# Patient Record
Sex: MALE | Race: WHITE | NOT HISPANIC OR LATINO | Employment: FULL TIME | URBAN - METROPOLITAN AREA
[De-identification: names, ages, dates, MRNs, and addresses within clinical notes are randomized per-mention and may not be internally consistent; named-entity substitution may affect disease eponyms.]

---

## 2021-05-11 ENCOUNTER — NON-PROVIDER VISIT (OUTPATIENT)
Dept: WOUND CARE | Facility: MEDICAL CENTER | Age: 56
End: 2021-05-11
Attending: STUDENT IN AN ORGANIZED HEALTH CARE EDUCATION/TRAINING PROGRAM
Payer: COMMERCIAL

## 2021-05-11 DIAGNOSIS — S31.109D OPEN WOUND OF ABDOMEN, SUBSEQUENT ENCOUNTER: ICD-10-CM

## 2021-05-11 PROCEDURE — 99211 OFF/OP EST MAY X REQ PHY/QHP: CPT

## 2021-05-11 PROCEDURE — 97602 WOUND(S) CARE NON-SELECTIVE: CPT

## 2021-05-11 RX ORDER — NAPROXEN SODIUM 220 MG
440 TABLET ORAL EVERY MORNING
Status: ON HOLD | COMMUNITY
End: 2021-06-30

## 2021-05-11 RX ORDER — BENZONATATE 100 MG/1
CAPSULE ORAL
COMMUNITY
Start: 2021-03-23 | End: 2021-06-27

## 2021-05-11 RX ORDER — DOCUSATE SODIUM 100 MG/1
100 CAPSULE, LIQUID FILLED ORAL DAILY
COMMUNITY
Start: 2021-04-21

## 2021-05-11 RX ORDER — MULTIVITAMIN WITH FOLIC ACID 400 MCG
1 TABLET ORAL
COMMUNITY
End: 2021-06-27

## 2021-05-11 RX ORDER — LISINOPRIL AND HYDROCHLOROTHIAZIDE 20; 12.5 MG/1; MG/1
1 TABLET ORAL EVERY MORNING
COMMUNITY
Start: 2021-04-21

## 2021-05-11 RX ORDER — COLCHICINE 0.6 MG/1
TABLET ORAL
COMMUNITY
Start: 2021-05-04 | End: 2021-06-27

## 2021-05-11 RX ORDER — PHENTERMINE HYDROCHLORIDE 37.5 MG/1
37.5 CAPSULE ORAL
COMMUNITY
End: 2021-06-27

## 2021-05-11 RX ORDER — PHENTERMINE HYDROCHLORIDE 37.5 MG/1
TABLET ORAL
COMMUNITY
Start: 2021-02-11 | End: 2021-06-27

## 2021-05-11 NOTE — PATIENT INSTRUCTIONS
-Keep your wound dressing clean, dry, and intact.    -Change your dressing if it becomes soiled, soaked, or falls off.    -Should you experience any significant changes in your wound(s), such as infection (redness, swelling, localized heat, increased pain, fever > 101 F, chills) or have any questions regarding your home care instructions, please contact the wound center at [phone]. If after hours, contact your primary care physician or go to the hospital emergency room.

## 2021-05-11 NOTE — CERTIFICATION
Non Provider Encounter- Full Thickness wound    HISTORY OF PRESENT ILLNESS  Wound History:    START OF CARE IN CLINIC: 05/11/2021    REFERRING PROVIDER: Claudia MARTINEZ   WOUND- Full Thickness Wound   LOCATION: Midline abdomen   HISTORY: Patient is a 55 year old male with primary history of HTN, obesity, and an abdominal wound that has been present for approximately a year. He has a very large ventral hernia that he states has been repaired five times, but has not resolved. Referring physician is also referring him to a surgeon for consult of the hernia again as he states it has gotten larger recently. He wears two abdominal binders daily and has been changing his dressing daily also. He has been caring for the wound himself due to no insurance. He just recently obtained insurance and is now ready to seek assistance with healing this wound.    **He has had reactions to the adhesive dressings in the past - pulling off his skin and causing rashes - we are trying the silicone adhesive foam today. If he tolerates well, please order them from [facility]. Patient agreeable.      Pertinent Labs and Diagnostics:    Labs:     A1c:   Lab Results   Component Value Date/Time    HBA1C 5.8 05/29/2012 12:15 AM     IMAGING: None in EPIC    VASCULAR STUDIES: None in [facility]    LAST  WOUND CULTURE:  DATE : None in [facility]           FALL RISK ASSESSMENT: Patient is a low fall risk.    65 years or older     Fall within the last 2 years   Uses ambulatory devices  Loss of protective sensation in feet   Use of prostethic/orthotic    Presence of lower extremity/foot/toe amputation   Taking medication that increases risk (per facility policy)    Patient allergies and medications reviewed via Epic.     Wound Assessment:  Wound 05/11/21 Abdomen Midline (Active)   Wound Image   05/11/21 0730   Site Assessment Red;Pink;Fragile 05/11/21 0730   Periwound Assessment Other (Comment) 05/11/21 0730   Margins Attached edges 05/11/21 0730   Drainage Amount  Moderate 05/11/21 0730   Drainage Description Serosanguineous 05/11/21 0730   Treatments Cleansed;Site care 05/11/21 0730   Wound Cleansing Puracyn [place] 05/11/21 0730   Periwound Protectant Skin Protectant Wipes to Periwound;Barrier Paste 05/11/21 0730   Dressing Cleansing/Solutions Not Applicable 05/11/21 0730   Dressing Options Hydrofiber Silver;Silicone Adhesive Foam 05/11/21 0730   Dressing Changed New 05/11/21 0730   Dressing Status Clean;Dry;Intact 05/11/21 0730   Dressing Change/Treatment Frequency Every 72 hrs, and As Needed 05/11/21 0730   Non-staged Wound Description Full thickness 05/11/21 0730   Wound Length (cm) 3.8 cm 05/11/21 0730   Wound Width (cm) 5.8 cm 05/11/21 0730   Wound Depth (cm) 0.1 cm 05/11/21 0730   Wound Surface Area (cm^2) 22.04 cm^2 05/11/21 0730   Wound Volume (cm^3) 2.2 cm^3 05/11/21 0730   Post-Procedure Length (cm) 3.8 cm 05/11/21 0730   Post-Procedure Width (cm) 5.8 cm 05/11/21 0730   Post-Procedure Depth (cm) 0.1 cm 05/11/21 0730   Post-Procedure Surface Area (cm^2) 22.04 cm^2 05/11/21 0730   Post-Procedure Volume (cm^3) 2.2 cm^3 05/11/21 0730   Tunneling (cm) 0 cm 05/11/21 0730   Undermining (cm) 0 cm 05/11/21 0730   Wound Odor None 05/11/21 0730   Pulses N/A 05/11/21 0730   Exposed Structures None 05/11/21 0730     Procedures:    -Non selective blunt debridement with puracyn spray and gauze to remove non viable tissue from wound bed. Patient tolerated well.   -Refer to flowsheet for wound care details.     Post-debridement Photo      PATIENT EDUCATION  -Advised to go to ER for any increased redness, swelling, drainage or odor, or if patient develops fever, chills, nausea or vomiting.  -Importance of adequate nutrition for wound healing  -Increase protein intake (unless contraindicated by renal status)

## 2021-05-18 ENCOUNTER — OFFICE VISIT (OUTPATIENT)
Dept: WOUND CARE | Facility: MEDICAL CENTER | Age: 56
End: 2021-05-18
Attending: STUDENT IN AN ORGANIZED HEALTH CARE EDUCATION/TRAINING PROGRAM
Payer: COMMERCIAL

## 2021-05-18 ENCOUNTER — HOSPITAL ENCOUNTER (OUTPATIENT)
Facility: MEDICAL CENTER | Age: 56
End: 2021-05-18
Attending: NURSE PRACTITIONER
Payer: COMMERCIAL

## 2021-05-18 VITALS
RESPIRATION RATE: 18 BRPM | TEMPERATURE: 97.1 F | HEART RATE: 79 BPM | OXYGEN SATURATION: 97 % | SYSTOLIC BLOOD PRESSURE: 151 MMHG | DIASTOLIC BLOOD PRESSURE: 91 MMHG

## 2021-05-18 DIAGNOSIS — T14.8XXA WOUND INFECTION: ICD-10-CM

## 2021-05-18 DIAGNOSIS — Z87.19 HISTORY OF HERNIA REPAIR: ICD-10-CM

## 2021-05-18 DIAGNOSIS — S31.109D OPEN WOUND OF ABDOMEN, SUBSEQUENT ENCOUNTER: Primary | ICD-10-CM

## 2021-05-18 DIAGNOSIS — L08.9 WOUND INFECTION: ICD-10-CM

## 2021-05-18 DIAGNOSIS — E66.01 CLASS 2 SEVERE OBESITY DUE TO EXCESS CALORIES WITH SERIOUS COMORBIDITY IN ADULT, UNSPECIFIED BMI (HCC): ICD-10-CM

## 2021-05-18 DIAGNOSIS — Z98.890 HISTORY OF HERNIA REPAIR: ICD-10-CM

## 2021-05-18 LAB
AMBIGUOUS DTTM AMBI4: NORMAL
GRAM STN SPEC: NORMAL
SIGNIFICANT IND 70042: NORMAL
SIGNIFICANT IND 70042: NORMAL
SITE SITE: NORMAL
SITE SITE: NORMAL
SOURCE SOURCE: NORMAL
SOURCE SOURCE: NORMAL

## 2021-05-18 PROCEDURE — 11042 DBRDMT SUBQ TIS 1ST 20SQCM/<: CPT

## 2021-05-18 PROCEDURE — 11042 DBRDMT SUBQ TIS 1ST 20SQCM/<: CPT | Performed by: NURSE PRACTITIONER

## 2021-05-18 PROCEDURE — 99213 OFFICE O/P EST LOW 20 MIN: CPT | Mod: 25 | Performed by: NURSE PRACTITIONER

## 2021-05-18 PROCEDURE — 11045 DBRDMT SUBQ TISS EACH ADDL: CPT | Performed by: NURSE PRACTITIONER

## 2021-05-18 PROCEDURE — 87186 SC STD MICRODIL/AGAR DIL: CPT

## 2021-05-18 PROCEDURE — 11045 DBRDMT SUBQ TISS EACH ADDL: CPT

## 2021-05-18 PROCEDURE — 99213 OFFICE O/P EST LOW 20 MIN: CPT

## 2021-05-18 PROCEDURE — 87205 SMEAR GRAM STAIN: CPT

## 2021-05-18 PROCEDURE — 87070 CULTURE OTHR SPECIMN AEROBIC: CPT

## 2021-05-18 RX ORDER — OMEPRAZOLE 20 MG/1
20 CAPSULE, DELAYED RELEASE ORAL DAILY
Status: ON HOLD | COMMUNITY
Start: 2021-05-12 | End: 2022-01-18

## 2021-05-18 ASSESSMENT — PAIN SCALES - GENERAL: PAINLEVEL: 3=SLIGHT PAIN

## 2021-05-18 ASSESSMENT — ENCOUNTER SYMPTOMS
SHORTNESS OF BREATH: 0
VOMITING: 0
WEIGHT LOSS: 1
CHILLS: 0
DIZZINESS: 0
DIARRHEA: 0
BACK PAIN: 0
PALPITATIONS: 0
COUGH: 0
FEVER: 0
CONSTIPATION: 0
NAUSEA: 0
NERVOUS/ANXIOUS: 1
DEPRESSION: 0

## 2021-05-18 NOTE — PROGRESS NOTES
Provider Encounter- Full Thickness wound    HISTORY OF PRESENT ILLNESS  Wound History:    START OF CARE IN CLINIC: 5/18/2021    REFERRING PROVIDER: Claudia Sepulveda P.A.-C.     WOUND- Full Thickness Wound   LOCATION: Mid abdomen   HISTORY: Patient with history of multiple hernia repairs with postoperative complications.  Most recent surgery was around 2015 or 2016, after which he healed.  Unfortunately, he developed a small wound over the surgical site in the spring of 2020, which has progressively enlarged.  He was referred to another surgeon, and was told another surgery was not an option. He was caring for the wound himself due to lack of insurance. He is applying antibiotic ointment and nonstick pads. He also uses 2 abdominal binders. Once he obtained insurance coverage again, he requested assistance with this wound. He was referred to [facility] by his primary care provider.    Of note-He has had reactions to the adhesive dressings in the past - pulling off his skin and causing rashes.    Pertinent Medical History: Obesity, multiple hernia repairs, polycythemia    TOBACCO USE: He has never smoked or use smokeless tobacco    Patient's problem list, allergies, and current medications reviewed and updated in Epic    Interval History:  5/18/2021 Initial clinic visit with HEBER Merrill, FNPIERCE-BC, HANNAN, CFCECILIO. Billy states he is feeling well overall, denies fevers, chills, nausea, vomiting, cough or shortness of breath. He expresses some concern about not doing dressing changes 1-2 times per day as he was previously. Advance wound products and appropriate use as discussed, as well as the rationale to less frequent dressing changes. He informed me that his wounds seem to be doing better when he was on antibiotics for something else. Culture collected in clinic today.      REVIEW OF SYSTEMS:   Review of Systems   Constitutional: Positive for weight loss. Negative for chills and fever.        More than 30 pound weight loss  over the past 6 months, intentional   Respiratory: Negative for cough and shortness of breath.    Cardiovascular: Negative for chest pain, palpitations and leg swelling.   Gastrointestinal: Negative for constipation, diarrhea, nausea and vomiting.   Genitourinary: Negative for dysuria.   Musculoskeletal: Negative for back pain and joint pain.   Skin: Negative for rash.   Neurological: Negative for dizziness.   Psychiatric/Behavioral: Negative for depression. The patient is nervous/anxious.         States he is a bit anxious about his wound and recurrent hernias       PHYSICAL EXAMINATION:   /91   Pulse 79   Temp 36.2 °C (97.1 °F)   Resp 18   SpO2 97%     Physical Exam  Constitutional:       Appearance: Normal appearance.   HENT:      Head: Normocephalic.      Comments: Hard of hearing  Eyes:      Pupils: Pupils are equal, round, and reactive to light.   Cardiovascular:      Rate and Rhythm: Normal rate.   Pulmonary:      Effort: Pulmonary effort is normal.   Abdominal:      Palpations: Abdomen is soft.      Tenderness: There is no guarding or rebound.      Comments: Prominent ventral hernia   Musculoskeletal:         General: Normal range of motion.   Skin:     General: Skin is warm.      Comments: Full-thickness wound to lower abdomen, over ventral hernia   Neurological:      Mental Status: He is alert and oriented to person, place, and time.   Psychiatric:         Mood and Affect: Mood normal.         Behavior: Behavior normal.         Thought Content: Thought content normal.         Judgment: Judgment normal.         WOUND ASSESSMENT      Wound 05/11/21 Abdomen Midline (Active)   Wound Image    05/18/21 0815   Site Assessment Red;Pink;Fragile 05/18/21 0815   Periwound Assessment Other (Comment) 05/18/21 0815   Margins Attached edges 05/18/21 0815   Drainage Amount Large 05/18/21 0815   Drainage Description Serosanguineous 05/18/21 0815   Treatments Cleansed;Site care;Provider debridement;Topical  Lidocaine 05/18/21 0815   Wound Cleansing Normal Saline Irrigation 05/18/21 0815   Periwound Protectant Skin Protectant Wipes to Periwound;Barrier Paste 05/18/21 0815   Dressing Cleansing/Solutions Not Applicable 05/18/21 0815   Dressing Options Hydrofiber Silver;Hydrofiber;Silicone Adhesive Foam 05/18/21 0815   Dressing Changed Changed 05/18/21 0815   Dressing Status Clean;Dry;Intact 05/18/21 0815   Dressing Change/Treatment Frequency Every 72 hrs, and As Needed 05/18/21 0815   Non-staged Wound Description Full thickness 05/18/21 0815   Wound Length (cm) 3.8 cm 05/18/21 0815   Wound Width (cm) 5.7 cm 05/18/21 0815   Wound Depth (cm) 0.1 cm 05/18/21 0815   Wound Surface Area (cm^2) 21.66 cm^2 05/18/21 0815   Wound Volume (cm^3) 2.17 cm^3 05/18/21 0815   Post-Procedure Length (cm) 3.9 cm 05/18/21 0815   Post-Procedure Width (cm) 5.8 cm 05/18/21 0815   Post-Procedure Depth (cm) 0.1 cm 05/18/21 0815   Post-Procedure Surface Area (cm^2) 22.62 cm^2 05/18/21 0815   Post-Procedure Volume (cm^3) 2.26 cm^3 05/18/21 0815   Wound Healing % 1 05/18/21 0815   Tunneling (cm) 0 cm 05/18/21 0815   Undermining (cm) 0 cm 05/18/21 0815   Wound Odor None 05/18/21 0815   Pulses N/A 05/11/21 0730   Exposed Structures None 05/18/21 0815        PROCEDURE:   -2% viscous lidocaine applied topically to wound bed for approximately 5 minutes prior to debridement  -Curette used to debride wound bed.  Excisional debridement was performed to remove devitalized tissue until healthy, bleeding tissue was visualized.   Entire surface of wound, 22.6 cm2 debrided.  Tissue debrided into the subcutaneous layer.    -Bleeding controlled with manual pressure.    -Wound cleansed with normal saline  -Culture collected by RN  -Wound care completed by wound RN, refer to flowsheet  -Patient tolerated the procedure well, without c/o pain or discomfort.       Pertinent Labs and Diagnostics:    Labs:     A1c:   Lab Results   Component Value Date/Time    HBA1C 5.8  05/29/2012 12:15 AM          IMAGING: None found in epic    VASCULAR STUDIES: N/A    LAST  WOUND CULTURE:  DATE : 5/18/2021-culture collected in clinic      ASSESSMENT AND PLAN:   1. Open wound of abdomen, subsequent encounter  Comments: Spontaneous opening of wound over hernia, started as small wound which progressively enlarged.    5/18/2021: Initial provider visit.  Large wound which appears to be a shallow full-thickness wound.  Complicated by location over hernia.  Patient wears 2 abdominal binders, I question whether or not this wound is caused by rubbing from the binder.  -Excisional debridement of wound in clinic today, medically necessary to promote wound healing.  -Patient to return to clinic weekly for assessment and debridement  -Patient to change dressing 1-2 times per week in between clinic visits  -Mepilex foam dressing used to protect wound from binders  -If wound does not progress, consider asking patient to stop wearing binders, and/or to consider alternative garment    Wound care: Silver Hydrofiber to manage exudate and bioburden, sacral Mepilex cover dressing    2. Wound infection    5/18/2021: Patient states that his wound appeared to get better when he was on antibiotics for unrelated reason.  -Culture collected in clinic today.  Follow results    3. History of hernia repair  Comments: Per patient, 5 surgical hernia repairs, all of which have failed.  He is apparently not currently a candidate for another surgery.    4. Class 2 severe obesity due to excess calories with serious comorbidity in adult, unspecified BMI (HCC)    5/18/2021: Patient states he has lost over 30 pounds in the last 6 months, intentional.  He plans to continue his efforts to lose more weight.              PATIENT EDUCATION  - Importance of adequate nutrition for wound healing  -Advised to go to ER for any increased redness, swelling, drainage, or odor, or if patient develops fever, chills, nausea or vomiting.     20 min  spent face to face with patient, >50% of time spent counseling, coordinating care, reviewing records, discussing POC, educating patient regarding wound healing and progression.  This time was spent in excess to procedure time.       Please note that this note may have been created using voice recognition software. I have worked with technical experts from [facility] to optimize the interface.  I have made every reasonable attempt to correct obvious errors, but there may be errors of grammar and possibly content that I did not discover before finalizing the note.    N

## 2021-05-18 NOTE — PATIENT INSTRUCTIONS
-Keep your wound dressing clean, dry, and intact.    -Change your dressing if it becomes soiled, soaked, or falls off.    -Should you experience any significant changes in your wound(s), such as infection (redness, swelling, localized heat, increased pain, fever > 101 F, chills) or have any questions regarding your home care instructions, please contact the wound center at [phone]. If after hours, contact your primary care physician or go to the hospital emergency room.

## 2021-05-20 LAB
BACTERIA WND AEROBE CULT: ABNORMAL
BACTERIA WND AEROBE CULT: ABNORMAL
GRAM STN SPEC: ABNORMAL
SIGNIFICANT IND 70042: ABNORMAL
SITE SITE: ABNORMAL
SOURCE SOURCE: ABNORMAL

## 2021-05-26 ENCOUNTER — OFFICE VISIT (OUTPATIENT)
Dept: WOUND CARE | Facility: MEDICAL CENTER | Age: 56
End: 2021-05-26
Attending: STUDENT IN AN ORGANIZED HEALTH CARE EDUCATION/TRAINING PROGRAM
Payer: COMMERCIAL

## 2021-05-26 VITALS
RESPIRATION RATE: 18 BRPM | DIASTOLIC BLOOD PRESSURE: 96 MMHG | TEMPERATURE: 98 F | OXYGEN SATURATION: 96 % | HEART RATE: 79 BPM | SYSTOLIC BLOOD PRESSURE: 143 MMHG

## 2021-05-26 DIAGNOSIS — Z98.890 HISTORY OF HERNIA REPAIR: ICD-10-CM

## 2021-05-26 DIAGNOSIS — T14.8XXA WOUND INFECTION: ICD-10-CM

## 2021-05-26 DIAGNOSIS — L08.9 WOUND INFECTION: ICD-10-CM

## 2021-05-26 DIAGNOSIS — S31.109D OPEN WOUND OF ABDOMEN, SUBSEQUENT ENCOUNTER: Primary | ICD-10-CM

## 2021-05-26 DIAGNOSIS — Z87.19 HISTORY OF HERNIA REPAIR: ICD-10-CM

## 2021-05-26 PROCEDURE — 99213 OFFICE O/P EST LOW 20 MIN: CPT

## 2021-05-26 PROCEDURE — 17250 CHEM CAUT OF GRANLTJ TISSUE: CPT

## 2021-05-26 PROCEDURE — 17250 CHEM CAUT OF GRANLTJ TISSUE: CPT | Performed by: NURSE PRACTITIONER

## 2021-05-26 PROCEDURE — 99213 OFFICE O/P EST LOW 20 MIN: CPT | Performed by: NURSE PRACTITIONER

## 2021-05-26 ASSESSMENT — ENCOUNTER SYMPTOMS
CHILLS: 0
WEIGHT LOSS: 1
PALPITATIONS: 0
BACK PAIN: 0
COUGH: 0
SHORTNESS OF BREATH: 0
DIARRHEA: 0
FEVER: 0
NERVOUS/ANXIOUS: 1
NAUSEA: 0
CONSTIPATION: 0
DEPRESSION: 0
VOMITING: 0
DIZZINESS: 0

## 2021-05-26 ASSESSMENT — PAIN SCALES - GENERAL: PAINLEVEL: NO PAIN

## 2021-05-26 NOTE — PROGRESS NOTES
Wound care dressing supplies ordered through [employer] 05/26/2021.  Order for scanned into patient chart via [employer].

## 2021-05-26 NOTE — PATIENT INSTRUCTIONS
-Keep dressings clean and dry. Change dressings if they become over saturated, soiled or fall off.     -Should you experience any significant changes in your wound(s), such as signs of infection (redness, swelling, localized heat, increased pain, fever > 101 F, chills) or have any questions regarding your home care instructions, please contact the wound center at [phone]. If after hours, contact your primary care physician or go to the hospital emergency room.

## 2021-05-26 NOTE — PROGRESS NOTES
Provider Encounter- Full Thickness wound    HISTORY OF PRESENT ILLNESS  Wound History:    START OF CARE IN CLINIC: 5/18/2021    REFERRING PROVIDER: Claudia Sepulveda P.A.-C.     WOUND- Full Thickness Wound   LOCATION: Mid abdomen   HISTORY: Patient with history of multiple hernia repairs with postoperative complications.  Most recent surgery was around 2015 or 2016, after which he healed.  Unfortunately, he developed a small wound over the surgical site in the spring of 2020, which has progressively enlarged.  He was referred to another surgeon, and was told another surgery was not an option. He was caring for the wound himself due to lack of insurance. He is applying antibiotic ointment and nonstick pads. He also uses 2 abdominal binders. Once he obtained insurance coverage again, he requested assistance with this wound. He was referred to [facility] by his primary care provider.    Of note-He has had reactions to the adhesive dressings in the past - pulling off his skin and causing rashes.    Pertinent Medical History: Obesity, multiple hernia repairs, polycythemia    TOBACCO USE: He has never smoked or use smokeless tobacco    Patient's problem list, allergies, and current medications reviewed and updated in Epic    Interval History:  5/18/2021 Initial clinic visit with HEBER Merrill, FNP-BC, CWRONYN, CFCN. Billy states he is feeling well overall, denies fevers, chills, nausea, vomiting, cough or shortness of breath. He expresses some concern about not doing dressing changes 1-2 times per day as he was previously. Advance wound products and appropriate use as discussed, as well as the rationale to less frequent dressing changes. He informed me that his wounds seem to be doing better when he was on antibiotics for something else. Culture collected in clinic today.    5/26/2021: Clinic visit with HEBER Montes. Patient states that they are feeling well today.  Patient denies fever, chills, nausea, vomiting,  lightheadedness, dizziness, shortness of breath and chest pain.  Silver nitrate used to the superior aspect of the wound to take down hypergranulation tissue.  Dressing changed to Hydrofera Blue classic to reduce hyper granulation and to treat right staff.  Mupirocin ointment placed to periwound area to treat slight folliculitis.        REVIEW OF SYSTEMS:   Review of Systems   Constitutional: Positive for weight loss. Negative for chills and fever.        More than 30 pound weight loss over the past 6 months, intentional   Respiratory: Negative for cough and shortness of breath.    Cardiovascular: Negative for chest pain, palpitations and leg swelling.   Gastrointestinal: Negative for constipation, diarrhea, nausea and vomiting.   Genitourinary: Negative for dysuria.   Musculoskeletal: Negative for back pain and joint pain.   Skin: Negative for rash.   Neurological: Negative for dizziness.   Psychiatric/Behavioral: Negative for depression. The patient is nervous/anxious.         States he is a bit anxious about his wound and recurrent hernias       PHYSICAL EXAMINATION:   /96   Pulse 79   Temp 36.7 °C (98 °F)   Resp 18   SpO2 96%     Physical Exam  Constitutional:       Appearance: Normal appearance.   HENT:      Head: Normocephalic.      Comments: Hard of hearing  Eyes:      Pupils: Pupils are equal, round, and reactive to light.   Cardiovascular:      Rate and Rhythm: Normal rate.   Pulmonary:      Effort: Pulmonary effort is normal.   Abdominal:      Palpations: Abdomen is soft.      Tenderness: There is no guarding or rebound.      Comments: Prominent ventral hernia   Musculoskeletal:         General: Normal range of motion.   Skin:     General: Skin is warm.      Comments: Full-thickness wound to lower abdomen, over ventral hernia   Neurological:      Mental Status: He is alert and oriented to person, place, and time.   Psychiatric:         Mood and Affect: Mood normal.         Behavior: Behavior  normal.         Thought Content: Thought content normal.         Judgment: Judgment normal.         WOUND ASSESSMENT           Wound 05/11/21 Abdomen Midline (Active)   Wound Image   05/26/21 0934   Site Assessment Pink;Red;Fragile 05/26/21 0934   Periwound Assessment Fragile 05/26/21 0934   Margins Attached edges 05/26/21 0934   Drainage Amount Large 05/26/21 0934   Drainage Description Serosanguineous 05/26/21 0934   Treatments Cleansed;Silver nitrate 05/26/21 0934   Wound Cleansing Puracyn [place] 05/26/21 0934   Periwound Protectant Skin Protectant Wipes to Periwound;Barrier Paste 05/26/21 0934   Dressing Cleansing/Solutions Normal Saline 05/26/21 0934   Dressing Options Hydrofera Blue Classic;Silicone Adhesive Foam 05/26/21 0934   Dressing Changed Changed 05/18/21 0815   Dressing Status Clean;Dry;Intact 05/18/21 0815   Dressing Change/Treatment Frequency Every 72 hrs, and As Needed 05/18/21 0815   Non-staged Wound Description Full thickness 05/26/21 0934   Wound Length (cm) 4.1 cm 05/26/21 0934   Wound Width (cm) 5.5 cm 05/26/21 0934   Wound Depth (cm) 0.1 cm 05/26/21 0934   Wound Surface Area (cm^2) 22.55 cm^2 05/26/21 0934   Wound Volume (cm^3) 2.26 cm^3 05/26/21 0934   Post-Procedure Length (cm) 3.9 cm 05/18/21 0815   Post-Procedure Width (cm) 5.8 cm 05/18/21 0815   Post-Procedure Depth (cm) 0.1 cm 05/18/21 0815   Post-Procedure Surface Area (cm^2) 22.62 cm^2 05/18/21 0815   Post-Procedure Volume (cm^3) 2.26 cm^3 05/18/21 0815   Wound Healing % -3 05/26/21 0934   Tunneling (cm) 0 cm 05/26/21 0934   Undermining (cm) 0 cm 05/26/21 0934   Wound Odor None 05/26/21 0934   Pulses N/A 05/11/21 0730   Exposed Structures None 05/26/21 0934             PROCEDURE:   Patient assessed in clinic today.  Patient's hypergranulation tissue taken down with silver nitrate to approximately three fourths of the wound bed.  We will apply Hydrofera Blue classic to help decrease hyper granulation tissue.  Furthermore, Hydrofera Blue  has 2 antimicrobial properties to help reduce bacterial load patient has light staph from previous culture.  No signs or symptoms of cellulitis or systemic infection.  Topical treatment at this time should be sufficient.      Pertinent Labs and Diagnostics:    Labs:     A1c:   Lab Results   Component Value Date/Time    HBA1C 5.8 05/29/2012 12:15 AM          IMAGING: None found in epic    VASCULAR STUDIES: N/A    LAST  WOUND CULTURE:  DATE : 5/18/2021-culture collected in clinic      ASSESSMENT AND PLAN:   1. Open wound of abdomen, subsequent encounter  Comments: Spontaneous opening of wound over hernia, started as small wound which progressively enlarged.    5/26/2021: Large wound which appears to be a shallow full-thickness wound.  Complicated by location over hernia.  Patient wears 2 abdominal binders.  More than likely the binders are contributing to the initial wound.  -Patient's wound treated with silver nitrate to reduce hyper granulation tissue  -Patient to return to clinic weekly for assessment and debridement  -Patient to change dressing 1-2 times per week in between clinic visits  -Mepilex foam dressing used to protect wound from binders  -If wound does not progress, consider asking patient to stop wearing binders, and/or to consider alternative garment    Wound care: Zinc barrier paste to periwound area Hydrofera Blue classic, silicone adhesive foam for securement    2. Wound infection    5/26/2021: Patient states that his wound appeared to get better when he was on antibiotics for unrelated reason.  -Culture results return positive for light staph treated with Hydrofera Blue topically, no signs of cellulitis or additional signs warranting systemic treatment.    3. History of hernia repair  Comments: Per patient, 5 surgical hernia repairs, all of which have failed.  He is apparently not currently a candidate for another surgery.    4. Class 2 severe obesity due to excess calories with serious comorbidity  in adult, unspecified BMI (HCC)    5/26/2021: Patient states he has lost over 30 pounds in the last 6 months, intentional.  He plans to continue his efforts to lose more weight.    PATIENT EDUCATION  - Importance of adequate nutrition for wound healing  -Advised to go to ER for any increased redness, swelling, drainage, or odor, or if patient develops fever, chills, nausea or vomiting.       > 20  min spent face to face with patient, >50% of time spent counseling, coordinating care, reviewing records, discussing POC, educating patient.  Time spent in excess of procedural time        Please note that this note may have been created using voice recognition software. I have worked with technical experts from [employer] to optimize the interface.  I have made every reasonable attempt to correct obvious errors, but there may be errors of grammar and possibly content that I did not discover before finalizing the note.    N

## 2021-05-27 ENCOUNTER — TELEPHONE (OUTPATIENT)
Dept: WOUND CARE | Facility: MEDICAL CENTER | Age: 56
End: 2021-05-27

## 2021-06-01 ENCOUNTER — OFFICE VISIT (OUTPATIENT)
Dept: WOUND CARE | Facility: MEDICAL CENTER | Age: 56
End: 2021-06-01
Attending: STUDENT IN AN ORGANIZED HEALTH CARE EDUCATION/TRAINING PROGRAM
Payer: COMMERCIAL

## 2021-06-01 VITALS
TEMPERATURE: 96.9 F | HEART RATE: 75 BPM | OXYGEN SATURATION: 98 % | DIASTOLIC BLOOD PRESSURE: 89 MMHG | RESPIRATION RATE: 20 BRPM | SYSTOLIC BLOOD PRESSURE: 143 MMHG

## 2021-06-01 DIAGNOSIS — Z87.19 HISTORY OF HERNIA REPAIR: ICD-10-CM

## 2021-06-01 DIAGNOSIS — T14.8XXA WOUND INFECTION: ICD-10-CM

## 2021-06-01 DIAGNOSIS — L08.9 WOUND INFECTION: ICD-10-CM

## 2021-06-01 DIAGNOSIS — S31.109D OPEN WOUND OF ABDOMEN, SUBSEQUENT ENCOUNTER: ICD-10-CM

## 2021-06-01 DIAGNOSIS — E66.01 CLASS 2 SEVERE OBESITY DUE TO EXCESS CALORIES WITH SERIOUS COMORBIDITY IN ADULT, UNSPECIFIED BMI (HCC): ICD-10-CM

## 2021-06-01 DIAGNOSIS — Z98.890 HISTORY OF HERNIA REPAIR: ICD-10-CM

## 2021-06-01 PROCEDURE — 11042 DBRDMT SUBQ TIS 1ST 20SQCM/<: CPT

## 2021-06-01 PROCEDURE — 11042 DBRDMT SUBQ TIS 1ST 20SQCM/<: CPT | Performed by: NURSE PRACTITIONER

## 2021-06-01 ASSESSMENT — ENCOUNTER SYMPTOMS
CONSTIPATION: 0
DIARRHEA: 0
WEIGHT LOSS: 1
NAUSEA: 0
CHILLS: 0
SHORTNESS OF BREATH: 0
PALPITATIONS: 0
DIZZINESS: 0
DEPRESSION: 0
NERVOUS/ANXIOUS: 1
COUGH: 0
VOMITING: 0
FEVER: 0
BACK PAIN: 0

## 2021-06-01 ASSESSMENT — PAIN SCALES - GENERAL: PAINLEVEL: 3=SLIGHT PAIN

## 2021-06-01 NOTE — PROGRESS NOTES
Provider Encounter- Full Thickness wound    HISTORY OF PRESENT ILLNESS  Wound History:    START OF CARE IN CLINIC: 5/18/2021    REFERRING PROVIDER: Claudia Sepulveda P.A.-C.     WOUND- Full Thickness Wound   LOCATION: Mid abdomen   HISTORY: Patient with history of multiple hernia repairs with postoperative complications.  Most recent surgery was around 2015 or 2016, after which he healed.  Unfortunately, he developed a small wound over the surgical site in the spring of 2020, which has progressively enlarged.  He was referred to another surgeon, and was told another surgery was not an option. He was caring for the wound himself due to lack of insurance. He is applying antibiotic ointment and nonstick pads. He also uses 2 abdominal binders. Once he obtained insurance coverage again, he requested assistance with this wound. He was referred to [facility] by his primary care provider.    Of note-He has had reactions to the adhesive dressings in the past - pulling off his skin and causing rashes.    Pertinent Medical History: Obesity, multiple hernia repairs, polycythemia    TOBACCO USE: He has never smoked or use smokeless tobacco    Patient's problem list, allergies, and current medications reviewed and updated in Epic    Interval History:  5/18/2021 Initial clinic visit with HEBER Merrill, FNP-BC, CWRONYN, CFCN. Billy states he is feeling well overall, denies fevers, chills, nausea, vomiting, cough or shortness of breath. He expresses some concern about not doing dressing changes 1-2 times per day as he was previously. Advance wound products and appropriate use as discussed, as well as the rationale to less frequent dressing changes. He informed me that his wounds seem to be doing better when he was on antibiotics for something else. Culture collected in clinic today.    5/26/2021: Clinic visit with HEBER Montes. Patient states that they are feeling well today.  Patient denies fever, chills, nausea, vomiting,  lightheadedness, dizziness, shortness of breath and chest pain.  Silver nitrate used to the superior aspect of the wound to take down hypergranulation tissue.  Dressing changed to Hydrofera Blue classic to reduce hyper granulation and to treat right staff.  Mupirocin ointment placed to periwound area to treat slight folliculitis.      6/1/2021 : Clinic visit with Mary Alcazar, APRN, FNP-BC, CWOCN, CFCN.  Billy states he is feeling well.  He is satisfied with progression of his wound.  We discussed alternative hernia belt options, I referred him to [employer] website.      REVIEW OF SYSTEMS:   Review of Systems   Constitutional: Positive for weight loss. Negative for chills and fever.        More than 30 pound weight loss over the past 6 months, intentional   Respiratory: Negative for cough and shortness of breath.    Cardiovascular: Negative for chest pain, palpitations and leg swelling.   Gastrointestinal: Negative for constipation, diarrhea, nausea and vomiting.   Genitourinary: Negative for dysuria.   Musculoskeletal: Negative for back pain and joint pain.   Skin: Negative for rash.   Neurological: Negative for dizziness.   Psychiatric/Behavioral: Negative for depression. The patient is nervous/anxious.         States he is a bit anxious about his wound and recurrent hernias       PHYSICAL EXAMINATION:   /89   Pulse 75   Temp 36.1 °C (96.9 °F)   Resp 20   SpO2 98%     Physical Exam  Constitutional:       Appearance: Normal appearance.   HENT:      Head: Normocephalic.      Comments: Hard of hearing  Eyes:      Pupils: Pupils are equal, round, and reactive to light.   Cardiovascular:      Rate and Rhythm: Normal rate.   Pulmonary:      Effort: Pulmonary effort is normal.   Abdominal:      Palpations: Abdomen is soft.      Tenderness: There is no guarding or rebound.      Comments: Prominent ventral hernia   Musculoskeletal:         General: Normal range of motion.   Skin:     General: Skin is warm.       Comments: Full-thickness wound to lower abdomen, over ventral hernia   Neurological:      Mental Status: He is alert and oriented to person, place, and time.   Psychiatric:         Mood and Affect: Mood normal.         Behavior: Behavior normal.         Thought Content: Thought content normal.         Judgment: Judgment normal.         WOUND ASSESSMENT    Wound 05/11/21 Abdomen Midline (Active)   Wound Image    06/01/21 0800   Site Assessment Pink;Red;Fragile 06/01/21 0800   Periwound Assessment Fragile 06/01/21 0800   Margins Attached edges 06/01/21 0800   Drainage Amount Moderate 06/01/21 0800   Drainage Description Serosanguineous 06/01/21 0800   Treatments Cleansed;Topical Lidocaine;Provider debridement;Site care 06/01/21 0800   Wound Cleansing Normal Saline Irrigation 06/01/21 0800   Periwound Protectant Skin Protectant Wipes to Periwound;Barrier Paste 06/01/21 0800   Dressing Cleansing/Solutions Normal Saline 06/01/21 0800   Dressing Options Hydrofera Blue Classic;Silicone Adhesive Foam 06/01/21 0800   Dressing Changed Changed 06/01/21 0800   Dressing Status Clean;Dry;Intact 05/18/21 0815   Dressing Change/Treatment Frequency Every 72 hrs, and As Needed 06/01/21 0800   Non-staged Wound Description Full thickness 06/01/21 0800   Wound Length (cm) 3.3 cm 06/01/21 0800   Wound Width (cm) 4.5 cm 06/01/21 0800   Wound Depth (cm) 0.1 cm 06/01/21 0800   Wound Surface Area (cm^2) 14.85 cm^2 06/01/21 0800   Wound Volume (cm^3) 1.48 cm^3 06/01/21 0800   Post-Procedure Length (cm) 3.4 cm 06/01/21 0800   Post-Procedure Width (cm) 4.6 cm 06/01/21 0800   Post-Procedure Depth (cm) 0.1 cm 06/01/21 0800   Post-Procedure Surface Area (cm^2) 15.64 cm^2 06/01/21 0800   Post-Procedure Volume (cm^3) 1.56 cm^3 06/01/21 0800   Wound Healing % 33 06/01/21 0800   Tunneling (cm) 0 cm 06/01/21 0800   Undermining (cm) 0 cm 06/01/21 0800   Wound Odor None 06/01/21 0800   Pulses N/A 05/11/21 0730   Exposed Structures None 06/01/21 0800      PROCEDURE:   -2% viscous lidocaine applied topically to wound bed for approximately 5 minutes prior to debridement  -Curette used to debride wound bed.  Excisional debridement was performed to remove devitalized tissue until healthy, bleeding tissue was visualized.   Entire surface of wound, 15.64 cm2 debrided.  Tissue debrided into the subcutaneous layer.    -Bleeding controlled with manual pressure.    -Wound care completed by wound RN, refer to flowsheet  -Patient tolerated the procedure well, without c/o pain or discomfort.     Pertinent Labs and Diagnostics:    Labs:     A1c:   Lab Results   Component Value Date/Time    HBA1C 5.8 05/29/2012 12:15 AM          IMAGING: None found in epic    VASCULAR STUDIES: N/A    LAST  WOUND CULTURE:  DATE : 5/18/2021-culture collected in clinic      ASSESSMENT AND PLAN:   1. Open wound of abdomen, subsequent encounter  Comments: Spontaneous opening of wound over hernia, started as small wound which progressively enlarged.    6/1/2021: Wound area has decreased in size assessment.  Patient referred to site for alternative hernia belt options.  -Patient's wound treated with silver nitrate to reduce hyper granulation tissue  -Patient to return to clinic weekly for assessment and debridement  -Patient to change dressing 1-2 times per week in between clinic visits  -Mepilex foam dressing used to protect wound from binders  -If wound does not progress, consider asking patient to stop wearing binders, and/or to consider alternative garment    Wound care: Zinc barrier paste to periwound area Hydrofera Blue classic, silicone adhesive foam for securement    2. Wound infection    6/1/2021: No signs or symptoms of infection today  -H clinic visit    3. History of hernia repair  Comments: Per patient, 5 surgical hernia repairs, all of which have failed.  He is apparently not currently a candidate for another surgery.    4. Class 2 severe obesity due to excess calories with serious  comorbidity in adult, unspecified BMI (HCC)    6/1/2021: Patient states he has lost over 30 pounds in the last 6 months, intentional.  He plans to continue his efforts to lose more weight.    PATIENT EDUCATION  - Importance of adequate nutrition for wound healing  -Advised to go to ER for any increased redness, swelling, drainage, or odor, or if patient develops fever, chills, nausea or vomiting.             Please note that this note may have been created using voice recognition software. I have worked with technical experts from [employer] to optimize the interface.  I have made every reasonable attempt to correct obvious errors, but there may be errors of grammar and possibly content that I did not discover before finalizing the note.    N

## 2021-06-01 NOTE — PATIENT INSTRUCTIONS
-Keep your wound dressing clean, dry, and intact.    -Change your dressing if it becomes soiled, soaked, or falls off.    -Should you experience any significant changes in your wound(s), such as infection (redness, swelling, localized heat, increased pain, fever > 101 F, chills) or have any questions regarding your home care instructions, please contact the wound center at [phone]. If after hours, contact your primary care physician or go to the hospital emergency room.

## 2021-06-08 ENCOUNTER — NON-PROVIDER VISIT (OUTPATIENT)
Dept: WOUND CARE | Facility: MEDICAL CENTER | Age: 56
End: 2021-06-08
Attending: STUDENT IN AN ORGANIZED HEALTH CARE EDUCATION/TRAINING PROGRAM
Payer: COMMERCIAL

## 2021-06-08 PROCEDURE — 97602 WOUND(S) CARE NON-SELECTIVE: CPT

## 2021-06-08 NOTE — PATIENT INSTRUCTIONS
-Keep your wound dressing clean, dry, and intact.    -Change your dressing if it becomes soiled, soaked, or falls off.    -Should you experience any significant changes in your wound(s), such as infection (redness, swelling, localized heat, increased pain, fever > 101 F, chills) or have any questions regarding your home care instructions, please contact the wound center at [phone]. If after hours, contact your primary care physician or go to the hospital emergency room.

## 2021-06-08 NOTE — PROCEDURES
Non-selective debridement with gauze and saline to debride wound bed and periwound of non-viable tissue. Patient tolerated the procedure well.

## 2021-06-15 ENCOUNTER — OFFICE VISIT (OUTPATIENT)
Dept: WOUND CARE | Facility: MEDICAL CENTER | Age: 56
End: 2021-06-15
Attending: STUDENT IN AN ORGANIZED HEALTH CARE EDUCATION/TRAINING PROGRAM
Payer: COMMERCIAL

## 2021-06-15 VITALS
HEART RATE: 69 BPM | DIASTOLIC BLOOD PRESSURE: 103 MMHG | RESPIRATION RATE: 12 BRPM | TEMPERATURE: 97 F | OXYGEN SATURATION: 97 % | SYSTOLIC BLOOD PRESSURE: 144 MMHG

## 2021-06-15 DIAGNOSIS — T14.8XXA WOUND INFECTION: ICD-10-CM

## 2021-06-15 DIAGNOSIS — Z98.890 HISTORY OF HERNIA REPAIR: ICD-10-CM

## 2021-06-15 DIAGNOSIS — L08.9 WOUND INFECTION: ICD-10-CM

## 2021-06-15 DIAGNOSIS — Z87.19 HISTORY OF HERNIA REPAIR: ICD-10-CM

## 2021-06-15 DIAGNOSIS — S31.109D OPEN WOUND OF ABDOMEN, SUBSEQUENT ENCOUNTER: Primary | ICD-10-CM

## 2021-06-15 DIAGNOSIS — E66.01 CLASS 2 SEVERE OBESITY DUE TO EXCESS CALORIES WITH SERIOUS COMORBIDITY IN ADULT, UNSPECIFIED BMI (HCC): ICD-10-CM

## 2021-06-15 PROCEDURE — 11042 DBRDMT SUBQ TIS 1ST 20SQCM/<: CPT | Performed by: NURSE PRACTITIONER

## 2021-06-15 PROCEDURE — 11042 DBRDMT SUBQ TIS 1ST 20SQCM/<: CPT

## 2021-06-15 ASSESSMENT — ENCOUNTER SYMPTOMS
VOMITING: 0
CONSTIPATION: 0
BACK PAIN: 0
DIZZINESS: 0
CHILLS: 0
DIARRHEA: 0
COUGH: 0
PALPITATIONS: 0
FEVER: 0
NAUSEA: 0
DEPRESSION: 0
WEIGHT LOSS: 1
SHORTNESS OF BREATH: 0

## 2021-06-15 ASSESSMENT — PAIN SCALES - GENERAL: PAINLEVEL: NO PAIN

## 2021-06-15 NOTE — PROGRESS NOTES
Provider Encounter- Full Thickness wound    HISTORY OF PRESENT ILLNESS  Wound History:    START OF CARE IN CLINIC: 5/18/2021    REFERRING PROVIDER: Claudia Sepulveda P.A.-C.     WOUND- Full Thickness Wound   LOCATION: Mid abdomen   HISTORY: Patient with history of multiple hernia repairs with postoperative complications.  Most recent surgery was around 2015 or 2016, after which he healed.  Unfortunately, he developed a small wound over the surgical site in the spring of 2020, which has progressively enlarged.  He was referred to another surgeon, and was told another surgery was not an option. He was caring for the wound himself due to lack of insurance. He is applying antibiotic ointment and nonstick pads. He also uses 2 abdominal binders. Once he obtained insurance coverage again, he requested assistance with this wound. He was referred to [facility] by his primary care provider.    Of note-He has had reactions to the adhesive dressings in the past - pulling off his skin and causing rashes.    Pertinent Medical History: Obesity, multiple hernia repairs, polycythemia    TOBACCO USE: He has never smoked or use smokeless tobacco    Patient's problem list, allergies, and current medications reviewed and updated in Epic    Interval History:  5/18/2021 Initial clinic visit with HEBER Merrill, FNP-BC, CWRONYN, CFCN. Billy states he is feeling well overall, denies fevers, chills, nausea, vomiting, cough or shortness of breath. He expresses some concern about not doing dressing changes 1-2 times per day as he was previously. Advance wound products and appropriate use as discussed, as well as the rationale to less frequent dressing changes. He informed me that his wounds seem to be doing better when he was on antibiotics for something else. Culture collected in clinic today.    5/26/2021: Clinic visit with HEBER Montes. Patient states that they are feeling well today.  Patient denies fever, chills, nausea, vomiting,  lightheadedness, dizziness, shortness of breath and chest pain.  Silver nitrate used to the superior aspect of the wound to take down hypergranulation tissue.  Dressing changed to Hydrofera Blue classic to reduce hyper granulation and to treat right staff.  Mupirocin ointment placed to periwound area to treat slight folliculitis.      6/1/2021 : Clinic visit with HEBER Merrill, FNP-BC, CWOCN, CFCN.  Billy states he is feeling well.  He is satisfied with progression of his wound.  We discussed alternative hernia belt options, I referred him to [employer] website.    6/15/2021: Clinic visit with HEBER Montes. Patient states that they are feeling well today.  Patient denies fever, chills, nausea, vomiting, lightheadedness, dizziness, shortness of breath and chest pain. Epibole edges to the inferior aspect of the wound taken down in clinic today.  Surface of the wound debrided to stimulate granulation tissue formation continue progression the wound bed.    REVIEW OF SYSTEMS:   Review of Systems   Constitutional: Positive for weight loss. Negative for chills and fever.        More than 30 pound weight loss over the past 6 months, intentional   Respiratory: Negative for cough and shortness of breath.    Cardiovascular: Negative for chest pain, palpitations and leg swelling.   Gastrointestinal: Negative for constipation, diarrhea, nausea and vomiting.   Genitourinary: Negative for dysuria.   Musculoskeletal: Negative for back pain and joint pain.   Skin: Negative for rash.   Neurological: Negative for dizziness.   Psychiatric/Behavioral: Negative for depression.       PHYSICAL EXAMINATION:   /103 (BP Location: Right arm, Patient Position: Supine, BP Cuff Size: Adult)   Pulse 69   Temp 36.1 °C (97 °F) (Temporal)   Resp 12   SpO2 97%     Physical Exam  Constitutional:       Appearance: Normal appearance.   HENT:      Head: Normocephalic.      Comments: Hard of hearing  Eyes:      Pupils: Pupils are  equal, round, and reactive to light.   Cardiovascular:      Rate and Rhythm: Normal rate.   Pulmonary:      Effort: Pulmonary effort is normal.   Abdominal:      Palpations: Abdomen is soft.      Tenderness: There is no guarding or rebound.      Comments: Prominent ventral hernia   Musculoskeletal:         General: Normal range of motion.   Skin:     General: Skin is warm.      Comments: Full-thickness wound to lower abdomen, over ventral hernia   Neurological:      Mental Status: He is alert and oriented to person, place, and time.   Psychiatric:         Mood and Affect: Mood normal.         Behavior: Behavior normal.         Thought Content: Thought content normal.         Judgment: Judgment normal.         WOUND ASSESSMENT         Wound 05/11/21 Abdomen Midline (Active)   Wound Image   06/15/21 0835   Site Assessment Red;Fragile;Early/partial granulation 06/15/21 0817   Periwound Assessment Scar tissue 06/15/21 0817   Margins Attached edges;Epibole (rolled edges);Defined edges 06/15/21 0817   Drainage Amount Small 06/15/21 0817   Drainage Description Serosanguineous 06/15/21 0817   Treatments Cleansed;Topical Lidocaine;Provider debridement 06/15/21 0817   Wound Cleansing [facility] [place] [facility] 06/15/21 0817   Periwound Protectant Skin Protectant Wipes to Periwound;Barrier Paste 06/15/21 0817   Dressing Cleansing/Solutions Normal Saline 06/15/21 0817   Dressing Options Hydrofera Blue Classic;Silicone Adhesive Foam 06/15/21 0817   Dressing Changed Changed 06/15/21 0817   Dressing Status Clean;Dry;Intact 05/18/21 0815   Dressing Change/Treatment Frequency Every 72 hrs, and As Needed 06/15/21 0817   Non-staged Wound Description Full thickness 06/15/21 0817   Wound Length (cm) 2.8 cm 06/15/21 0817   Wound Width (cm) 3.4 cm 06/15/21 0817   Wound Depth (cm) 0.1 cm 06/15/21 0817   Wound Surface Area (cm^2) 9.52 cm^2 06/15/21 0817   Wound Volume (cm^3) 0.95 cm^3 06/15/21 0817   Post-Procedure Length (cm) 2.8 cm 06/15/21 0817    Post-Procedure Width (cm) 3.2 cm 06/15/21 0817   Post-Procedure Depth (cm) 0.1 cm 06/15/21 0817   Post-Procedure Surface Area (cm^2) 8.96 cm^2 06/15/21 0817   Post-Procedure Volume (cm^3) 0.9 cm^3 06/15/21 0817   Wound Healing % 57 06/15/21 0817   Wound Bed Granulation (%) 100 % 06/15/21 0817   Tunneling (cm) 0 cm 06/15/21 0817   Undermining (cm) 0 cm 06/15/21 0817   Wound Odor None 06/15/21 0817   Pulses N/A 05/11/21 0730   Exposed Structures None 06/15/21 0817            PROCEDURE:   -2% viscous lidocaine applied topically to wound bed for approximately 5 minutes prior to debridement  -Curette used to debride wound bed.  Excisional debridement was performed to remove devitalized tissue until healthy, bleeding tissue was visualized.   Entire surface of wound, 8.96 cm2 debrided.  Tissue debrided into the subcutaneous layer.   -Bleeding controlled with manual pressure.    -Wound care completed by wound RN, refer to flowsheet  -Patient tolerated the procedure well, without c/o pain or discomfort.     Pertinent Labs and Diagnostics:    Labs:     A1c:   Lab Results   Component Value Date/Time    HBA1C 5.8 05/29/2012 12:15 AM          IMAGING: None found in epic    VASCULAR STUDIES: N/A    LAST  WOUND CULTURE:  DATE : 5/18/2021-culture collected in clinic      ASSESSMENT AND PLAN:   1. Open wound of abdomen, subsequent encounter  Comments: Spontaneous opening of wound over hernia, started as small wound which progressively enlarged.    6/15/2021: Wound area has decreased in size assessment. Epibole edges taken down in clinic.  Patient referred to site for alternative hernia belt options.  -Inferior rolled as taken down with curette in clinic today.  -Patient to return to clinic weekly for assessment and debridement  -Patient to change dressing 1-2 times per week in between clinic visits  -Mepilex foam dressing used to protect wound from binders  -If wound does not progress, consider asking patient to stop wearing  binders, and/or to consider alternative garment    Wound care: Hydrofera Blue classic, silicone adhesive foam    2. Wound infection    6/15/2021: No signs or symptoms of infection today  -Continue to monitor for signs symptoms of infection at each additional clinic visit    3. History of hernia repair  Comments: Per patient, 5 surgical hernia repairs, all of which have failed.  He is apparently not currently a candidate for another surgery.    4. Class 2 severe obesity due to excess calories with serious comorbidity in adult, unspecified BMI (HCC)    6/15/2021: Patient states he has lost over 30 pounds in the last 6 months, intentional.  He plans to continue his efforts to lose more weight.    PATIENT EDUCATION  - Importance of adequate nutrition for wound healing  -Advised to go to ER for any increased redness, swelling, drainage, or odor, or if patient develops fever, chills, nausea or vomiting.             Please note that this note may have been created using voice recognition software. I have worked with technical experts from [employer] to optimize the interface.  I have made every reasonable attempt to correct obvious errors, but there may be errors of grammar and possibly content that I did not discover before finalizing the note.    N

## 2021-06-15 NOTE — PATIENT INSTRUCTIONS
Should you experience any significant changes in your wound(s) such as infection (redness, swelling, localized heat, increased pain, fever >101 F, chills) or have any questions regarding your home care instructions, please contact the wound center [phone]. If after hours, contact your primary care physician or go the hospital emergency room.  Change dressing if over saturated, soiled or its falling off or every 72 hours as demonstrated during your appointment.

## 2021-06-22 ENCOUNTER — OFFICE VISIT (OUTPATIENT)
Dept: WOUND CARE | Facility: MEDICAL CENTER | Age: 56
End: 2021-06-22
Attending: STUDENT IN AN ORGANIZED HEALTH CARE EDUCATION/TRAINING PROGRAM
Payer: COMMERCIAL

## 2021-06-22 VITALS
SYSTOLIC BLOOD PRESSURE: 148 MMHG | DIASTOLIC BLOOD PRESSURE: 96 MMHG | HEART RATE: 96 BPM | RESPIRATION RATE: 18 BRPM | OXYGEN SATURATION: 97 % | TEMPERATURE: 97.6 F

## 2021-06-22 DIAGNOSIS — L08.9 WOUND INFECTION: ICD-10-CM

## 2021-06-22 DIAGNOSIS — T14.8XXA WOUND INFECTION: ICD-10-CM

## 2021-06-22 DIAGNOSIS — Z98.890 HISTORY OF HERNIA REPAIR: ICD-10-CM

## 2021-06-22 DIAGNOSIS — Z87.19 HISTORY OF HERNIA REPAIR: ICD-10-CM

## 2021-06-22 DIAGNOSIS — E66.01 CLASS 2 SEVERE OBESITY DUE TO EXCESS CALORIES WITH SERIOUS COMORBIDITY IN ADULT, UNSPECIFIED BMI (HCC): ICD-10-CM

## 2021-06-22 DIAGNOSIS — S31.109D OPEN WOUND OF ABDOMEN, SUBSEQUENT ENCOUNTER: ICD-10-CM

## 2021-06-22 PROCEDURE — 11042 DBRDMT SUBQ TIS 1ST 20SQCM/<: CPT | Performed by: NURSE PRACTITIONER

## 2021-06-22 PROCEDURE — 99213 OFFICE O/P EST LOW 20 MIN: CPT

## 2021-06-22 PROCEDURE — 11042 DBRDMT SUBQ TIS 1ST 20SQCM/<: CPT

## 2021-06-22 PROCEDURE — 99213 OFFICE O/P EST LOW 20 MIN: CPT | Mod: 25 | Performed by: NURSE PRACTITIONER

## 2021-06-22 ASSESSMENT — ENCOUNTER SYMPTOMS
BACK PAIN: 0
CHILLS: 0
FEVER: 0
NAUSEA: 0
DEPRESSION: 0
DIZZINESS: 0
PALPITATIONS: 0
CONSTIPATION: 0
SHORTNESS OF BREATH: 0
DIARRHEA: 0
VOMITING: 0
COUGH: 0
WEIGHT LOSS: 1

## 2021-06-22 ASSESSMENT — PAIN SCALES - GENERAL: PAINLEVEL: 2=MINIMAL-SLIGHT

## 2021-06-22 NOTE — PROGRESS NOTES
Provider Encounter- Full Thickness wound    HISTORY OF PRESENT ILLNESS  Wound History:    START OF CARE IN CLINIC: 5/18/2021    REFERRING PROVIDER: Claudia Sepulveda P.A.-C.     WOUND- Full Thickness Wound   LOCATION: Mid abdomen   HISTORY: Patient with history of multiple hernia repairs with postoperative complications.  Most recent surgery was around 2015 or 2016, after which he healed.  Unfortunately, he developed a small wound over the surgical site in the spring of 2020, which has progressively enlarged.  He was referred to another surgeon, and was told another surgery was not an option. He was caring for the wound himself due to lack of insurance. He is applying antibiotic ointment and nonstick pads. He also uses 2 abdominal binders. Once he obtained insurance coverage again, he requested assistance with this wound. He was referred to [facility] by his primary care provider.    Of note-He has had reactions to the adhesive dressings in the past - pulling off his skin and causing rashes.    Pertinent Medical History: Obesity, multiple hernia repairs, polycythemia    TOBACCO USE: He has never smoked or use smokeless tobacco    Patient's problem list, allergies, and current medications reviewed and updated in Epic    Interval History:  5/18/2021 Initial clinic visit with HEBER Merrill, FNP-BC, CWRONYN, CFCN. Billy states he is feeling well overall, denies fevers, chills, nausea, vomiting, cough or shortness of breath. He expresses some concern about not doing dressing changes 1-2 times per day as he was previously. Advance wound products and appropriate use as discussed, as well as the rationale to less frequent dressing changes. He informed me that his wounds seem to be doing better when he was on antibiotics for something else. Culture collected in clinic today.    5/26/2021: Clinic visit with HEBER Montes. Patient states that they are feeling well today.  Patient denies fever, chills, nausea, vomiting,  lightheadedness, dizziness, shortness of breath and chest pain.  Silver nitrate used to the superior aspect of the wound to take down hypergranulation tissue.  Dressing changed to Hydrofera Blue classic to reduce hyper granulation and to treat right staff.  Mupirocin ointment placed to periwound area to treat slight folliculitis.      6/1/2021 : Clinic visit with HEBER Merrill, DILCIA-BC, CWOCN, CFCN.  Billy states he is feeling well.  He is satisfied with progression of his wound.  We discussed alternative hernia belt options, [facility].    6/15/2021: Clinic visit with HEBER Montes. Patient states that they are feeling well today.  Patient denies fever, chills, nausea, vomiting, lightheadedness, dizziness, shortness of breath and chest pain. Epibole edges to the inferior aspect of the wound taken down in clinic today.  Surface of the wound debrided to stimulate granulation tissue formation continue progression the wound bed.    6/22/21: Clinic visit with HEBER Leonard, DILCIA-BC. Patient reports feeling well.  Overall he is happy with the wound progress.  He does report to me today that the wound started due to some rubbing on his skin from his hernia belt.  He states initially it was a small area but then he applied a Band-Aid and when he remove the Band-Aid there was subsequent skin tear.  Denies wound drainage, odor. Denies erythema, swelling, pain.  He has been seen by multiple surgeons in the past and has been told hernia surgery is not an option.  He is discussing today that his primary care provider has discussed sending him to a specialist at [facility].  He does wear a hernia belt consistently    REVIEW OF SYSTEMS:   Review of Systems   Constitutional: Positive for weight loss. Negative for chills and fever.        More than 30 pound weight loss over the past 6 months, intentional   Respiratory: Negative for cough and shortness of breath.    Cardiovascular: Negative for  chest pain, palpitations and leg swelling.   Gastrointestinal: Negative for constipation, diarrhea, nausea and vomiting.   Genitourinary: Negative for dysuria.   Musculoskeletal: Negative for back pain and joint pain.   Skin: Negative for rash.   Neurological: Negative for dizziness.   Psychiatric/Behavioral: Negative for depression.       PHYSICAL EXAMINATION:   /96   Pulse 96   Temp 36.4 °C (97.6 °F)   Resp 18   SpO2 97%     Physical Exam  Constitutional:       Appearance: Normal appearance.   HENT:      Head: Normocephalic.      Comments: Hard of hearing  Eyes:      Pupils: Pupils are equal, round, and reactive to light.   Cardiovascular:      Rate and Rhythm: Normal rate.   Pulmonary:      Effort: Pulmonary effort is normal.   Abdominal:      Palpations: Abdomen is soft.      Tenderness: There is no guarding or rebound.      Comments: Prominent ventral hernia   Musculoskeletal:         General: Normal range of motion.   Skin:     General: Skin is warm.      Comments: Full-thickness wound to lower abdomen, over ventral hernia   Neurological:      Mental Status: He is alert and oriented to person, place, and time.   Psychiatric:         Mood and Affect: Mood normal.         Behavior: Behavior normal.         Thought Content: Thought content normal.         Judgment: Judgment normal.         WOUND ASSESSMENT      Wound 05/11/21 Abdomen Midline (Active)   Wound Image    06/22/21 1400   Site Assessment Red;Early/partial granulation 06/22/21 1400   Periwound Assessment Scar tissue 06/22/21 1400   Margins Attached edges;Epibole (rolled edges) 06/22/21 1400   Drainage Amount Small 06/22/21 1400   Drainage Description Serosanguineous 06/22/21 1400   Treatments Cleansed;Topical Lidocaine;Provider debridement;Site care 06/22/21 1400   Wound Cleansing Puracyn Hollenberg 06/22/21 1400   Periwound Protectant Skin Protectant Wipes to Periwound;Barrier Paste 06/22/21 1400   Dressing Cleansing/Solutions Normal Saline  06/15/21 0817   Dressing Options Hydrofera Blue Classic;Silicone Adhesive Foam 06/22/21 1400   Dressing Changed Changed 06/15/21 0817   Dressing Status Clean;Dry;Intact 05/18/21 0815   Dressing Change/Treatment Frequency Every 72 hrs, and As Needed 06/15/21 0817   Non-staged Wound Description Full thickness 06/22/21 1400   Wound Length (cm) 1.7 cm 06/22/21 1400   Wound Width (cm) 3 cm 06/22/21 1400   Wound Depth (cm) 0.1 cm 06/22/21 1400   Wound Surface Area (cm^2) 5.1 cm^2 06/22/21 1400   Wound Volume (cm^3) 0.51 cm^3 06/22/21 1400   Post-Procedure Length (cm) 1.9 cm 06/22/21 1400   Post-Procedure Width (cm) 3.1 cm 06/22/21 1400   Post-Procedure Depth (cm) 0.1 cm 06/22/21 1400   Post-Procedure Surface Area (cm^2) 5.89 cm^2 06/22/21 1400   Post-Procedure Volume (cm^3) 0.59 cm^3 06/22/21 1400   Wound Healing % 77 06/22/21 1400   Wound Bed Granulation (%) 100 % 06/22/21 1400   Tunneling (cm) 0 cm 06/22/21 1400   Undermining (cm) 0 cm 06/22/21 1400   Wound Odor None 06/22/21 1400   Pulses N/A 05/11/21 0730   Exposed Structures None 06/22/21 1400        PROCEDURE:   -2% viscous lidocaine applied topically to wound bed for approximately 5 minutes prior to debridement  -Curette used to debride wound bed.  Excisional debridement was performed to remove devitalized tissue until healthy, bleeding tissue was visualized.   Entire surface of wound, 5.89 cm2 debrided.  Tissue debrided into the subcutaneous layer.   -Bleeding controlled with manual pressure.    -Wound care completed by wound RN, refer to flowsheet  -Patient tolerated the procedure well, without c/o pain or discomfort.     Pertinent Labs and Diagnostics:    Labs:     A1c:   Lab Results   Component Value Date/Time    HBA1C 5.8 05/29/2012 12:15 AM          IMAGING: None found in epic    VASCULAR STUDIES: N/A    LAST  WOUND CULTURE:  DATE : 5/18/2021-culture collected in clinic      ASSESSMENT AND PLAN:   1. Open wound of abdomen, subsequent encounter  Comments:  Spontaneous opening of wound over hernia, started as small wound which progressively enlarged.    6/22/2021: Wound area has decreased in size assessment.     -Patient to return to clinic weekly for assessment and debridement  -Patient to change dressing 1-2 times per week in between clinic visits  -Mepilex foam dressing used to protect wound from binders    Wound care: Hydrofera Blue classic, silicone adhesive foam    2. Wound infection    6/22/2021: No signs or symptoms of infection today  -Continue to monitor for signs symptoms of infection at each additional clinic visit    3. History of hernia repair  Comments: Per patient, 5 surgical hernia repairs, all of which have failed.  He is apparently not currently a candidate for another surgery, however does mention that his primary care provider has discussed referral to [facility] to a surgical specialist    4. Class 2 severe obesity due to excess calories with serious comorbidity in adult, unspecified BMI (HCC)    6/22/2021: Patient states he has an intentional weight loss of 30 pounds in the last 6 months.  He plans to continue his efforts to lose more weight.  He is aware of how his weight contributes to his abdominal hernia    PATIENT EDUCATION  - Importance of adequate nutrition for wound healing  -Advised to go to ER for any increased redness, swelling, drainage, or odor, or if patient develops fever, chills, nausea or vomiting.       My total time spent caring for the patient on the day of the encounter was 25 minutes.   This does not include time spent on separately billable procedures/tests.        Please note that this note may have been created using voice recognition software. I have worked with technical experts from [employer] to optimize the interface.  I have made every reasonable attempt to correct obvious errors, but there may be errors of grammar and possibly content that I did not discover before finalizing the note.    N

## 2021-06-22 NOTE — PATIENT INSTRUCTIONS
Apply Hydrofera Blue Classic moistened with normal saline to wound bed as directed by provider. Cover wound with silicone bordered foam and change dressing every 2 days.    Should you experience any significant changes in your wound(s), such as infection (redness, swelling, localized heat, increased pain, fever > 101 F, chills) or have any questions regarding your home care instructions, please contact the wound center at [phone]. If after hours, contact your primary care physician or go to the hospital emergency room.   Keep dressing clean, dry and covered while bathing. Only change dressing if it becomes over saturated, soiled or falls off.

## 2021-06-27 ENCOUNTER — APPOINTMENT (OUTPATIENT)
Dept: RADIOLOGY | Facility: MEDICAL CENTER | Age: 56
DRG: 872 | End: 2021-06-27
Attending: EMERGENCY MEDICINE
Payer: COMMERCIAL

## 2021-06-27 ENCOUNTER — HOSPITAL ENCOUNTER (INPATIENT)
Facility: MEDICAL CENTER | Age: 56
LOS: 3 days | DRG: 872 | End: 2021-06-30
Attending: EMERGENCY MEDICINE | Admitting: STUDENT IN AN ORGANIZED HEALTH CARE EDUCATION/TRAINING PROGRAM
Payer: COMMERCIAL

## 2021-06-27 ENCOUNTER — APPOINTMENT (OUTPATIENT)
Dept: RADIOLOGY | Facility: MEDICAL CENTER | Age: 56
DRG: 872 | End: 2021-06-27
Payer: COMMERCIAL

## 2021-06-27 PROBLEM — D72.829 LEUKOCYTOSIS: Status: ACTIVE | Noted: 2021-06-27

## 2021-06-27 PROBLEM — K56.609 SMALL BOWEL OBSTRUCTION (HCC): Status: ACTIVE | Noted: 2021-06-27

## 2021-06-27 PROBLEM — E87.20 LACTIC ACIDOSIS: Status: ACTIVE | Noted: 2021-06-27

## 2021-06-27 PROBLEM — E66.9 OBESITY: Status: ACTIVE | Noted: 2021-06-27

## 2021-06-27 LAB
ALBUMIN SERPL BCP-MCNC: 4.9 G/DL (ref 3.2–4.9)
ALBUMIN/GLOB SERPL: 1.4 G/DL
ALP SERPL-CCNC: 95 U/L (ref 30–99)
ALT SERPL-CCNC: 30 U/L (ref 2–50)
ANION GAP SERPL CALC-SCNC: 20 MMOL/L (ref 7–16)
APTT PPP: 23.4 SEC (ref 24.7–36)
AST SERPL-CCNC: 26 U/L (ref 12–45)
BASOPHILS # BLD AUTO: 0.3 % (ref 0–1.8)
BASOPHILS # BLD: 0.04 K/UL (ref 0–0.12)
BILIRUB SERPL-MCNC: 2.1 MG/DL (ref 0.1–1.5)
BUN SERPL-MCNC: 21 MG/DL (ref 8–22)
CALCIUM SERPL-MCNC: 10.5 MG/DL (ref 8.5–10.5)
CHLORIDE SERPL-SCNC: 104 MMOL/L (ref 96–112)
CO2 SERPL-SCNC: 20 MMOL/L (ref 20–33)
CREAT SERPL-MCNC: 1.34 MG/DL (ref 0.5–1.4)
EOSINOPHIL # BLD AUTO: 0.08 K/UL (ref 0–0.51)
EOSINOPHIL NFR BLD: 0.6 % (ref 0–6.9)
ERYTHROCYTE [DISTWIDTH] IN BLOOD BY AUTOMATED COUNT: 42.4 FL (ref 35.9–50)
GLOBULIN SER CALC-MCNC: 3.5 G/DL (ref 1.9–3.5)
GLUCOSE SERPL-MCNC: 176 MG/DL (ref 65–99)
HCT VFR BLD AUTO: 59.9 % (ref 42–52)
HGB BLD-MCNC: 20.3 G/DL (ref 14–18)
IMM GRANULOCYTES # BLD AUTO: 0.09 K/UL (ref 0–0.11)
IMM GRANULOCYTES NFR BLD AUTO: 0.6 % (ref 0–0.9)
INR PPP: 1 (ref 0.87–1.13)
LACTATE BLD-SCNC: 3.7 MMOL/L (ref 0.5–2)
LACTATE BLD-SCNC: 7.6 MMOL/L (ref 0.5–2)
LYMPHOCYTES # BLD AUTO: 2.7 K/UL (ref 1–4.8)
LYMPHOCYTES NFR BLD: 19.4 % (ref 22–41)
MAGNESIUM SERPL-MCNC: 2 MG/DL (ref 1.5–2.5)
MCH RBC QN AUTO: 29.7 PG (ref 27–33)
MCHC RBC AUTO-ENTMCNC: 33.9 G/DL (ref 33.7–35.3)
MCV RBC AUTO: 87.7 FL (ref 81.4–97.8)
MONOCYTES # BLD AUTO: 1.15 K/UL (ref 0–0.85)
MONOCYTES NFR BLD AUTO: 8.3 % (ref 0–13.4)
NEUTROPHILS # BLD AUTO: 9.83 K/UL (ref 1.82–7.42)
NEUTROPHILS NFR BLD: 70.8 % (ref 44–72)
NRBC # BLD AUTO: 0 K/UL
NRBC BLD-RTO: 0 /100 WBC
NT-PROBNP SERPL IA-MCNC: 112 PG/ML (ref 0–125)
PLATELET # BLD AUTO: 214 K/UL (ref 164–446)
PMV BLD AUTO: 12.2 FL (ref 9–12.9)
POTASSIUM SERPL-SCNC: 3.9 MMOL/L (ref 3.6–5.5)
PROT SERPL-MCNC: 8.4 G/DL (ref 6–8.2)
PROTHROMBIN TIME: 12.9 SEC (ref 12–14.6)
RBC # BLD AUTO: 6.83 M/UL (ref 4.7–6.1)
SODIUM SERPL-SCNC: 144 MMOL/L (ref 135–145)
TROPONIN T SERPL-MCNC: 14 NG/L (ref 6–19)
WBC # BLD AUTO: 13.9 K/UL (ref 4.8–10.8)

## 2021-06-27 PROCEDURE — 700105 HCHG RX REV CODE 258: Performed by: STUDENT IN AN ORGANIZED HEALTH CARE EDUCATION/TRAINING PROGRAM

## 2021-06-27 PROCEDURE — 83735 ASSAY OF MAGNESIUM: CPT

## 2021-06-27 PROCEDURE — 700111 HCHG RX REV CODE 636 W/ 250 OVERRIDE (IP): Performed by: STUDENT IN AN ORGANIZED HEALTH CARE EDUCATION/TRAINING PROGRAM

## 2021-06-27 PROCEDURE — 80053 COMPREHEN METABOLIC PANEL: CPT

## 2021-06-27 PROCEDURE — 83605 ASSAY OF LACTIC ACID: CPT

## 2021-06-27 PROCEDURE — 96374 THER/PROPH/DIAG INJ IV PUSH: CPT

## 2021-06-27 PROCEDURE — A9270 NON-COVERED ITEM OR SERVICE: HCPCS | Performed by: STUDENT IN AN ORGANIZED HEALTH CARE EDUCATION/TRAINING PROGRAM

## 2021-06-27 PROCEDURE — 85610 PROTHROMBIN TIME: CPT

## 2021-06-27 PROCEDURE — 99285 EMERGENCY DEPT VISIT HI MDM: CPT

## 2021-06-27 PROCEDURE — 700102 HCHG RX REV CODE 250 W/ 637 OVERRIDE(OP): Performed by: STUDENT IN AN ORGANIZED HEALTH CARE EDUCATION/TRAINING PROGRAM

## 2021-06-27 PROCEDURE — 700117 HCHG RX CONTRAST REV CODE 255: Performed by: EMERGENCY MEDICINE

## 2021-06-27 PROCEDURE — 96375 TX/PRO/DX INJ NEW DRUG ADDON: CPT

## 2021-06-27 PROCEDURE — 85730 THROMBOPLASTIN TIME PARTIAL: CPT

## 2021-06-27 PROCEDURE — 96376 TX/PRO/DX INJ SAME DRUG ADON: CPT

## 2021-06-27 PROCEDURE — 99291 CRITICAL CARE FIRST HOUR: CPT | Performed by: STUDENT IN AN ORGANIZED HEALTH CARE EDUCATION/TRAINING PROGRAM

## 2021-06-27 PROCEDURE — 99221 1ST HOSP IP/OBS SF/LOW 40: CPT | Performed by: STUDENT IN AN ORGANIZED HEALTH CARE EDUCATION/TRAINING PROGRAM

## 2021-06-27 PROCEDURE — 700111 HCHG RX REV CODE 636 W/ 250 OVERRIDE (IP): Performed by: EMERGENCY MEDICINE

## 2021-06-27 PROCEDURE — 36415 COLL VENOUS BLD VENIPUNCTURE: CPT

## 2021-06-27 PROCEDURE — 770006 HCHG ROOM/CARE - MED/SURG/GYN SEMI*

## 2021-06-27 PROCEDURE — 83880 ASSAY OF NATRIURETIC PEPTIDE: CPT

## 2021-06-27 PROCEDURE — 71045 X-RAY EXAM CHEST 1 VIEW: CPT

## 2021-06-27 PROCEDURE — 74175 CTA ABDOMEN W/CONTRAST: CPT

## 2021-06-27 PROCEDURE — 84484 ASSAY OF TROPONIN QUANT: CPT

## 2021-06-27 PROCEDURE — 85025 COMPLETE CBC W/AUTO DIFF WBC: CPT

## 2021-06-27 RX ORDER — PROMETHAZINE HYDROCHLORIDE 25 MG/1
12.5-25 TABLET ORAL EVERY 4 HOURS PRN
Status: DISCONTINUED | OUTPATIENT
Start: 2021-06-27 | End: 2021-06-30 | Stop reason: HOSPADM

## 2021-06-27 RX ORDER — MORPHINE SULFATE 4 MG/ML
1 INJECTION, SOLUTION INTRAMUSCULAR; INTRAVENOUS EVERY 4 HOURS PRN
Status: DISCONTINUED | OUTPATIENT
Start: 2021-06-27 | End: 2021-06-28

## 2021-06-27 RX ORDER — ONDANSETRON 2 MG/ML
4 INJECTION INTRAMUSCULAR; INTRAVENOUS EVERY 4 HOURS PRN
Status: DISCONTINUED | OUTPATIENT
Start: 2021-06-27 | End: 2021-06-30 | Stop reason: HOSPADM

## 2021-06-27 RX ORDER — LISINOPRIL 20 MG/1
20 TABLET ORAL
Status: DISCONTINUED | OUTPATIENT
Start: 2021-06-28 | End: 2021-06-27

## 2021-06-27 RX ORDER — MORPHINE SULFATE 4 MG/ML
4 INJECTION, SOLUTION INTRAMUSCULAR; INTRAVENOUS ONCE
Status: COMPLETED | OUTPATIENT
Start: 2021-06-27 | End: 2021-06-27

## 2021-06-27 RX ORDER — OXYCODONE HYDROCHLORIDE AND ACETAMINOPHEN 5; 325 MG/1; MG/1
1 TABLET ORAL EVERY 6 HOURS PRN
Status: DISCONTINUED | OUTPATIENT
Start: 2021-06-27 | End: 2021-06-30 | Stop reason: HOSPADM

## 2021-06-27 RX ORDER — ONDANSETRON 4 MG/1
4 TABLET, ORALLY DISINTEGRATING ORAL EVERY 4 HOURS PRN
Status: DISCONTINUED | OUTPATIENT
Start: 2021-06-27 | End: 2021-06-30 | Stop reason: HOSPADM

## 2021-06-27 RX ORDER — SODIUM CHLORIDE, SODIUM LACTATE, POTASSIUM CHLORIDE, AND CALCIUM CHLORIDE .6; .31; .03; .02 G/100ML; G/100ML; G/100ML; G/100ML
30 INJECTION, SOLUTION INTRAVENOUS ONCE
Status: COMPLETED | OUTPATIENT
Start: 2021-06-27 | End: 2021-06-28

## 2021-06-27 RX ORDER — HYDROCHLOROTHIAZIDE 12.5 MG/1
12.5 TABLET ORAL
Status: DISCONTINUED | OUTPATIENT
Start: 2021-06-28 | End: 2021-06-27

## 2021-06-27 RX ORDER — ACETAMINOPHEN 325 MG/1
650 TABLET ORAL EVERY 6 HOURS PRN
Status: DISCONTINUED | OUTPATIENT
Start: 2021-06-27 | End: 2021-06-30 | Stop reason: HOSPADM

## 2021-06-27 RX ORDER — ONDANSETRON 2 MG/ML
4 INJECTION INTRAMUSCULAR; INTRAVENOUS ONCE
Status: COMPLETED | OUTPATIENT
Start: 2021-06-27 | End: 2021-06-27

## 2021-06-27 RX ORDER — GUAIFENESIN/DEXTROMETHORPHAN 100-10MG/5
10 SYRUP ORAL EVERY 6 HOURS PRN
Status: DISCONTINUED | OUTPATIENT
Start: 2021-06-27 | End: 2021-06-30 | Stop reason: HOSPADM

## 2021-06-27 RX ORDER — ENALAPRILAT 1.25 MG/ML
1.25 INJECTION INTRAVENOUS EVERY 6 HOURS PRN
Status: DISCONTINUED | OUTPATIENT
Start: 2021-06-27 | End: 2021-06-27

## 2021-06-27 RX ORDER — LABETALOL HYDROCHLORIDE 5 MG/ML
10 INJECTION, SOLUTION INTRAVENOUS EVERY 4 HOURS PRN
Status: DISCONTINUED | OUTPATIENT
Start: 2021-06-27 | End: 2021-06-27

## 2021-06-27 RX ORDER — CLONIDINE HYDROCHLORIDE 0.1 MG/1
0.1 TABLET ORAL EVERY 6 HOURS PRN
Status: DISCONTINUED | OUTPATIENT
Start: 2021-06-27 | End: 2021-06-27

## 2021-06-27 RX ORDER — HEPARIN SODIUM 5000 [USP'U]/ML
5000 INJECTION, SOLUTION INTRAVENOUS; SUBCUTANEOUS EVERY 8 HOURS
Status: DISCONTINUED | OUTPATIENT
Start: 2021-06-27 | End: 2021-06-30 | Stop reason: HOSPADM

## 2021-06-27 RX ORDER — SODIUM CHLORIDE, SODIUM LACTATE, POTASSIUM CHLORIDE, CALCIUM CHLORIDE 600; 310; 30; 20 MG/100ML; MG/100ML; MG/100ML; MG/100ML
INJECTION, SOLUTION INTRAVENOUS CONTINUOUS
Status: DISCONTINUED | OUTPATIENT
Start: 2021-06-27 | End: 2021-06-28

## 2021-06-27 RX ORDER — SODIUM CHLORIDE, SODIUM LACTATE, POTASSIUM CHLORIDE, AND CALCIUM CHLORIDE .6; .31; .03; .02 G/100ML; G/100ML; G/100ML; G/100ML
1000 INJECTION, SOLUTION INTRAVENOUS
Status: COMPLETED | OUTPATIENT
Start: 2021-06-27 | End: 2021-06-29

## 2021-06-27 RX ORDER — LISINOPRIL AND HYDROCHLOROTHIAZIDE 20; 12.5 MG/1; MG/1
1 TABLET ORAL
Status: DISCONTINUED | OUTPATIENT
Start: 2021-06-27 | End: 2021-06-27

## 2021-06-27 RX ORDER — OMEPRAZOLE 20 MG/1
20 CAPSULE, DELAYED RELEASE ORAL DAILY
Status: DISCONTINUED | OUTPATIENT
Start: 2021-06-28 | End: 2021-06-30 | Stop reason: HOSPADM

## 2021-06-27 RX ORDER — PROMETHAZINE HYDROCHLORIDE 25 MG/1
12.5-25 SUPPOSITORY RECTAL EVERY 4 HOURS PRN
Status: DISCONTINUED | OUTPATIENT
Start: 2021-06-27 | End: 2021-06-30 | Stop reason: HOSPADM

## 2021-06-27 RX ORDER — PROCHLORPERAZINE EDISYLATE 5 MG/ML
5-10 INJECTION INTRAMUSCULAR; INTRAVENOUS EVERY 4 HOURS PRN
Status: DISCONTINUED | OUTPATIENT
Start: 2021-06-27 | End: 2021-06-30 | Stop reason: HOSPADM

## 2021-06-27 RX ORDER — PAROXETINE 30 MG/1
60 TABLET, FILM COATED ORAL DAILY
Status: DISCONTINUED | OUTPATIENT
Start: 2021-06-28 | End: 2021-06-27

## 2021-06-27 RX ORDER — TELMISARTAN 80 MG/1
80 TABLET ORAL DAILY
Status: DISCONTINUED | OUTPATIENT
Start: 2021-06-28 | End: 2021-06-27

## 2021-06-27 RX ORDER — SODIUM CHLORIDE 9 MG/ML
INJECTION, SOLUTION INTRAVENOUS CONTINUOUS
Status: DISCONTINUED | OUTPATIENT
Start: 2021-06-27 | End: 2021-06-28

## 2021-06-27 RX ADMIN — ONDANSETRON 4 MG: 2 INJECTION INTRAMUSCULAR; INTRAVENOUS at 19:59

## 2021-06-27 RX ADMIN — IOHEXOL 100 ML: 350 INJECTION, SOLUTION INTRAVENOUS at 14:19

## 2021-06-27 RX ADMIN — ONDANSETRON 4 MG: 2 INJECTION INTRAMUSCULAR; INTRAVENOUS at 16:02

## 2021-06-27 RX ADMIN — SODIUM CHLORIDE: 9 INJECTION, SOLUTION INTRAVENOUS at 19:32

## 2021-06-27 RX ADMIN — MORPHINE SULFATE 4 MG: 4 INJECTION INTRAVENOUS at 16:02

## 2021-06-27 RX ADMIN — SODIUM CHLORIDE, POTASSIUM CHLORIDE, SODIUM LACTATE AND CALCIUM CHLORIDE 3810 ML: 600; 310; 30; 20 INJECTION, SOLUTION INTRAVENOUS at 23:24

## 2021-06-27 RX ADMIN — OXYCODONE HYDROCHLORIDE AND ACETAMINOPHEN 1 TABLET: 5; 325 TABLET ORAL at 19:50

## 2021-06-27 RX ADMIN — FAMOTIDINE 20 MG: 10 INJECTION INTRAVENOUS at 16:36

## 2021-06-27 RX ADMIN — MORPHINE SULFATE 4 MG: 4 INJECTION INTRAVENOUS at 14:15

## 2021-06-27 RX ADMIN — MORPHINE SULFATE 1 MG: 4 INJECTION INTRAVENOUS at 22:04

## 2021-06-27 RX ADMIN — ONDANSETRON 4 MG: 2 INJECTION INTRAMUSCULAR; INTRAVENOUS at 14:15

## 2021-06-27 ASSESSMENT — COGNITIVE AND FUNCTIONAL STATUS - GENERAL
SUGGESTED CMS G CODE MODIFIER MOBILITY: CK
DRESSING REGULAR UPPER BODY CLOTHING: A LITTLE
CLIMB 3 TO 5 STEPS WITH RAILING: A LITTLE
MOVING FROM LYING ON BACK TO SITTING ON SIDE OF FLAT BED: A LITTLE
DAILY ACTIVITIY SCORE: 18
PERSONAL GROOMING: A LITTLE
SUGGESTED CMS G CODE MODIFIER DAILY ACTIVITY: CK
HELP NEEDED FOR BATHING: A LITTLE
STANDING UP FROM CHAIR USING ARMS: A LITTLE
TURNING FROM BACK TO SIDE WHILE IN FLAT BAD: A LITTLE
TOILETING: A LITTLE
MOBILITY SCORE: 19
WALKING IN HOSPITAL ROOM: A LITTLE
EATING MEALS: A LITTLE
DRESSING REGULAR LOWER BODY CLOTHING: A LITTLE

## 2021-06-27 ASSESSMENT — LIFESTYLE VARIABLES
EVER HAD A DRINK FIRST THING IN THE MORNING TO STEADY YOUR NERVES TO GET RID OF A HANGOVER: NO
ALCOHOL_USE: NO
TOTAL SCORE: 0
HAVE PEOPLE ANNOYED YOU BY CRITICIZING YOUR DRINKING: NO
AVERAGE NUMBER OF DAYS PER WEEK YOU HAVE A DRINK CONTAINING ALCOHOL: 0
ON A TYPICAL DAY WHEN YOU DRINK ALCOHOL HOW MANY DRINKS DO YOU HAVE: 0
HAVE YOU EVER FELT YOU SHOULD CUT DOWN ON YOUR DRINKING: NO
TOTAL SCORE: 0
TOTAL SCORE: 0
CONSUMPTION TOTAL: NEGATIVE
DOES PATIENT WANT TO STOP DRINKING: NO
EVER FELT BAD OR GUILTY ABOUT YOUR DRINKING: NO
HOW MANY TIMES IN THE PAST YEAR HAVE YOU HAD 5 OR MORE DRINKS IN A DAY: 0

## 2021-06-27 ASSESSMENT — PATIENT HEALTH QUESTIONNAIRE - PHQ9
2. FEELING DOWN, DEPRESSED, IRRITABLE, OR HOPELESS: NOT AT ALL
1. LITTLE INTEREST OR PLEASURE IN DOING THINGS: NOT AT ALL
SUM OF ALL RESPONSES TO PHQ9 QUESTIONS 1 AND 2: 0

## 2021-06-27 ASSESSMENT — ENCOUNTER SYMPTOMS
HEADACHES: 0
COUGH: 1
WEAKNESS: 1
CHILLS: 0
FEVER: 0
VOMITING: 1
SHORTNESS OF BREATH: 0
EYES NEGATIVE: 1
ABDOMINAL PAIN: 1
NAUSEA: 1
SPUTUM PRODUCTION: 1
BACK PAIN: 1
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
## 2021-06-27 NOTE — ED NOTES
Assumed care of patient who is resting in bed with family at bedside  Asked for water and head of bed raised  States pain to abdominal area is returning.

## 2021-06-27 NOTE — ED PROVIDER NOTES
ED Provider Note    Scribed for Dr. Brayan Rider M.D. by Yuly Fernandez. 6/27/2021  1:59 PM    Primary care provider: Leobardo Flower M.D.  Means of arrival: walk in  History obtained from: patient  History limited by: none noted    CHIEF COMPLAINT  Chief Complaint   Patient presents with    Abdominal Pain     middle, radiating to middle back        HPI  Billy Stephenson is a 55 y.o. male who presents to the Emergency Department with abdominal pain secondary to a hernia. The patient endorses pain in the center of his abdomen at the location of his hernia, which he reports is usually reducible. He also endorses nausea, vomiting, and pain in his lower back. He states the abdominal pain began after he started vomiting. He adds he has not experienced these symptoms since his surgery a few years ago. The patient reports it took 1.5 - 2 years for the hernia to return after the surgery. Denies any fever, cough, or shortness of breath.     REVIEW OF SYSTEMS  Pertinent positives include abdominal pain, abdominal hernia, nausea, vomiting, and lower back pain. Pertinent negatives include no fever, cough, or shortness of breath. As above, all other systems reviewed and are negative.   See HPI for further details.     PAST MEDICAL HISTORY   has a past medical history of Hypertension and Pneumonia.    SURGICAL HISTORY   has a past surgical history that includes other abdominal surgery; exploratory laparotomy (5/29/2012); and ventral hernia repair (5/29/2012).    SOCIAL HISTORY  Social History     Tobacco Use    Smoking status: Never Smoker    Smokeless tobacco: Never Used   Vaping Use    Vaping Use: Never used   Substance Use Topics    Alcohol use: Yes     Comment: Moderate    Drug use: No      Social History     Substance and Sexual Activity   Drug Use No       FAMILY HISTORY  No family history on file.    CURRENT MEDICATIONS  Home Medications       Reviewed by Sheri Garcia (Pharmacy Tech) on 06/27/21 at 8064   "Med List Status: Complete     Medication Last Dose Status   docusate sodium (COLACE) 100 MG Cap 6/26/2021 Active   lisinopril-hydrochlorothiazide (PRINZIDE) 20-12.5 MG per tablet 6/26/2021 Active   naproxen (ANAPROX) 220 MG tablet 6/26/2021 Active   omeprazole (PRILOSEC) 20 MG delayed-release capsule 6/26/2021 Active                    ALLERGIES  Allergies   Allergen Reactions    Hydrocodone-Acetaminophen Anxiety     Headache      Bactrim Ds Hives    Other Drug Itching and Rash     Silk tape    Tetanus Toxoid-Diphtheria Toxoid [Decavac]     Sulfa Drugs Rash and Swelling       PHYSICAL EXAM  VITAL SIGNS: BP (!) 163/124 Comment: R arm, sitting, Triage RN Malika notified  Pulse (!) 118   Temp 36.7 °C (98.1 °F) (Oral)   Resp 20   Ht 1.803 m (5' 11\")   Wt (!) 127 kg (280 lb)   SpO2 93%   BMI 39.05 kg/m²     Constitutional: Well developed, Well nourished, moderate distress, Non-toxic appearance.   HENT: Normocephalic, Atraumatic, Bilateral external ears normal, Oropharynx moist, No oral exudates.   Eyes: PERRLA, EOMI, Conjunctiva normal, No discharge.   Neck: No tenderness, Supple, No stridor.   Lymphatic: No lymphadenopathy noted.   Cardiovascular: Normal heart rate, Normal rhythm.   Thorax & Lungs: Clear to auscultation bilaterally, No respiratory distress, No wheezing, No crackles.   Abdomen: Large, central abdominal hernia, completely reduced tender to touch, Soft, No masses, No pulsatile masses.   Skin: Superficial wound on hernia, Warm, Dry, No erythema, No rash.   Extremities:, No edema No cyanosis.   Musculoskeletal: No tenderness to palpation or major deformities noted.  Intact distal pulses  Neurologic: Awake, alert. Moves all extremities spontaneously.  Psychiatric: Affect normal, Judgment normal, Mood normal.     LABS  Results for orders placed or performed during the hospital encounter of 06/27/21   TROPONIN   Result Value Ref Range    Troponin T 14 6 - 19 ng/L   proBrain Natriuretic Peptide, NT "   Result Value Ref Range    NT-proBNP 112 0 - 125 pg/mL   CBC WITH DIFFERENTIAL   Result Value Ref Range    WBC 13.9 (H) 4.8 - 10.8 K/uL    RBC 6.83 (H) 4.70 - 6.10 M/uL    Hemoglobin 20.3 (H) 14.0 - 18.0 g/dL    Hematocrit 59.9 (H) 42.0 - 52.0 %    MCV 87.7 81.4 - 97.8 fL    MCH 29.7 27.0 - 33.0 pg    MCHC 33.9 33.7 - 35.3 g/dL    RDW 42.4 35.9 - 50.0 fL    Platelet Count 214 164 - 446 K/uL    MPV 12.2 9.0 - 12.9 fL    Neutrophils-Polys 70.80 44.00 - 72.00 %    Lymphocytes 19.40 (L) 22.00 - 41.00 %    Monocytes 8.30 0.00 - 13.40 %    Eosinophils 0.60 0.00 - 6.90 %    Basophils 0.30 0.00 - 1.80 %    Immature Granulocytes 0.60 0.00 - 0.90 %    Nucleated RBC 0.00 /100 WBC    Neutrophils (Absolute) 9.83 (H) 1.82 - 7.42 K/uL    Lymphs (Absolute) 2.70 1.00 - 4.80 K/uL    Monos (Absolute) 1.15 (H) 0.00 - 0.85 K/uL    Eos (Absolute) 0.08 0.00 - 0.51 K/uL    Baso (Absolute) 0.04 0.00 - 0.12 K/uL    Immature Granulocytes (abs) 0.09 0.00 - 0.11 K/uL    NRBC (Absolute) 0.00 K/uL   COMP METABOLIC PANEL   Result Value Ref Range    Sodium 144 135 - 145 mmol/L    Potassium 3.9 3.6 - 5.5 mmol/L    Chloride 104 96 - 112 mmol/L    Co2 20 20 - 33 mmol/L    Anion Gap 20.0 (H) 7.0 - 16.0    Glucose 176 (H) 65 - 99 mg/dL    Bun 21 8 - 22 mg/dL    Creatinine 1.34 0.50 - 1.40 mg/dL    Calcium 10.5 8.5 - 10.5 mg/dL    AST(SGOT) 26 12 - 45 U/L    ALT(SGPT) 30 2 - 50 U/L    Alkaline Phosphatase 95 30 - 99 U/L    Total Bilirubin 2.1 (H) 0.1 - 1.5 mg/dL    Albumin 4.9 3.2 - 4.9 g/dL    Total Protein 8.4 (H) 6.0 - 8.2 g/dL    Globulin 3.5 1.9 - 3.5 g/dL    A-G Ratio 1.4 g/dL   APTT (PTT)   Result Value Ref Range    APTT 23.4 (L) 24.7 - 36.0 sec   PROTHROMBIN TIME (INR)   Result Value Ref Range    PT 12.9 12.0 - 14.6 sec    INR 1.00 0.87 - 1.13   ESTIMATED GFR   Result Value Ref Range    GFR If African American >60 >60 mL/min/1.73 m 2    GFR If Non  56 (A) >60 mL/min/1.73 m 2   LACTIC ACID   Result Value Ref Range    Lactic Acid  3.7 (H) 0.5 - 2.0 mmol/L   Magnesium   Result Value Ref Range    Magnesium 2.0 1.5 - 2.5 mg/dL       All labs reviewed by me.    RADIOLOGY  DX-CHEST-PORTABLE (1 VIEW)   Final Result      Hypoinflation without other evidence for acute cardiopulmonary disease.      CT-CTA COMPLETE THORACOABDOMINAL AORTA   Final Result      1.  There is no evidence of thoracic or abdominal aortic aneurysm or dissection.   2.  There is a large ventral abdominal wall hernia containing dilated loops of small bowel and mesentery with proximal obstructed small bowel loops measuring up to 5 cm in diameter.   3.  There is no pneumatosis or free peritoneal air.   4.  There is a stable 4 mm right lower lobe lung nodule consistent with a benign postinflammatory nodule. Stable since 2012, no further imaging is recommended.              The radiologist's interpretation of all radiological studies have been reviewed by me.    COURSE & MEDICAL DECISION MAKING  Pertinent Labs & Imaging studies reviewed. (See chart for details)    1:59 PM - Patient seen and examined at bedside. Patient was treated with Zofran 4 mg and morphine 4 mg. Ordered DX-Chest, CT-CTA Complete thoracoabdominal aorta, Troponin, proBNP-NT, CBC w/ Differential, CMP, APTT, Prothrombin Time to evaluate his symptoms. The differential diagnoses include but are not limited to: Bowel Obstruction, Incarcerated hernia, Bowel Injury.     2:58 PM - Patient was reevaluated at bedside. Discussed lab and radiology results with the patient and informed them that there was no evidence of an abdominal aortic aneurysm or twisted bowels.    3:31 PM - Patient was treated for Zofran 4 mg and morphine 4 mg for his pain and vomiting.     4:15 PM - Patient was reevaluated at bedside. Patient reports he is still vomiting, which pushed his hernia out again. He states this symptom also occurs when he coughs. Patient was treated with Pepcid 20 mg.      5:52 PM - Patient was reevaluated at bedside. Patient  reports he is still vomiting. Ordered Lactic acid.     6:12 PM - I discussed the patient's case and the above findings with Dr. Meneses (Hospitalist) who agrees to consult the patient.     6:32 PM - I discussed the patient's case and the above findings with Dr. Morris (Surgery) who agrees to consult the patient.     Decision Making:  This is a patient who initially had a incarcerated hernia which reduced while a work-up being done in the emergency room.  On CT scans this appeared nauseous of the structure but this is now completely reduced.  However he has persistent continued abdominal pain and vomiting will hydrate and control symptoms surgical consultation requested although the hernia does seem completely reduced at this point    DISPOSITION:  Patient will be hospitalized by Dr. Meneses in guarded condition.      FINAL IMPRESSION   abdominal wall hernia  Nausea vomiting         Yuly WEBB (Scribe), am scribing for, and in the presence of, Brayan Rider M.D..    Electronically signed by: Yuly Fernandez (Scribe), 6/27/2021    IBrayan M.D. personally performed the services described in this documentation, as scribed by Yuly Fernandez in my presence, and it is both accurate and complete. C    The note accurately reflects work and decisions made by me.  Brayan Rider M.D.  6/27/2021  7:59 PM

## 2021-06-28 PROBLEM — R19.7 DIARRHEA: Status: ACTIVE | Noted: 2021-06-28

## 2021-06-28 PROBLEM — N17.9 AKI (ACUTE KIDNEY INJURY) (HCC): Status: ACTIVE | Noted: 2021-06-28

## 2021-06-28 LAB
ALBUMIN SERPL BCP-MCNC: 3.5 G/DL (ref 3.2–4.9)
ALBUMIN/GLOB SERPL: 1.2 G/DL
ALP SERPL-CCNC: 65 U/L (ref 30–99)
ALT SERPL-CCNC: 19 U/L (ref 2–50)
ANION GAP SERPL CALC-SCNC: 18 MMOL/L (ref 7–16)
AST SERPL-CCNC: 19 U/L (ref 12–45)
BASOPHILS # BLD AUTO: 0.4 % (ref 0–1.8)
BASOPHILS # BLD: 0.02 K/UL (ref 0–0.12)
BILIRUB SERPL-MCNC: 1 MG/DL (ref 0.1–1.5)
BUN SERPL-MCNC: 36 MG/DL (ref 8–22)
CALCIUM SERPL-MCNC: 8.9 MG/DL (ref 8.5–10.5)
CHLORIDE SERPL-SCNC: 106 MMOL/L (ref 96–112)
CHOLEST SERPL-MCNC: 119 MG/DL (ref 100–199)
CO2 SERPL-SCNC: 15 MMOL/L (ref 20–33)
CREAT SERPL-MCNC: 2.12 MG/DL (ref 0.5–1.4)
CRP SERPL HS-MCNC: 4.95 MG/DL (ref 0–0.75)
EOSINOPHIL # BLD AUTO: 0 K/UL (ref 0–0.51)
EOSINOPHIL NFR BLD: 0 % (ref 0–6.9)
ERYTHROCYTE [DISTWIDTH] IN BLOOD BY AUTOMATED COUNT: 43.8 FL (ref 35.9–50)
ERYTHROCYTE [SEDIMENTATION RATE] IN BLOOD BY WESTERGREN METHOD: <1 MM/HOUR (ref 0–20)
EST. AVERAGE GLUCOSE BLD GHB EST-MCNC: 111 MG/DL
GLOBULIN SER CALC-MCNC: 2.9 G/DL (ref 1.9–3.5)
GLUCOSE SERPL-MCNC: 180 MG/DL (ref 65–99)
HBA1C MFR BLD: 5.5 % (ref 4–5.6)
HCT VFR BLD AUTO: 57.5 % (ref 42–52)
HDLC SERPL-MCNC: 28 MG/DL
HGB BLD-MCNC: 19.5 G/DL (ref 14–18)
IMM GRANULOCYTES # BLD AUTO: 0.04 K/UL (ref 0–0.11)
IMM GRANULOCYTES NFR BLD AUTO: 0.7 % (ref 0–0.9)
INR PPP: 1.15 (ref 0.87–1.13)
LACTATE BLD-SCNC: 3 MMOL/L (ref 0.5–2)
LACTATE BLD-SCNC: 3.5 MMOL/L (ref 0.5–2)
LACTATE BLD-SCNC: 4 MMOL/L (ref 0.5–2)
LACTATE BLD-SCNC: 6.4 MMOL/L (ref 0.5–2)
LDLC SERPL CALC-MCNC: 68 MG/DL
LYMPHOCYTES # BLD AUTO: 0.51 K/UL (ref 1–4.8)
LYMPHOCYTES NFR BLD: 9.4 % (ref 22–41)
MCH RBC QN AUTO: 29.8 PG (ref 27–33)
MCHC RBC AUTO-ENTMCNC: 33.9 G/DL (ref 33.7–35.3)
MCV RBC AUTO: 87.9 FL (ref 81.4–97.8)
MONOCYTES # BLD AUTO: 0.69 K/UL (ref 0–0.85)
MONOCYTES NFR BLD AUTO: 12.8 % (ref 0–13.4)
NEUTROPHILS # BLD AUTO: 4.14 K/UL (ref 1.82–7.42)
NEUTROPHILS NFR BLD: 76.7 % (ref 44–72)
NRBC # BLD AUTO: 0 K/UL
NRBC BLD-RTO: 0 /100 WBC
PLATELET # BLD AUTO: 160 K/UL (ref 164–446)
PMV BLD AUTO: 12.2 FL (ref 9–12.9)
POTASSIUM SERPL-SCNC: 3.9 MMOL/L (ref 3.6–5.5)
PROCALCITONIN SERPL-MCNC: 9.23 NG/ML
PROT SERPL-MCNC: 6.4 G/DL (ref 6–8.2)
PROTHROMBIN TIME: 14.4 SEC (ref 12–14.6)
RBC # BLD AUTO: 6.54 M/UL (ref 4.7–6.1)
SODIUM SERPL-SCNC: 139 MMOL/L (ref 135–145)
TRIGL SERPL-MCNC: 115 MG/DL (ref 0–149)
WBC # BLD AUTO: 5.4 K/UL (ref 4.8–10.8)

## 2021-06-28 PROCEDURE — 700101 HCHG RX REV CODE 250: Performed by: STUDENT IN AN ORGANIZED HEALTH CARE EDUCATION/TRAINING PROGRAM

## 2021-06-28 PROCEDURE — 700111 HCHG RX REV CODE 636 W/ 250 OVERRIDE (IP): Performed by: STUDENT IN AN ORGANIZED HEALTH CARE EDUCATION/TRAINING PROGRAM

## 2021-06-28 PROCEDURE — 80053 COMPREHEN METABOLIC PANEL: CPT

## 2021-06-28 PROCEDURE — 700102 HCHG RX REV CODE 250 W/ 637 OVERRIDE(OP): Performed by: STUDENT IN AN ORGANIZED HEALTH CARE EDUCATION/TRAINING PROGRAM

## 2021-06-28 PROCEDURE — 80061 LIPID PANEL: CPT

## 2021-06-28 PROCEDURE — 86140 C-REACTIVE PROTEIN: CPT

## 2021-06-28 PROCEDURE — 85652 RBC SED RATE AUTOMATED: CPT

## 2021-06-28 PROCEDURE — A9270 NON-COVERED ITEM OR SERVICE: HCPCS | Performed by: STUDENT IN AN ORGANIZED HEALTH CARE EDUCATION/TRAINING PROGRAM

## 2021-06-28 PROCEDURE — 84145 PROCALCITONIN (PCT): CPT

## 2021-06-28 PROCEDURE — 700105 HCHG RX REV CODE 258: Performed by: INTERNAL MEDICINE

## 2021-06-28 PROCEDURE — 83036 HEMOGLOBIN GLYCOSYLATED A1C: CPT

## 2021-06-28 PROCEDURE — 700102 HCHG RX REV CODE 250 W/ 637 OVERRIDE(OP): Performed by: INTERNAL MEDICINE

## 2021-06-28 PROCEDURE — 87040 BLOOD CULTURE FOR BACTERIA: CPT | Mod: 91

## 2021-06-28 PROCEDURE — 85610 PROTHROMBIN TIME: CPT

## 2021-06-28 PROCEDURE — 36415 COLL VENOUS BLD VENIPUNCTURE: CPT

## 2021-06-28 PROCEDURE — 83605 ASSAY OF LACTIC ACID: CPT

## 2021-06-28 PROCEDURE — A9270 NON-COVERED ITEM OR SERVICE: HCPCS | Performed by: INTERNAL MEDICINE

## 2021-06-28 PROCEDURE — 770020 HCHG ROOM/CARE - TELE (206)

## 2021-06-28 PROCEDURE — 85025 COMPLETE CBC W/AUTO DIFF WBC: CPT

## 2021-06-28 PROCEDURE — 99233 SBSQ HOSP IP/OBS HIGH 50: CPT | Performed by: INTERNAL MEDICINE

## 2021-06-28 RX ORDER — MORPHINE SULFATE 4 MG/ML
1 INJECTION, SOLUTION INTRAMUSCULAR; INTRAVENOUS EVERY 4 HOURS PRN
Status: DISCONTINUED | OUTPATIENT
Start: 2021-06-28 | End: 2021-06-30 | Stop reason: HOSPADM

## 2021-06-28 RX ORDER — SODIUM CHLORIDE 9 MG/ML
INJECTION, SOLUTION INTRAVENOUS CONTINUOUS
Status: DISCONTINUED | OUTPATIENT
Start: 2021-06-28 | End: 2021-06-30 | Stop reason: HOSPADM

## 2021-06-28 RX ADMIN — METRONIDAZOLE 500 MG: 500 INJECTION, SOLUTION INTRAVENOUS at 11:21

## 2021-06-28 RX ADMIN — OXYCODONE HYDROCHLORIDE AND ACETAMINOPHEN 1 TABLET: 5; 325 TABLET ORAL at 20:54

## 2021-06-28 RX ADMIN — MORPHINE SULFATE 1 MG: 4 INJECTION INTRAVENOUS at 16:53

## 2021-06-28 RX ADMIN — VANCOMYCIN HYDROCHLORIDE 125 MG: KIT ORAL at 23:58

## 2021-06-28 RX ADMIN — VANCOMYCIN HYDROCHLORIDE 125 MG: KIT ORAL at 14:53

## 2021-06-28 RX ADMIN — SODIUM CHLORIDE, POTASSIUM CHLORIDE, SODIUM LACTATE AND CALCIUM CHLORIDE: 600; 310; 30; 20 INJECTION, SOLUTION INTRAVENOUS at 02:29

## 2021-06-28 RX ADMIN — MORPHINE SULFATE 1 MG: 4 INJECTION INTRAVENOUS at 10:28

## 2021-06-28 RX ADMIN — METRONIDAZOLE 500 MG: 500 INJECTION, SOLUTION INTRAVENOUS at 02:33

## 2021-06-28 RX ADMIN — OXYCODONE HYDROCHLORIDE AND ACETAMINOPHEN 1 TABLET: 5; 325 TABLET ORAL at 15:04

## 2021-06-28 RX ADMIN — HEPARIN SODIUM 5000 UNITS: 5000 INJECTION, SOLUTION INTRAVENOUS; SUBCUTANEOUS at 20:54

## 2021-06-28 RX ADMIN — CEFTRIAXONE SODIUM 2 G: 10 INJECTION, POWDER, FOR SOLUTION INTRAVENOUS at 02:29

## 2021-06-28 RX ADMIN — METRONIDAZOLE 500 MG: 500 INJECTION, SOLUTION INTRAVENOUS at 18:04

## 2021-06-28 RX ADMIN — VANCOMYCIN HYDROCHLORIDE 125 MG: KIT ORAL at 18:05

## 2021-06-28 RX ADMIN — HEPARIN SODIUM 5000 UNITS: 5000 INJECTION, SOLUTION INTRAVENOUS; SUBCUTANEOUS at 14:53

## 2021-06-28 RX ADMIN — SODIUM CHLORIDE: 9 INJECTION, SOLUTION INTRAVENOUS at 08:08

## 2021-06-28 RX ADMIN — MORPHINE SULFATE 1 MG: 4 INJECTION INTRAVENOUS at 05:46

## 2021-06-28 RX ADMIN — SODIUM CHLORIDE, POTASSIUM CHLORIDE, SODIUM LACTATE AND CALCIUM CHLORIDE: 600; 310; 30; 20 INJECTION, SOLUTION INTRAVENOUS at 05:48

## 2021-06-28 ASSESSMENT — ENCOUNTER SYMPTOMS
VOMITING: 1
ABDOMINAL PAIN: 1
FEVER: 0
WEIGHT LOSS: 0
PSYCHIATRIC NEGATIVE: 1
CHILLS: 0
CARDIOVASCULAR NEGATIVE: 1
NAUSEA: 1
DIARRHEA: 1
BLOOD IN STOOL: 0
MUSCULOSKELETAL NEGATIVE: 1
CONSTIPATION: 0
NEUROLOGICAL NEGATIVE: 1
RESPIRATORY NEGATIVE: 1
EYES NEGATIVE: 1

## 2021-06-28 ASSESSMENT — FIBROSIS 4 INDEX: FIB4 SCORE: 1.18

## 2021-06-28 ASSESSMENT — PAIN DESCRIPTION - PAIN TYPE: TYPE: ACUTE PAIN

## 2021-06-28 NOTE — ED NOTES
Attempted to call report, charge nurse in CDU asks that we keep for just few minutes as noc shift still in huddle.

## 2021-06-28 NOTE — PROGRESS NOTES
Lab called with critical result of 6.4. Critical lab result read back.   Dr. Ayden Monterroso notified of critical lab result.

## 2021-06-28 NOTE — PROGRESS NOTES
Hospital Medicine Daily Progress Note    Date of Service  6/28/2021    Chief Complaint  55 y.o. male admitted 6/27/2021 with abdominal pain    Hospital Course  55-year-old male with history of obesity recurrent ventral abdominal hernia presented 6/27 with abdominal pain and sticking out of hernia after coughing, patient had nausea vomiting, denied any fever or chills, patient usually reduce his hernia by himself, however after he was able to reduce his hernia, he still has pain with vomiting, patient had watery diarrhea, CT scan showed small bowel hernia possible obstruction, surgeon was consulted and recommended surgery during this admissio.    Patient has significant watery diarrhea, denied any using antibiotics for last 3 months however labs showed dehydration lactic acid 7.6 with creatinine 2.1 and  leukocytosis, C. difficile test is pending and start with oral vancomycin for possible C. difficile colitis also start with Flagyl.       Interval Problem Update  -Evaluated examined the patient at bedside, he has diarrhea possible C. difficile start with oral vancomycin at this time waiting for the result  -Lactic acidosis and JAGJIT, continue IV fluid, some improving  -Continue monitoring with general surgery team for possible surgery    Consultants/Specialty  General surgeon    Code Status  Full Code    Disposition  Home after clearance from general surgery team and improving on his diarrhea    Review of Systems  Review of Systems   Constitutional: Positive for malaise/fatigue. Negative for chills, fever and weight loss.   Eyes: Negative.    Respiratory: Negative.    Cardiovascular: Negative.    Gastrointestinal: Positive for abdominal pain, diarrhea, nausea and vomiting. Negative for blood in stool and constipation.   Genitourinary: Negative.    Musculoskeletal: Negative.    Neurological: Negative.    Psychiatric/Behavioral: Negative.         Physical Exam  Temp:  [36.1 °C (97 °F)-37.2 °C (98.9 °F)] 37.2 °C (98.9  °F)  Pulse:  [] 114  Resp:  [17-30] 18  BP: ()/() 114/78  SpO2:  [86 %-97 %] 93 %    Physical Exam  Constitutional:       Appearance: He is obese. He is not ill-appearing.   HENT:      Head: Normocephalic and atraumatic.   Eyes:      General: No scleral icterus.  Cardiovascular:      Rate and Rhythm: Normal rate.      Heart sounds: No murmur heard.     Pulmonary:      Effort: No respiratory distress.      Breath sounds: No wheezing.   Abdominal:      General: There is no distension.      Tenderness: There is abdominal tenderness. There is no guarding.   Musculoskeletal:         General: No tenderness or deformity.      Right lower leg: No edema.      Left lower leg: No edema.   Skin:     Coloration: Skin is not jaundiced.      Findings: No bruising, lesion or rash.   Neurological:      General: No focal deficit present.      Mental Status: He is alert and oriented to person, place, and time. Mental status is at baseline.      Cranial Nerves: No cranial nerve deficit.      Motor: No weakness.         Fluids    Intake/Output Summary (Last 24 hours) at 6/28/2021 1213  Last data filed at 6/28/2021 0600  Gross per 24 hour   Intake 0 ml   Output 800 ml   Net -800 ml       Laboratory  Recent Labs     06/27/21  1354 06/28/21  0247   WBC 13.9* 5.4   RBC 6.83* 6.54*   HEMOGLOBIN 20.3* 19.5*   HEMATOCRIT 59.9* 57.5*   MCV 87.7 87.9   MCH 29.7 29.8   MCHC 33.9 33.9   RDW 42.4 43.8   PLATELETCT 214 160*   MPV 12.2 12.2     Recent Labs     06/27/21  1354 06/28/21  0247   SODIUM 144 139   POTASSIUM 3.9 3.9   CHLORIDE 104 106   CO2 20 15*   GLUCOSE 176* 180*   BUN 21 36*   CREATININE 1.34 2.12*   CALCIUM 10.5 8.9     Recent Labs     06/27/21  1354 06/28/21  0247   APTT 23.4*  --    INR 1.00 1.15*         Recent Labs     06/28/21  0247   TRIGLYCERIDE 115   HDL 28*   LDL 68       Imaging  DX-CHEST-PORTABLE (1 VIEW)   Final Result      Hypoinflation without other evidence for acute cardiopulmonary disease.       CT-CTA COMPLETE THORACOABDOMINAL AORTA   Final Result      1.  There is no evidence of thoracic or abdominal aortic aneurysm or dissection.   2.  There is a large ventral abdominal wall hernia containing dilated loops of small bowel and mesentery with proximal obstructed small bowel loops measuring up to 5 cm in diameter.   3.  There is no pneumatosis or free peritoneal air.   4.  There is a stable 4 mm right lower lobe lung nodule consistent with a benign postinflammatory nodule. Stable since 2012, no further imaging is recommended.                 Assessment/Plan  * Incarcerated ventral hernia- (present on admission)  Assessment & Plan  h/o recurrent ventral abdominal hernia, s/p multiple hernia repair surgeries  CT showed large ventral hernia with obstruction of small bowel loops  After CT, patient self-reduced hernia  Still complaining of pain, nausea, vomiting  General surgery consulted; planning for surgery during this hospitalization    JAGJIT (acute kidney injury) (HCC)  Assessment & Plan  Likely related to prerenal and dehydration due to with diarrhea  Aggressive IV fluid  Labs daily and avoid nephrotoxic medication  No need for images at this time    Diarrhea  Assessment & Plan  Severe watery diarrhea with lactic acidosis and JAGJIT  IV fluid with oral vancomycin until better result of C. difficile test  Continue Flagyl and hold ceftriaxone until get C. difficile test  Continue monitoring    Lactic acidosis  Assessment & Plan  Lactic acid on admission 3.7>> worsening to 7.5  likely due bowel ischemia in incarcerated hernia and possible C. difficile  IV fluid  C. difficile test  Antibiotics as appropriate    Leukocytosis  Assessment & Plan  WBC on admission 13.9  With elevated inflammation markers and diarrhea  And lactic acidosis  Check C. difficile test, continue oral Vanco until we have the result  Flagyl and hold ceftriaxone    Small bowel obstruction (HCC)  Assessment & Plan  CT showed large ventral hernia  with obstruction of small bowel loops  General surgery consulted, follow recommendations    Obesity  Assessment & Plan  Body mass index is 39.05 kg/m².  Follow up with PCP for weight loss counseling       VTE prophylaxis: heparin

## 2021-06-28 NOTE — ASSESSMENT & PLAN NOTE
h/o recurrent ventral abdominal hernia, s/p multiple hernia repair surgeries  CT showed large ventral hernia with obstruction of small bowel loops  After CT, patient self-reduced hernia  Still complaining of pain, nausea, vomiting  General surgery following appreciate rec

## 2021-06-28 NOTE — PROGRESS NOTES
Pt arrived to [id number] via hospital bed with ACLS RN. All belongings accounted for. Pt hooked up to tele monitor, monitor room notified. Pt is .

## 2021-06-28 NOTE — ASSESSMENT & PLAN NOTE
WBC on admission 13.9  With elevated inflammation markers and diarrhea  And lactic acidosis  Check C. difficile test pending, continue oral Vanco until we have the result  Flagyl and hold ceftriaxone  Trended down

## 2021-06-28 NOTE — ASSESSMENT & PLAN NOTE
CT showed large ventral hernia with obstruction of small bowel loops  General surgery consulted, appreciate rec

## 2021-06-28 NOTE — CONSULTS
DATE OF CONSULTATION:  6/27/2021     REFERRING PHYSICIAN:   Brayan Rider M.D.     CONSULTING PHYSICIAN:  Carmelo Morris M.D.     REASON FOR CONSULTATION:  Evaluate patient with incarcerated incisional hernia.     HISTORY OF PRESENT ILLNESS:     This is a 55-year-old gentleman who has a history of multiple abdominal wall hernia repairs.  Patient reports that his initial surgery was that of an umbilical hernia repair.  He reports that this was complicated by recurrence and has had undergone multiple subsequent incisional hernia repairs.  The most recent hernia repair was in 2015 and performed in [place].  The patient had a recurrence after that surgery and he reports that earlier today the hernia became incarcerated.  He presented to the emergency department and underwent a CT scan of his abdomen which demonstrated what appeared to be an incarcerated incisional hernia with associated small bowel obstruction.  There was no pneumatosis or concern for strangulation based on the CT findings.  The patient reports that shortly after the CT was performed he was able to manually reduce the hernia.  Currently the patient's symptoms have improved.  On examination he has a palpable fascial defect in the region of his periumbilical region.  The area is soft and clearly the incarcerated hernia has been reduced.    PAST MEDICAL HISTORY:  has a past medical history of Hypertension and Pneumonia.,  Incisional hernias    PAST SURGICAL HISTORY:  has a past surgical history that includes other abdominal surgery; exploratory laparotomy (5/29/2012); and ventral hernia repair (5/29/2012).  Repair of recurrent incisional hernia 2015     ALLERGIES:   Allergies   Allergen Reactions   • Hydrocodone-Acetaminophen Anxiety     Headache     • Bactrim Ds Hives   • Other Drug Itching and Rash     Silk tape   • Tetanus Toxoid-Diphtheria Toxoid [Decavac]    • Sulfa Drugs Rash and Swelling        CURRENT MEDICATIONS:   Home Medications     Reviewed  "by Sheri Garcia (Pharmacy Tech) on 06/27/21 at 1857  Med List Status: Complete   Medication Last Dose Status   docusate sodium (COLACE) 100 MG Cap 6/26/2021 Active   lisinopril-hydrochlorothiazide (PRINZIDE) 20-12.5 MG per tablet 6/26/2021 Active   naproxen (ANAPROX) 220 MG tablet 6/26/2021 Active   omeprazole (PRILOSEC) 20 MG delayed-release capsule 6/26/2021 Active                FAMILY HISTORY: No family history on file.    SOCIAL HISTORY:   Social History     Tobacco Use   • Smoking status: Never Smoker   • Smokeless tobacco: Never Used   Vaping Use   • Vaping Use: Never used   Substance and Sexual Activity   • Alcohol use: Yes     Comment: Moderate   • Drug use: No   • Sexual activity: Not on file       REVIEW OF SYSTEMS:     Patient denies chest pain  Denies shortness of breath  Reports resolution of abdominal pain    PHYSICAL EXAMINATION:   General Appearance: The patient is a 55-year-old gentleman lying in bed no apparent distress  VITAL SIGNS: /69   Pulse (!) 130   Temp 36.1 °C (97 °F) (Temporal)   Resp 18   Ht 1.803 m (5' 11\")   Wt (!) 127 kg (280 lb)   SpO2 91%   HEAD AND NECK: Demonstrates symmetric, reactive pupils. Extraocular muscles   are intact. Nares and oropharynx are clear.   NECK: Supple. No adenopathy.  CHEST:    Auscultation: Clear to auscultation bilaterally  CARDIOVASCULAR:   Auscultation-regular rate and rhythm   Peripheral Pulses: Intact  ABDOMEN:   Inspection: Small area of excoriation of the skin in the region of the hernia   Palpation: Abdomen soft nondistended, palpable fascial defect in region of hernia, minimal abdominal tenderness on exam    EXTREMITIES:   Examination of the upper and lower extremities normal  NEUROLOGIC:   Neurologic examination reveals normal  PSYCHIATRIC: Normal affect      LABORATORY VALUES:   Recent Labs     06/27/21  1354   WBC 13.9*   RBC 6.83*   HEMOGLOBIN 20.3*   HEMATOCRIT 59.9*   MCV 87.7   MCH 29.7   MCHC 33.9   RDW 42.4   PLATELETCT " 214   MPV 12.2     Recent Labs     06/27/21  1354   SODIUM 144   POTASSIUM 3.9   CHLORIDE 104   CO2 20   GLUCOSE 176*   BUN 21   CREATININE 1.34   CALCIUM 10.5     Recent Labs     06/27/21  1354   ASTSGOT 26   ALTSGPT 30   TBILIRUBIN 2.1*   ALKPHOSPHAT 95   GLOBULIN 3.5   INR 1.00     Recent Labs     06/27/21  1354   APTT 23.4*   INR 1.00        IMAGING:   DX-CHEST-PORTABLE (1 VIEW)   Final Result      Hypoinflation without other evidence for acute cardiopulmonary disease.      CT-CTA COMPLETE THORACOABDOMINAL AORTA   Final Result      1.  There is no evidence of thoracic or abdominal aortic aneurysm or dissection.   2.  There is a large ventral abdominal wall hernia containing dilated loops of small bowel and mesentery with proximal obstructed small bowel loops measuring up to 5 cm in diameter.   3.  There is no pneumatosis or free peritoneal air.   4.  There is a stable 4 mm right lower lobe lung nodule consistent with a benign postinflammatory nodule. Stable since 2012, no further imaging is recommended.                IMPRESSION AND PLAN:    Incarcerated incisional hernia status post manual reduction  No clinical evidence of small bowel strangulation  Patient admitted by the hospitalist  N.p.o. after midnight  Recommend repair of incisional hernia during this hospitalization    ACS NSQIP Surgical Risk Calculator       ____________________________________     Carmelo Morris M.D.    DD: 6/27/2021  8:19 PM

## 2021-06-28 NOTE — PROGRESS NOTES
ISOLATION PRECAUTIONS EDUCATION    Educated PATIENT, FAMILY, S.O: patient on isolation for C DIFFICILE.    Educated on reason for isolation, how the infection may be transmitted, and how to help prevent transmission to others. Educated precautions involves staff and visitors wearing PPE, following Standard Precautions and performing meticulous hand hygiene in order to prevent transmission of infection.     Special Contact Precautions: Educated that Special Contact Precautions involves staff and visitors wearing gowns and gloves when in the patient room, and using soap and water for hand hygiene when exiting patient’s room.     Educated that patient may leave the room if they or continent of stool, but prior to exiting the patient room each time, the patient needs to have on a fresh patient gown, ensure the potentially infectious area is covered, and perform hand hygiene with soap and water immediately prior to exiting the room.    In addition, educated that alcohol based hand rub is NOT sufficient for hand hygiene for Special Contact Precautions. A bonnet is placed over the hand  dispenser inside the patients’ room as a reminder to wash hands with soap and water.       Patient transport and mobilization on unit  Educated that they may leave their room, but prior to exiting, the patient needs to have on a fresh patient gown, ensure the potentially infectious area is covered, performing appropriate hand hygiene immediately prior to exiting the room.

## 2021-06-28 NOTE — PROGRESS NOTES
Overnight Hospitalist Note      I was notified by charge nurse of patient's hypotension.    In summary, this is a 55-year-old gentleman, who presented with abdominal pain associated with a incarcerated hernia, which was manually reduced in the emergency room.  He was noted to be hypertensive blood pressure 118/144 on presentation.  However, throughout the course of his stay in the ER, patient had been becoming more tachycardic.  White count was elevated at 13.9 with a mild left shift.  His lactic acid was elevated 3.7.  Repeat lactic acid increased to 7.6.  Per nursing report, he received 875 mL of fluids from the ER to the floor.  It was felt that the lactic acidosis initially was secondary to his bowel ischemia and not secondary to sepsis on presentation.     Patient is currently tachycardic with heart rate in the 130s with blood pressure in the 80s to 90s/50s to 70s.  Given his white count of 14, he does need criteria for sepsis.    On physical exam, patient is alert and oriented x3.  He is nonfocal and his neurological exam.  Heart is tachycardic but regular.  Pulses are 2+ and symmetric.  Lungs are clear to auscultation bilaterally.  Abdomen is tender to palpation.  No cyanosis, clubbing, or edema.      I have reviewed the patient's CT scan.  Per my read there is herniation of bowel to the anterior portion of the abdomen with stool contents.  There is dilated loops of small loops of bowel consistent with small bowel obstruction.  There is also stool within the ascending colon.      Assessment and plan:  #Sepsis  #Incarcerated ventral hernia status post manual reduction  #Small bowel obstruction  #Lactic acidosis    I have administered fluids per sepsis protocol initiated antibiotics with IV metronidazole and ceftriaxone.  Likely source of infection is bowel secondary to his recent incarcerated ventral hernia and small bowel obstruction.  Procalcitonin is elevated at 9.23 suggestive of a infectious process.    I  have consulted Dr. Alexander of critical care for consideration of transfer to the ICU given his significant lactic acidosis.  However, he has clinical stability with improving lactic acidosis with continuing IV fluid administration and now with antibiotics.      Patient is critically ill.   The patient continues to have: Sepsis secondary to bowel ischemia and likely secondary infection with translocation of stool related bacteria  The vital organ system that is affected is the: Cardiovascular systems  If untreated there is a high chance of deterioration into: Septic shock, cardiovascular collapse, and eventually death.   The critical care that I am providing today is: Have initiated fluids per sepsis protocol and started on IV antibiotics.  I have repeatedly examined patient's vital signs and response to fluids as well as repeatedly checked labs.I have discussed the case with the critical care specialist and coordinated care with the nursing staff.    The critical that has been undertaken is medically complex.   There has been no overlap in critical care time.    Critical Care Time not including procedures: 39 mintues

## 2021-06-28 NOTE — PROGRESS NOTES
Bedside report received. Per night RN pt's been having frequent loose incontinent bowel movements. Seven in total.  BP has been on low side with Systolic 80's-90's. Tachycardic low 100's- 130's . Currently requiring 2 L via NC, with crackles in RLL.  Baseline RA. Patient A&O x 4. Lethargic. LA slightly improved currently 4.0 with LR running at 100 ml/hr. Complains of abdominal pain will medicate per MAR. POC discussed with patient. Pt verbalizes understanding. Call light and belongings with in reach. Bed locked and in lowest position, alarm and fall precautions in place.

## 2021-06-28 NOTE — CONSULTS
Patient seen and examined at bedside, with Dr. Alexander.    55-year-old male with past medical history of recurrent abdominal wall hernia requiring multiple hernia repairs who presented with recurrence of hernia for 2 days which the patient was not able to reduce himself.  He had severe abdominal pain in the hernia, more than 10 episodes of vomiting, and one episode of diarrhea.  Noted to have incarcerated abdominal wall hernia on CT abdomen, which was reduced manually by surgery.  ICU consulted as patient lactic acid is 6.4 despite sepsis bolus.  Upon evaluation, patient resting comfortably in bed, hemodynamically stable.  Denies abdominal pain, and has not had any further episodes of vomiting.  On examination, mild tenderness in the abdominal wall hernia, no rebound or guarding.    Recommendations:  -Continue IV fluid resuscitation  -Trend lactate every 4 hourly  -Transfer to telemetry  -ICU admission not indicated at this time, please call us if deterioration in clinical status

## 2021-06-28 NOTE — HOSPITAL COURSE
55-year-old male with history of obesity recurrent ventral abdominal hernia presented 6/27 with abdominal pain and sticking out of hernia after coughing, patient had nausea vomiting, denied any fever or chills, patient usually reduce his hernia by himself, however after he was able to reduce his hernia, he still has pain with vomiting, patient had watery diarrhea, CT scan showed small bowel hernia possible obstruction, surgeon was consulted and recommended surgery during this admissio.    Patient has significant watery diarrhea, denied any using antibiotics for last 3 months however labs showed dehydration lactic acid 7.6 with creatinine 2.1 and  leukocytosis, C. difficile test is pending and start with oral vancomycin for possible C. difficile colitis also start with Flagyl.

## 2021-06-28 NOTE — PROGRESS NOTES
Lab called with critical result of lactic 4.0 at 0330. Critical lab result read back to lab. This lab is trending downwards.

## 2021-06-28 NOTE — ASSESSMENT & PLAN NOTE
Improvement in acidosis and general pain.    He has had 5 major prior hernia repairs, will discuss with another surgeon for potential lap operative repair.   Dr Berrios consulting  Advancing diet as tolerated, will plan for outpatient followup for repair  OK to DC from surgery standpoint when tolerating diet

## 2021-06-28 NOTE — ED NOTES
Med rec complete per pt.     Reported Home Meds  Medication Sig   • omeprazole (PRILOSEC) 20 MG delayed-release capsule Take 20 mg by mouth every day.   • lisinopril-hydrochlorothiazide (PRINZIDE) 20-12.5 MG per tablet Take 1 tablet by mouth every day.   • naproxen (ANAPROX) 220 MG tablet Take 440 mg by mouth every morning.   • docusate sodium (COLACE) 100 MG Cap Take 100 mg by mouth every day.

## 2021-06-28 NOTE — H&P
Hospital Medicine History & Physical Note    Date of Service  6/27/2021    Primary Care Physician  Leobardo Flower M.D.    Consultants  General surgery    Code Status  Full Code    Chief Complaint  Chief Complaint   Patient presents with   • Abdominal Pain     middle, radiating to middle back        History of Presenting Illness  55 y.o. male with h/o obesity, recurrent ventral abdominal hernia, s/p multiple hernia repair surgeries who presented 6/27/2021 with pain and sticking out of hernia after cough, associated with nausea and vomiting, loose stool this morning. Patient also reported severe back pain, cough productive brown sputum for 1 months. Denies fever, chills SOB. Patient usually reduces hernia by himself. After CT scan he was able to reduce hernia by himself. Still complaining pain in midabdomen 10/10, nausea, vomiting.    CT showed large ventral hernia with obstruction of small bowel loops, 4 mm right lower lobe nodule.  I requested ER physician to call general surgery.    Review of Systems  Review of Systems   Constitutional: Positive for malaise/fatigue. Negative for chills and fever.   HENT: Negative for hearing loss.    Eyes: Negative.    Respiratory: Positive for cough and sputum production. Negative for shortness of breath.    Cardiovascular: Negative for chest pain and leg swelling.   Gastrointestinal: Positive for abdominal pain, nausea and vomiting.   Genitourinary: Negative for dysuria.   Musculoskeletal: Positive for back pain.   Skin: Negative for rash.   Neurological: Positive for weakness. Negative for headaches.       Past Medical History   has a past medical history of Hypertension and Pneumonia.    Surgical History   has a past surgical history that includes other abdominal surgery; exploratory laparotomy (5/29/2012); and ventral hernia repair (5/29/2012).     Family History  family history is not on file.     Social History   reports that he has never smoked. He has never used  smokeless tobacco. He reports current alcohol use. He reports that he does not use drugs.    Allergies  Allergies   Allergen Reactions   • Hydrocodone-Acetaminophen Anxiety     Headache     • Bactrim Ds Hives   • Other Drug Itching and Rash     Silk tape   • Tetanus Toxoid-Diphtheria Toxoid [Decavac]    • Sulfa Drugs Rash and Swelling       Medications  Prior to Admission Medications   Prescriptions Last Dose Informant Patient Reported? Taking?   Multiple Vitamins-Minerals (TAB-A-GARRISON) Tab   Yes No   Sig: Take 1 tablet by mouth.   benzonatate (TESSALON) 100 MG Cap   Yes No   Sig: TAKE 1 CAPSULE BY MOUTH EVERY DAY AS NEEDED FOR COUGH   colchicine (COLCRYS) 0.6 MG Tab   Yes No   docusate sodium (COLACE) 100 MG Cap   Yes No   Sig: Take 100 mg by mouth every day.   lactobacillus rhamnosus, GG, (CULTURELLE) 10 B CELL CAPS   No No   Sig: Take 1 Cap by mouth every day.   lisinopril-hydrochlorothiazide (PRINZIDE) 20-12.5 MG per tablet   Yes No   Sig: Take 1 tablet by mouth every day.   naproxen (ANAPROX) 220 MG tablet   Yes No   Sig: Take 440 mg by mouth every day.   omeprazole (PRILOSEC) 20 MG delayed-release capsule   Yes No   oxycodone-acetaminophen (PERCOCET) 5-325 MG TABS   No No   Sig: Take 1-2 Tabs by mouth every four hours as needed for Mild Pain.   oxycodone-acetaminophen (PERCOCET) 5-325 MG TABS   No No   Sig: Take 1-2 Tabs by mouth every four hours as needed (pain).   oxycodone-acetaminophen (PERCOCET) 5-325 MG TABS   No No   Sig: Take 1-2 Tabs by mouth every four hours as needed (pain).   paroxetine (PAXIL) 30 MG TABS   Yes No   Sig: Take 60 mg by mouth every day. Take with food   phentermine (ADIPEX-P) 37.5 MG tablet   Yes No   Sig: TAKE 1 TABLET ORALLY DAILY**E66.9** DS 60   phentermine 37.5 MG capsule   Yes No   Sig: Take 37.5 mg by mouth.   promethazine (PHENERGAN) 12.5 MG SUPP   No No   Sig: Insert 2 Suppositories in rectum every four hours as needed for Nausea/Vomiting (; if ondansetron ineffective and not  tolerating PO).   telmisartan (MICARDIS) 80 MG TABS   Yes No   Sig: Take 80 mg by mouth every day.      Facility-Administered Medications: None       Physical Exam  Temp:  [36.7 °C (98.1 °F)] 36.7 °C (98.1 °F)  Pulse:  [] 121  Resp:  [20-30] 20  BP: (124-201)/() 124/97  SpO2:  [86 %-96 %] 86 %    Physical Exam  Vitals and nursing note reviewed.   Constitutional:       Appearance: He is ill-appearing.   HENT:      Head: Normocephalic.      Mouth/Throat:      Mouth: Mucous membranes are moist.      Pharynx: Oropharynx is clear.   Eyes:      Pupils: Pupils are equal, round, and reactive to light.   Cardiovascular:      Rate and Rhythm: Regular rhythm. Tachycardia present.      Heart sounds: Normal heart sounds.   Pulmonary:      Effort: Pulmonary effort is normal. No respiratory distress.      Breath sounds: Normal breath sounds. No wheezing.   Abdominal:      General: Abdomen is flat.      Tenderness: There is abdominal tenderness. There is no guarding or rebound.      Hernia: A hernia is present.   Musculoskeletal:         General: Normal range of motion.      Cervical back: Normal range of motion.   Skin:     General: Skin is warm.      Comments: There is wound in ventral hernia, covered with pink granulation tissue, no discharge   Neurological:      General: No focal deficit present.      Mental Status: He is alert and oriented to person, place, and time.         Laboratory:  Recent Labs     06/27/21  1354   WBC 13.9*   RBC 6.83*   HEMOGLOBIN 20.3*   HEMATOCRIT 59.9*   MCV 87.7   MCH 29.7   MCHC 33.9   RDW 42.4   PLATELETCT 214   MPV 12.2     Recent Labs     06/27/21  1354   SODIUM 144   POTASSIUM 3.9   CHLORIDE 104   CO2 20   GLUCOSE 176*   BUN 21   CREATININE 1.34   CALCIUM 10.5     Recent Labs     06/27/21  1354   ALTSGPT 30   ASTSGOT 26   ALKPHOSPHAT 95   TBILIRUBIN 2.1*   GLUCOSE 176*     Recent Labs     06/27/21  1354   APTT 23.4*   INR 1.00     Recent Labs     06/27/21  1354   NTPROBNP 112          Recent Labs     06/27/21  1354   TROPONINT 14       Imaging:  DX-CHEST-PORTABLE (1 VIEW)   Final Result      Hypoinflation without other evidence for acute cardiopulmonary disease.      CT-CTA COMPLETE THORACOABDOMINAL AORTA   Final Result      1.  There is no evidence of thoracic or abdominal aortic aneurysm or dissection.   2.  There is a large ventral abdominal wall hernia containing dilated loops of small bowel and mesentery with proximal obstructed small bowel loops measuring up to 5 cm in diameter.   3.  There is no pneumatosis or free peritoneal air.   4.  There is a stable 4 mm right lower lobe lung nodule consistent with a benign postinflammatory nodule. Stable since 2012, no further imaging is recommended.                  Assessment/Plan:  I anticipate this patient will require at least two midnights for appropriate medical management, necessitating inpatient admission.    * Incarcerated ventral hernia- (present on admission)  Assessment & Plan  h/o recurrent ventral abdominal hernia, s/p multiple hernia repair surgeries  CT showed large ventral hernia with obstruction of small bowel loops  After CT, patient self-reduced hernia  Still complaining of pain, nausea, vomiting  General surgery consulted, follow recommendations    Small bowel obstruction (HCC)  Assessment & Plan  CT showed large ventral hernia with obstruction of small bowel loops  General surgery consulted, follow recommendations    Obesity  Assessment & Plan  Body mass index is 39.05 kg/m².  Follow up with PCP for weight loss counseling    Lactic acidosis  Assessment & Plan  Lactic acid on admission 3.7  Likely due bowel ischemia in incarcerated hernia  IVF  Trend lactic acid    Leukocytosis  Assessment & Plan  WBC on admission 13.9  Check procalcitonin  No indication for antibiotics at this time  Monitor

## 2021-06-28 NOTE — ASSESSMENT & PLAN NOTE
Likely related to prerenal and dehydration due to with diarrhea  Aggressive IV fluid  Labs daily and avoid nephrotoxic medication  No need for images at this time

## 2021-06-28 NOTE — PROGRESS NOTES
4 Eyes Skin Assessment Completed by WILLAM Richards and WILLAM Ackerman.    Head WDL  Ears Redness and Blanching  Nose WDL  Mouth WDL  Neck WDL  Breast/Chest WDL  Shoulder Blades Redness and Blanching  Spine Redness and Blanching  (R) Arm/Elbow/Hand Redness, Blanching, Bruising, Discoloration and Scar, tear on hand   (L) Arm/Elbow/Hand Redness, Blanching, Bruising, Discoloration and Scar  Abdomen large hernia, wound below hernia   Groin Redness and Excoriation  Scrotum/Coccyx/Buttocks Redness, Blanching and Excoriation  (R) Leg WDL  (L) Leg WDL  (R) Heel/Foot/Toe Redness and Blanching  (L) Heel/Foot/Toe Redness and Blanching          Devices In Places Tele Box, SCD's and Nasal Cannula      Interventions In Place NC W/Ear Foams and Pressure Redistribution Mattress    Possible Skin Injury Yes    Pictures Uploaded Into Epic No, needs to be completed  Wound Consult Placed Yes  RN Wound Prevention Protocol Ordered Yes

## 2021-06-28 NOTE — PROGRESS NOTES
"    DATE: 6/28/2021    Hospital Day 1 recurrent hernia with incarceration.    Interval Events:  Hernia reduced.  Abdomen soft.  Generally a little tender, no nausea or vomiting.  Having some diarrhea.    PHYSICAL EXAMINATION:  Constitutional:     Vital Signs: /78   Pulse (!) 114   Temp 37.2 °C (98.9 °F) (Temporal)   Resp 18   Ht 1.803 m (5' 11\")   Wt 120 kg (264 lb 12.4 oz)   SpO2 93%    General Appearance: The patient is a pleasant  man in no critical distress.  HEENT:    The pupils are equal, round, and reactive to light bilaterally. The extraocular muscles are intact bilaterally. The nares and oropharynx are clear. Normal range of motion.  Respiratory:   Inspection: Unlabored respirations, no intercostal retractions, paradoxical motion, or accessory muscle use.   Palpation:  The chest is nontender.    Auscultation: normal.  Cardiovascular:   Inspection: The skin is warm.  Auscultation: Sinus tachycardia.   Peripheral Pulses: Normal.   Abdomen:   Inspection: Abdominal inspection reveals multiple prior incisions, excoriated skin large ventral hernia.   Palpation: Palpation is remarkable for mild tenderness in the generalized region.  Musculoskeletal:   Examination of the upper and lower extremities reveals no abnormality.  Skin:    Examination of the skin reveals no significant abrasions, contusion, lacerations, or other soft tissue injury.  Neurologic:    Neurologic examination reveals no focal deficits noted.  Psychiatric:   The patient does not appear depressed or anxious.    Laboratory Values:   Recent Labs     06/27/21  1354 06/28/21  0247   WBC 13.9* 5.4   RBC 6.83* 6.54*   HEMOGLOBIN 20.3* 19.5*   HEMATOCRIT 59.9* 57.5*   MCV 87.7 87.9   MCH 29.7 29.8   MCHC 33.9 33.9   RDW 42.4 43.8   PLATELETCT 214 160*   MPV 12.2 12.2     Recent Labs     06/27/21  1354 06/28/21  0247   SODIUM 144 139   POTASSIUM 3.9 3.9   CHLORIDE 104 106   CO2 20 15*   GLUCOSE 176* 180*   BUN 21 36*   CREATININE 1.34 2.12* "   CALCIUM 10.5 8.9     Recent Labs     06/27/21  1354 06/28/21  0247   ASTSGOT 26 19   ALTSGPT 30 19   TBILIRUBIN 2.1* 1.0   ALKPHOSPHAT 95 65   GLOBULIN 3.5 2.9   INR 1.00 1.15*     Recent Labs     06/27/21  1354 06/28/21  0247   APTT 23.4*  --    INR 1.00 1.15*        Imaging:   DX-CHEST-PORTABLE (1 VIEW)   Final Result      Hypoinflation without other evidence for acute cardiopulmonary disease.      CT-CTA COMPLETE THORACOABDOMINAL AORTA   Final Result      1.  There is no evidence of thoracic or abdominal aortic aneurysm or dissection.   2.  There is a large ventral abdominal wall hernia containing dilated loops of small bowel and mesentery with proximal obstructed small bowel loops measuring up to 5 cm in diameter.   3.  There is no pneumatosis or free peritoneal air.   4.  There is a stable 4 mm right lower lobe lung nodule consistent with a benign postinflammatory nodule. Stable since 2012, no further imaging is recommended.                ASSESSMENT AND PLAN:     * Incarcerated ventral hernia- (present on admission)  Assessment & Plan  Improvement in acidosis and general pain.    Clear liquid diet.   He has had 5 major prior hernia repairs, will discuss with another surgeon for potential lap operative repair.       DISPOSITION: Continue nonoperative management and close clinical observation. Will discuss potential repair       ____________________________________     Karey Collado M.D.    DD: 6/28/2021  12:03 PM

## 2021-06-28 NOTE — WOUND TEAM
"[facility]  Inpatient Services  Initial Wound and Skin Care Evaluation    Admission Date: 6/27/2021     Last order of IP CONSULT TO WOUND CARE was found on 6/28/2021 from Hospital Encounter on 6/27/2021     HPI, PMH, SH: Reviewed    Past Surgical History:   Procedure Laterality Date   • EXPLORATORY LAPAROTOMY  5/29/2012    Performed by TERRY SHANKS at SURGERY [facility] ORS   • VENTRAL HERNIA REPAIR  5/29/2012    Performed by TERRY SHANKS at [facility] ORS   • OTHER ABDOMINAL SURGERY      hernia  repair w 3 surgeries     Social History     Tobacco Use   • Smoking status: Never Smoker   • Smokeless tobacco: Never Used   Substance Use Topics   • Alcohol use: Yes     Comment: Moderate     Chief Complaint   Patient presents with   • Abdominal Pain     middle, radiating to middle back      Diagnosis: Ventral hernia with bowel obstruction [K43.6]  Sepsis (HCC) [A41.9]    Unit where seen by Wound Team: T826/00     WOUND CONSULT/FOLLOW UP RELATED TO: abdomen    WOUND HISTORY:  \"55 y.o. male with h/o obesity, recurrent ventral abdominal hernia, s/p multiple hernia repair surgeries who presented 6/27/2021 with pain and sticking out of hernia after cough, associated with nausea and vomiting, loose stool this morning. Patient also reported severe back pain, cough productive brown sputum for 1 months. Denies fever, chills SOB. Patient usually reduces hernia by himself. After CT scan he was able to reduce hernia by himself. Still complaining pain in midabdomen 10/10, nausea, vomiting.     CT showed large ventral hernia with obstruction of small bowel loops, 4 mm right lower lobe nodule.  I requested ER physician to call general surgery.\"  WOUND ASSESSMENT/LDA  Wound 05/11/21 Abdomen Midline (Active)      06/28/21 1500   Site Assessment Red    Periwound Assessment Other (Comment);Intact    Margins Defined edges    Closure Secondary intention    Drainage Amount Scant    Drainage " Description Serosanguineous    Treatments Cleansed    Wound Cleansing Normal Saline Irrigation    Periwound Protectant Skin Protectant Wipes to Periwound    Dressing Cleansing/Solutions Normal Saline    Dressing Options Hydrofera Blue Classic;Silicone Adhesive Foam    Dressing Changed Changed    Dressing Status Intact    Dressing Change/Treatment Frequency Every 72 hrs, and As Needed    NEXT Dressing Change/Treatment Date 07/01/21    NEXT Weekly Photo (Inpatient Only) 07/05/21    Non-staged Wound Description Full thickness 06/28/21 1500   Wound Length (cm) 1.7 cm 06/28/21 1500   Wound Width (cm) 3 cm 06/28/21 1500   Wound Depth (cm) 0.2 cm 06/28/21 1500   Wound Surface Area (cm^2) 5.1 cm^2 06/28/21 1500   Wound Volume (cm^3) 1.02 cm^3 06/28/21 1500   Wound Healing % 54 06/28/21 1500   Shape oval    Wound Odor None    Exposed Structures None    Number of days: 48        Vascular:    GUERA:   No results found.    Lab Values:    Lab Results   Component Value Date/Time    WBC 5.4 06/28/2021 02:47 AM    RBC 6.54 (H) 06/28/2021 02:47 AM    HEMOGLOBIN 19.5 (H) 06/28/2021 02:47 AM    HEMATOCRIT 57.5 (H) 06/28/2021 02:47 AM    CREACTPROT 4.95 (H) 06/28/2021 02:47 AM    SEDRATEWES <1 06/28/2021 02:47 AM    HBA1C 5.5 06/28/2021 02:47 AM        Culture Results show:  No results found for this or any previous visit (from the past 720 hour(s)).    Pain Level/Medicated: no c/o pain  INTERVENTIONS BY WOUND TEAM:  Chart and images reviewed. Discussed with bedside RN. This RN in to assess patient. Performed standard wound care which includes appropriate positioning, dressing removal and non-selective debridement.   Preparation for Dressing removal: Dressing soaked with NS  Cleansed with:  NS  and gauze.  Sharp debridement: NA  Roseann wound: Cleansed with NS, Prepped with No Sting  Primary Dressing: HFB classic moistened with NS  Secondary (Outer) Dressing: adhesive foam    Interdisciplinary consultation: Patient, Bedside RN    EVALUATION  "/ RATIONALE FOR TREATMENT:  Most Recent Date:  6/28: Pt has resolving full thickness wound that is chronic \"1 year\" per pt.     Goals: Steady decrease in wound area and depth weekly.    WOUND TEAM PLAN OF CARE ([X] for frequency of wound follow up,):   Nursing to follow orders written for wound care. Contact wound team if area fails to progress, deteriorates or with any questions/concerns  Dressing changes by wound team:                   Follow up 3 times weekly:                NPWT change 3 times weekly:     Follow up 1-2 times weekly:      Follow up Bi-Monthly:                   Follow up as needed:  If wound worsens   Other (explain):     NURSING PLAN OF CARE ORDERS (X):  Dressing changes: See Dressing Care orders: x  Skin care: See Skin Care orders:   RN Prevention Protocol:   Rectal tube care: See Rectal Tube Care orders:   Other orders:    RSKIN:   CURRENTLY IN PLACE (X), APPLIED THIS VISIT (A), ORDERED (O):   Q shift Sumanth:  X  Q shift pressure point assessments:  X    Surface/Positioning   Pressure redistribution mattress  x          Low Airloss          Bariatric foam      Bariatric DYLAN     Waffle cushion        Waffle Overlay          Reposition q 2 hours   Remind pt   TAPs Turning system     Z Cezar Pillow     Offloading/Redistribution not assessed  Sacral Mepilex (Silicone dressing)     Heel Mepilex (Silicone dressing)         Heel float boots (Prevalon boot)             Float Heels off Bed with Pillows           Respiratory   Silicone O2 tubing   x      Gray Foam Ear protectors     Cannula fixation Device (Tender )          High flow offloading Clip    Elastic head band offloading device      Anchorfast                                                         Trach with Optifoam split foam             Containment/Moisture Prevention not assessed   Rectal tube or BMS    Purwick/Condom Cath        Gomes Catheter    Barrier wipes           Barrier paste       Antifungal tx      Interdry    "     Mobilization not assessed      Up to chair        Ambulate      PT/OT      Nutrition       Dietician        Diabetes Education      PO     TF     TPN     NPO  1 # days     Other        Anticipated discharge plans: outpt wound clinic, last seen 6/22 may need new referral  LTACH:        SNF/Rehab:                  Home Health Care:           Outpatient Wound Center:            Self/Family Care:        Other:

## 2021-06-28 NOTE — PROGRESS NOTES
Florian moved over to room 826 due to concern for C- Diff. Diagnostic panel ordered, precautions in place. Report given to Trinh QUARLES.

## 2021-06-28 NOTE — ASSESSMENT & PLAN NOTE
Lactic acid on admission 3.7>> worsening to 7.5  likely due bowel ischemia in incarcerated hernia and possible C. difficile  IV fluid  C. difficile test  Antibiotics as appropriate

## 2021-06-28 NOTE — ASSESSMENT & PLAN NOTE
Severe watery diarrhea with lactic acidosis and JAGJIT  IV fluid with oral vancomycin until better result of C. difficile test  Continue Flagyl and hold ceftriaxone until get C. difficile test  Continue monitoring

## 2021-06-29 ENCOUNTER — APPOINTMENT (OUTPATIENT)
Dept: WOUND CARE | Facility: MEDICAL CENTER | Age: 56
End: 2021-06-29
Attending: STUDENT IN AN ORGANIZED HEALTH CARE EDUCATION/TRAINING PROGRAM
Payer: COMMERCIAL

## 2021-06-29 PROBLEM — A41.9 SEVERE SEPSIS (HCC): Status: ACTIVE | Noted: 2021-06-29

## 2021-06-29 PROBLEM — R65.20 SEVERE SEPSIS (HCC): Status: ACTIVE | Noted: 2021-06-29

## 2021-06-29 LAB
ALBUMIN SERPL BCP-MCNC: 2.9 G/DL (ref 3.2–4.9)
ALBUMIN/GLOB SERPL: 1.2 G/DL
ALP SERPL-CCNC: 42 U/L (ref 30–99)
ALT SERPL-CCNC: 15 U/L (ref 2–50)
ANION GAP SERPL CALC-SCNC: 9 MMOL/L (ref 7–16)
AST SERPL-CCNC: 19 U/L (ref 12–45)
BASOPHILS # BLD AUTO: 0.9 % (ref 0–1.8)
BASOPHILS # BLD: 0.06 K/UL (ref 0–0.12)
BILIRUB SERPL-MCNC: 1.1 MG/DL (ref 0.1–1.5)
BUN SERPL-MCNC: 26 MG/DL (ref 8–22)
CALCIUM SERPL-MCNC: 7.3 MG/DL (ref 8.5–10.5)
CHLORIDE SERPL-SCNC: 109 MMOL/L (ref 96–112)
CO2 SERPL-SCNC: 23 MMOL/L (ref 20–33)
CREAT SERPL-MCNC: 1.19 MG/DL (ref 0.5–1.4)
CRP SERPL HS-MCNC: 17.76 MG/DL (ref 0–0.75)
EOSINOPHIL # BLD AUTO: 0.39 K/UL (ref 0–0.51)
EOSINOPHIL NFR BLD: 6.2 % (ref 0–6.9)
ERYTHROCYTE [DISTWIDTH] IN BLOOD BY AUTOMATED COUNT: 44.9 FL (ref 35.9–50)
GLOBULIN SER CALC-MCNC: 2.4 G/DL (ref 1.9–3.5)
GLUCOSE SERPL-MCNC: 114 MG/DL (ref 65–99)
HCT VFR BLD AUTO: 46.5 % (ref 42–52)
HGB BLD-MCNC: 15.9 G/DL (ref 14–18)
LACTATE BLD-SCNC: 1.2 MMOL/L (ref 0.5–2)
LACTATE BLD-SCNC: 1.2 MMOL/L (ref 0.5–2)
LACTATE BLD-SCNC: 1.4 MMOL/L (ref 0.5–2)
LYMPHOCYTES # BLD AUTO: 1.12 K/UL (ref 1–4.8)
LYMPHOCYTES NFR BLD: 17.7 % (ref 22–41)
MAGNESIUM SERPL-MCNC: 1.5 MG/DL (ref 1.5–2.5)
MANUAL DIFF BLD: ABNORMAL
MCH RBC QN AUTO: 30.2 PG (ref 27–33)
MCHC RBC AUTO-ENTMCNC: 34.2 G/DL (ref 33.7–35.3)
MCV RBC AUTO: 88.2 FL (ref 81.4–97.8)
MONOCYTES # BLD AUTO: 0.84 K/UL (ref 0–0.85)
MONOCYTES NFR BLD AUTO: 13.3 % (ref 0–13.4)
MORPHOLOGY BLD-IMP: NORMAL
NEUTROPHILS # BLD AUTO: 3.9 K/UL (ref 1.82–7.42)
NEUTROPHILS NFR BLD: 18.6 % (ref 44–72)
NEUTS BAND NFR BLD MANUAL: 43.3 % (ref 0–10)
NRBC # BLD AUTO: 0 K/UL
NRBC BLD-RTO: 0 /100 WBC
PLATELET # BLD AUTO: 136 K/UL (ref 164–446)
PLATELET BLD QL SMEAR: NORMAL
PMV BLD AUTO: 12.3 FL (ref 9–12.9)
POTASSIUM SERPL-SCNC: 3.7 MMOL/L (ref 3.6–5.5)
PROCALCITONIN SERPL-MCNC: 4.97 NG/ML
PROT SERPL-MCNC: 5.3 G/DL (ref 6–8.2)
RBC # BLD AUTO: 5.27 M/UL (ref 4.7–6.1)
RBC BLD AUTO: NORMAL
SODIUM SERPL-SCNC: 141 MMOL/L (ref 135–145)
WBC # BLD AUTO: 6.3 K/UL (ref 4.8–10.8)

## 2021-06-29 PROCEDURE — 700111 HCHG RX REV CODE 636 W/ 250 OVERRIDE (IP): Performed by: STUDENT IN AN ORGANIZED HEALTH CARE EDUCATION/TRAINING PROGRAM

## 2021-06-29 PROCEDURE — 700101 HCHG RX REV CODE 250: Performed by: STUDENT IN AN ORGANIZED HEALTH CARE EDUCATION/TRAINING PROGRAM

## 2021-06-29 PROCEDURE — 700102 HCHG RX REV CODE 250 W/ 637 OVERRIDE(OP): Performed by: STUDENT IN AN ORGANIZED HEALTH CARE EDUCATION/TRAINING PROGRAM

## 2021-06-29 PROCEDURE — A9270 NON-COVERED ITEM OR SERVICE: HCPCS | Performed by: INTERNAL MEDICINE

## 2021-06-29 PROCEDURE — 83735 ASSAY OF MAGNESIUM: CPT

## 2021-06-29 PROCEDURE — 99232 SBSQ HOSP IP/OBS MODERATE 35: CPT | Performed by: INTERNAL MEDICINE

## 2021-06-29 PROCEDURE — 700105 HCHG RX REV CODE 258: Performed by: STUDENT IN AN ORGANIZED HEALTH CARE EDUCATION/TRAINING PROGRAM

## 2021-06-29 PROCEDURE — 700102 HCHG RX REV CODE 250 W/ 637 OVERRIDE(OP): Performed by: INTERNAL MEDICINE

## 2021-06-29 PROCEDURE — A9270 NON-COVERED ITEM OR SERVICE: HCPCS | Performed by: STUDENT IN AN ORGANIZED HEALTH CARE EDUCATION/TRAINING PROGRAM

## 2021-06-29 PROCEDURE — 85007 BL SMEAR W/DIFF WBC COUNT: CPT

## 2021-06-29 PROCEDURE — 86140 C-REACTIVE PROTEIN: CPT

## 2021-06-29 PROCEDURE — 770020 HCHG ROOM/CARE - TELE (206)

## 2021-06-29 PROCEDURE — 83605 ASSAY OF LACTIC ACID: CPT | Mod: 91

## 2021-06-29 PROCEDURE — 85027 COMPLETE CBC AUTOMATED: CPT

## 2021-06-29 PROCEDURE — 84145 PROCALCITONIN (PCT): CPT

## 2021-06-29 PROCEDURE — 80053 COMPREHEN METABOLIC PANEL: CPT

## 2021-06-29 RX ADMIN — HEPARIN SODIUM 5000 UNITS: 5000 INJECTION, SOLUTION INTRAVENOUS; SUBCUTANEOUS at 21:52

## 2021-06-29 RX ADMIN — VANCOMYCIN HYDROCHLORIDE 125 MG: KIT ORAL at 16:30

## 2021-06-29 RX ADMIN — METRONIDAZOLE 500 MG: 500 INJECTION, SOLUTION INTRAVENOUS at 16:30

## 2021-06-29 RX ADMIN — HEPARIN SODIUM 5000 UNITS: 5000 INJECTION, SOLUTION INTRAVENOUS; SUBCUTANEOUS at 13:25

## 2021-06-29 RX ADMIN — METRONIDAZOLE 500 MG: 500 INJECTION, SOLUTION INTRAVENOUS at 01:36

## 2021-06-29 RX ADMIN — OXYCODONE HYDROCHLORIDE AND ACETAMINOPHEN 1 TABLET: 5; 325 TABLET ORAL at 13:25

## 2021-06-29 RX ADMIN — OXYCODONE HYDROCHLORIDE AND ACETAMINOPHEN 1 TABLET: 5; 325 TABLET ORAL at 19:13

## 2021-06-29 RX ADMIN — VANCOMYCIN HYDROCHLORIDE 125 MG: KIT ORAL at 04:00

## 2021-06-29 RX ADMIN — METRONIDAZOLE 500 MG: 500 INJECTION, SOLUTION INTRAVENOUS at 11:14

## 2021-06-29 RX ADMIN — HEPARIN SODIUM 5000 UNITS: 5000 INJECTION, SOLUTION INTRAVENOUS; SUBCUTANEOUS at 04:00

## 2021-06-29 RX ADMIN — SODIUM CHLORIDE, POTASSIUM CHLORIDE, SODIUM LACTATE AND CALCIUM CHLORIDE 1000 ML: 600; 310; 30; 20 INJECTION, SOLUTION INTRAVENOUS at 16:30

## 2021-06-29 RX ADMIN — OXYCODONE HYDROCHLORIDE AND ACETAMINOPHEN 1 TABLET: 5; 325 TABLET ORAL at 03:26

## 2021-06-29 RX ADMIN — VANCOMYCIN HYDROCHLORIDE 125 MG: KIT ORAL at 23:36

## 2021-06-29 RX ADMIN — OMEPRAZOLE 20 MG: 20 CAPSULE, DELAYED RELEASE ORAL at 04:00

## 2021-06-29 RX ADMIN — VANCOMYCIN HYDROCHLORIDE 125 MG: KIT ORAL at 12:20

## 2021-06-29 ASSESSMENT — FIBROSIS 4 INDEX: FIB4 SCORE: 1.91

## 2021-06-29 ASSESSMENT — ENCOUNTER SYMPTOMS
WEIGHT LOSS: 0
VOMITING: 1
DIARRHEA: 1
FEVER: 0
NEUROLOGICAL NEGATIVE: 1
CHILLS: 0
PSYCHIATRIC NEGATIVE: 1
CARDIOVASCULAR NEGATIVE: 1
CONSTIPATION: 0
MUSCULOSKELETAL NEGATIVE: 1
ABDOMINAL PAIN: 1
BLOOD IN STOOL: 0
RESPIRATORY NEGATIVE: 1
NAUSEA: 1
EYES NEGATIVE: 1

## 2021-06-29 ASSESSMENT — PAIN DESCRIPTION - PAIN TYPE
TYPE: ACUTE PAIN

## 2021-06-29 NOTE — CARE PLAN
Problem: Knowledge Deficit - Standard  Goal: Patient and family/care givers will demonstrate understanding of plan of care, disease process/condition, diagnostic tests and medications  Outcome: Progressing      Shift Goals  Clinical Goals: Obtain referral to abdominal surgeon  Patient Goals: Maintain comfort  Plan of care discussed-pt verbalizes undertstanding    Patient is not progressing towards the following goals:

## 2021-06-29 NOTE — DISCHARGE PLANNING
Anticipated Discharge Disposition: Home    Action: Discussed in IDT rounds; pt is not medically cleared ofr discharge. Pending CDiff test, possible start of oral Vanco/Flagyl. MD anticipates 1-2 more days. Per surgery, pt will follow up as an outpatient for elective hernia surgery. MD anticipates no needs when medically cleared.     Barriers to Discharge:   Medical clearance  R/O CDiff     Plan: Care coordination will follow and assist with discharge needs.

## 2021-06-29 NOTE — PROGRESS NOTES
Bedside report received from WILLAM Donaldson. Call light and belongings within reach. Bed locked and in lowest position. Alarm and fall precautions in place.

## 2021-06-29 NOTE — CONSULTS
General Surgery Consult          CHIEF COMPLAINT: Incarcerated recurrent ventral incisional hernia.     Requesting Physician:  Dr. Collado    HISTORY OF PRESENT ILLNESS: The patient is a 55 y.o. male, who presents with the above complaints.  He was able to reduce this while in the hospital.  He is currently not exhibiting evidence of obstruction.  He initially had a suture repair, followed by a mesh repair.  This failed then he underwent an open repair with transverse incision and possible component separation however this is unclear.  He is recently lost approximately 50 pounds and is continuing to lose weight.     BMI:Body mass index is 36.93 kg/m².     PAST MEDICAL HISTORY:  has a past medical history of Hypertension and Pneumonia.     PAST SURGICAL HISTORY:  has a past surgical history that includes other abdominal surgery; exploratory laparotomy (5/29/2012); and ventral hernia repair (5/29/2012).     ALLERGIES:   Allergies   Allergen Reactions   • Hydrocodone-Acetaminophen Anxiety     Headache     • Bactrim Ds Hives   • Other Drug Itching and Rash     Silk tape   • Tetanus Toxoid-Diphtheria Toxoid [Decavac]    • Sulfa Drugs Rash and Swelling        CURRENT MEDICATIONS:   Home Medications     Reviewed by Sheri Garcia (Pharmacy Tech) on 06/27/21 at 1857  Med List Status: Complete   Medication Last Dose Status   docusate sodium (COLACE) 100 MG Cap 6/26/2021 Active   lisinopril-hydrochlorothiazide (PRINZIDE) 20-12.5 MG per tablet 6/26/2021 Active   naproxen (ANAPROX) 220 MG tablet 6/26/2021 Active   omeprazole (PRILOSEC) 20 MG delayed-release capsule 6/26/2021 Active                FAMILY HISTORY: No family history on file.     SOCIAL HISTORY:   Social History     Tobacco Use   • Smoking status: Never Smoker   • Smokeless tobacco: Never Used   Vaping Use   • Vaping Use: Never used   Substance and Sexual Activity   • Alcohol use: Yes     Comment: Moderate   • Drug use: No   • Sexual activity: Not on file  "      REVIEW OF SYSTEMS: Comprehensive review of systems was negative aside from initially abdominal pain and nausea    PHYSICAL EXAMINATION:     GENERAL: The patient is in no acute distress.   VITAL SIGNS: /84   Pulse 78   Temp 36.6 °C (97.8 °F) (Temporal)   Resp 18   Ht 1.803 m (5' 11\")   Wt 120 kg (264 lb 12.4 oz)   SpO2 97%   HEAD AND NECK: Demonstrates symmetric, reactive pupils. Extraocular muscles   are intact. Nares and oropharynx are clear.   NECK: Supple. No adenopathy.  CHEST:No respiratory distress.    CARDIOVASCULAR: Regular rate. The extremities are well perfused.   ABDOMEN: Visible intestine beneath the skin in a midline hernia just above the umbilicus.  He has a nonhealing wound in the overlying skin without evidence of a fistula.  I am able to mobilize this away from the underlying bowel on exam.  His abdominal wall is compliant and appears to be a candidate for repair without loss of domain.   EXTREMITIES: Examination of the upper and lower extremities demonstrates no cyanosis edema or clubbing.  NEUROLOGIC: Alert & oriented x 3, Normal motor function, Normal sensory function, No focal deficits noted.    LABORATORY VALUES:   Recent Labs     06/27/21  1354 06/28/21  0247 06/29/21  0253   WBC 13.9* 5.4 6.3   RBC 6.83* 6.54* 5.27   HEMOGLOBIN 20.3* 19.5* 15.9   HEMATOCRIT 59.9* 57.5* 46.5   MCV 87.7 87.9 88.2   MCH 29.7 29.8 30.2   MCHC 33.9 33.9 34.2   RDW 42.4 43.8 44.9   PLATELETCT 214 160* 136*   MPV 12.2 12.2 12.3     Recent Labs     06/27/21  1354 06/28/21  0247 06/29/21  0253   SODIUM 144 139 141   POTASSIUM 3.9 3.9 3.7   CHLORIDE 104 106 109   CO2 20 15* 23   GLUCOSE 176* 180* 114*   BUN 21 36* 26*   CREATININE 1.34 2.12* 1.19   CALCIUM 10.5 8.9 7.3*     Recent Labs     06/27/21  1354 06/28/21  0247 06/29/21  0253   ASTSGOT 26 19 19   ALTSGPT 30 19 15   TBILIRUBIN 2.1* 1.0 1.1   ALKPHOSPHAT 95 65 42   GLOBULIN 3.5 2.9 2.4   INR 1.00 1.15*  --      Recent Labs     06/27/21  1354 " 06/28/21  0247   APTT 23.4*  --    INR 1.00 1.15*        IMAGING:   DX-CHEST-PORTABLE (1 VIEW)   Final Result      Hypoinflation without other evidence for acute cardiopulmonary disease.      CT-CTA COMPLETE THORACOABDOMINAL AORTA   Final Result      1.  There is no evidence of thoracic or abdominal aortic aneurysm or dissection.   2.  There is a large ventral abdominal wall hernia containing dilated loops of small bowel and mesentery with proximal obstructed small bowel loops measuring up to 5 cm in diameter.   3.  There is no pneumatosis or free peritoneal air.   4.  There is a stable 4 mm right lower lobe lung nodule consistent with a benign postinflammatory nodule. Stable since 2012, no further imaging is recommended.                    Problem List:    Patient Active Problem List    Diagnosis Date Noted   • Abdominal pain, acute 05/30/2012   • Incarcerated ventral hernia 05/30/2012   • Polycythemia 05/30/2012   • Hyperglycemia 05/30/2012   • Severe sepsis (HCC) 06/29/2021   • Diarrhea 06/28/2021   • JAGJIT (acute kidney injury) ([facility]) 06/28/2021   • Leukocytosis 06/27/2021   • Lactic acidosis 06/27/2021   • Small bowel obstruction (HCC) 06/27/2021   • Obesity 06/27/2021       IMPRESSION AND PLAN:     1.  Complex recurrent abdominal wall hernia.  2.  Obesity    1.  After reviewing his CAT scan the lateral components appear to be intact.  The external oblique, internal oblique and transverse abdominis muscles appear intact.  The linea semilunaris appears intact.  The rectus complex is not.  He possibly underwent a retrorectus repair/release in [place] however I have asked the patient to obtain his records for my review prior to consideration for repair.  I believe he is a good candidate for a robotic assisted repair of a complex recurrent incisional hernia with bilateral transversus abdominis release.  I would also at that time excise his nonhealing wound.    2.  Since he does not have obstructive symptoms any longer I  would recommend advancing his diet and allowing for discharge with close outpatient follow-up.  In the interim he will obtain his operative report from [place] so I can review this prior to scheduling him for an elective repair.  The patient can follow-up with me closely in my office at [phone].  I have encouraged him to continue weight loss in the interim.    3.  Thank you for allowing me to participate in this patient's care.              ___________________________________   Billy Berrios M.D.    [facility].  [phone]    DD: 6/29/2021 DT: 9:24 AM

## 2021-06-29 NOTE — ASSESSMENT & PLAN NOTE
This is Severe Sepsis Present on admission  SIRS criteria identified on my evaluation include: Tachycardia, with heart rate greater than 90 BPM and Leukocytosis, with WBC greater than 12,000  Source is GI  Clinical indicators of end organ dysfunction include Systolic blood pressure (SBP) <90 mmHg or mean arterial pressure <65 mmHg, Systolic blood pressure decrease of more than 40 mmHg and Creatinine >2.0 (Without ESRD or CKD)  Sepsis protocol initiated  Fluid resuscitation ordered per protocol  IV antibiotics as appropriate for source of sepsis: ceftriaxone and metronidazole  Reassessment: I have reassessed the patient's hemodynamic status  End organ dysfunction include(s):  Acute kidney failure    - serum creatinine: 1.19 mg/dL (06/29/2021 2:53:00) from prior 2.12 mg/dL (06/28/2021 2:47:00)  - baseline creatinine of 1.20 mg/dL

## 2021-06-29 NOTE — PROGRESS NOTES
[facility] Medicine Daily Progress Note    Date of Service  6/29/2021    Chief Complaint  55 y.o. male admitted 6/27/2021 with abdominal pain    Hospital Course  55-year-old male with history of obesity recurrent ventral abdominal hernia presented 6/27 with abdominal pain and sticking out of hernia after coughing, patient had nausea vomiting, denied any fever or chills, patient usually reduce his hernia by himself, however after he was able to reduce his hernia, he still has pain with vomiting, patient had watery diarrhea, CT scan showed small bowel hernia possible obstruction, surgeon was consulted and recommended surgery during this admissio.    Patient has significant watery diarrhea, denied any using antibiotics for last 3 months however labs showed dehydration lactic acid 7.6 with creatinine 2.1 and  leukocytosis, C. difficile test is pending and start with oral vancomycin for possible C. difficile colitis also start with Flagyl.       Interval Problem Update  Patient seen and examined, afebrile, no acute events overnight af, he has diarrhea possible C. difficile start with oral vancomycin at this time waiting for the result  -Lactic acidosis and JAGJIT, continue IV fluid, some improving  General surgery following appreciate rec.     Consultants/Specialty  General surgeon    Code Status  Full Code    Disposition  Home after clearance from general surgery team and improving on his diarrhea    Review of Systems  Review of Systems   Constitutional: Positive for malaise/fatigue. Negative for chills, fever and weight loss.   Eyes: Negative.    Respiratory: Negative.    Cardiovascular: Negative.    Gastrointestinal: Positive for abdominal pain, diarrhea, nausea and vomiting. Negative for blood in stool and constipation.   Genitourinary: Negative.    Musculoskeletal: Negative.    Neurological: Negative.    Psychiatric/Behavioral: Negative.         Physical Exam  Temp:  [36.4 °C (97.6 °F)-37.2 °C (98.9 °F)] 36.4 °C (97.6  °F)  Pulse:  [] 68  Resp:  [18] 18  BP: (114-127)/(60-84) 119/60  SpO2:  [93 %-98 %] 98 %    Physical Exam  Constitutional:       Appearance: He is obese. He is not ill-appearing.   HENT:      Head: Normocephalic and atraumatic.   Eyes:      General: No scleral icterus.  Cardiovascular:      Rate and Rhythm: Normal rate.      Heart sounds: No murmur heard.     Pulmonary:      Effort: No respiratory distress.      Breath sounds: No wheezing.   Abdominal:      General: There is no distension.      Tenderness: There is abdominal tenderness. There is no guarding.   Musculoskeletal:         General: No tenderness or deformity.      Right lower leg: No edema.      Left lower leg: No edema.   Skin:     Coloration: Skin is not jaundiced.      Findings: No bruising, lesion or rash.   Neurological:      General: No focal deficit present.      Mental Status: He is alert and oriented to person, place, and time. Mental status is at baseline.      Cranial Nerves: No cranial nerve deficit.      Motor: No weakness.         Fluids    Intake/Output Summary (Last 24 hours) at 6/29/2021 1153  Last data filed at 6/28/2021 1904  Gross per 24 hour   Intake --   Output 350 ml   Net -350 ml       Laboratory  Recent Labs     06/27/21  1354 06/28/21  0247 06/29/21  0253   WBC 13.9* 5.4 6.3   RBC 6.83* 6.54* 5.27   HEMOGLOBIN 20.3* 19.5* 15.9   HEMATOCRIT 59.9* 57.5* 46.5   MCV 87.7 87.9 88.2   MCH 29.7 29.8 30.2   MCHC 33.9 33.9 34.2   RDW 42.4 43.8 44.9   PLATELETCT 214 160* 136*   MPV 12.2 12.2 12.3     Recent Labs     06/27/21  1354 06/28/21  0247 06/29/21  0253   SODIUM 144 139 141   POTASSIUM 3.9 3.9 3.7   CHLORIDE 104 106 109   CO2 20 15* 23   GLUCOSE 176* 180* 114*   BUN 21 36* 26*   CREATININE 1.34 2.12* 1.19   CALCIUM 10.5 8.9 7.3*     Recent Labs     06/27/21  1354 06/28/21  0247   APTT 23.4*  --    INR 1.00 1.15*         Recent Labs     06/28/21  0247   TRIGLYCERIDE 115   HDL 28*   LDL 68       Imaging  DX-CHEST-PORTABLE (1  VIEW)   Final Result      Hypoinflation without other evidence for acute cardiopulmonary disease.      CT-CTA COMPLETE THORACOABDOMINAL AORTA   Final Result      1.  There is no evidence of thoracic or abdominal aortic aneurysm or dissection.   2.  There is a large ventral abdominal wall hernia containing dilated loops of small bowel and mesentery with proximal obstructed small bowel loops measuring up to 5 cm in diameter.   3.  There is no pneumatosis or free peritoneal air.   4.  There is a stable 4 mm right lower lobe lung nodule consistent with a benign postinflammatory nodule. Stable since 2012, no further imaging is recommended.                 Assessment/Plan  * Severe sepsis (HCC)- (present on admission)  Assessment & Plan  This is Severe Sepsis Present on admission  SIRS criteria identified on my evaluation include: Tachycardia, with heart rate greater than 90 BPM and Leukocytosis, with WBC greater than 12,000  Source is GI  Clinical indicators of end organ dysfunction include Systolic blood pressure (SBP) <90 mmHg or mean arterial pressure <65 mmHg, Systolic blood pressure decrease of more than 40 mmHg and Creatinine >2.0 (Without ESRD or CKD)  Sepsis protocol initiated  Fluid resuscitation ordered per protocol  IV antibiotics as appropriate for source of sepsis: ceftriaxone and metronidazole  Reassessment: I have reassessed the patient's hemodynamic status  End organ dysfunction include(s):  Acute kidney failure    - serum creatinine: 1.19 mg/dL (06/29/2021 2:53:00) from prior 2.12 mg/dL (06/28/2021 2:47:00)  - baseline creatinine of 1.20 mg/dL    JAGJIT (acute kidney injury) (HCC)  Assessment & Plan  Likely related to prerenal and dehydration due to with diarrhea  Aggressive IV fluid  Labs daily and avoid nephrotoxic medication  No need for images at this time    Diarrhea  Assessment & Plan  Severe watery diarrhea with lactic acidosis and JAGJIT  IV fluid with oral vancomycin until better result of C.  difficile test  Continue Flagyl and hold ceftriaxone until get C. difficile test  Continue monitoring    Obesity  Assessment & Plan  Body mass index is 39.05 kg/m².  Follow up with PCP for weight loss counseling    Small bowel obstruction (HCC)  Assessment & Plan  CT showed large ventral hernia with obstruction of small bowel loops  General surgery consulted, appreciate rec     Lactic acidosis  Assessment & Plan  Lactic acid on admission 3.7>> worsening to 7.5  likely due bowel ischemia in incarcerated hernia and possible C. difficile  IV fluid  C. difficile test  Antibiotics as appropriate    Leukocytosis  Assessment & Plan  WBC on admission 13.9  With elevated inflammation markers and diarrhea  And lactic acidosis  Check C. difficile test pending, continue oral Vanco until we have the result  Flagyl and hold ceftriaxone  Trended down     Incarcerated ventral hernia- (present on admission)  Assessment & Plan  h/o recurrent ventral abdominal hernia, s/p multiple hernia repair surgeries  CT showed large ventral hernia with obstruction of small bowel loops  After CT, patient self-reduced hernia  Still complaining of pain, nausea, vomiting  General surgery following appreciate rec        VTE prophylaxis: heparin

## 2021-06-29 NOTE — CARE PLAN
The patient is Stable - Low risk of patient condition declining or worsening    Shift Goals  Clinical Goals: Obtain referral to abdominal surgeon  Patient Goals: Maintain comfort    Progress made toward(s) clinical / shift goals:  Progressing      Problem: Knowledge Deficit - Standard  Goal: Patient and family/care givers will demonstrate understanding of plan of care, disease process/condition, diagnostic tests and medications  Outcome: Progressing  Note: Pt verbalizes understanding of plan of care and has no questions at this time.     Problem: Pain - Standard  Goal: Alleviation of pain or a reduction in pain to the patient’s comfort goal  Outcome: Progressing  Note: Pt's pain remaining controlled on current regimen at this time.

## 2021-06-30 VITALS
SYSTOLIC BLOOD PRESSURE: 122 MMHG | TEMPERATURE: 98.2 F | HEART RATE: 65 BPM | BODY MASS INDEX: 37.19 KG/M2 | WEIGHT: 265.65 LBS | OXYGEN SATURATION: 99 % | HEIGHT: 71 IN | DIASTOLIC BLOOD PRESSURE: 70 MMHG | RESPIRATION RATE: 18 BRPM

## 2021-06-30 LAB
ANION GAP SERPL CALC-SCNC: 6 MMOL/L (ref 7–16)
BUN SERPL-MCNC: 13 MG/DL (ref 8–22)
CALCIUM SERPL-MCNC: 7.9 MG/DL (ref 8.5–10.5)
CHLORIDE SERPL-SCNC: 108 MMOL/L (ref 96–112)
CO2 SERPL-SCNC: 28 MMOL/L (ref 20–33)
CREAT SERPL-MCNC: 1.11 MG/DL (ref 0.5–1.4)
ERYTHROCYTE [DISTWIDTH] IN BLOOD BY AUTOMATED COUNT: 43.3 FL (ref 35.9–50)
GLUCOSE SERPL-MCNC: 97 MG/DL (ref 65–99)
HCT VFR BLD AUTO: 45.3 % (ref 42–52)
HGB BLD-MCNC: 15 G/DL (ref 14–18)
LACTATE BLD-SCNC: 1 MMOL/L (ref 0.5–2)
MCH RBC QN AUTO: 29.4 PG (ref 27–33)
MCHC RBC AUTO-ENTMCNC: 33.1 G/DL (ref 33.7–35.3)
MCV RBC AUTO: 88.8 FL (ref 81.4–97.8)
PLATELET # BLD AUTO: 136 K/UL (ref 164–446)
PMV BLD AUTO: 12.4 FL (ref 9–12.9)
POTASSIUM SERPL-SCNC: 3.7 MMOL/L (ref 3.6–5.5)
RBC # BLD AUTO: 5.1 M/UL (ref 4.7–6.1)
SODIUM SERPL-SCNC: 142 MMOL/L (ref 135–145)
WBC # BLD AUTO: 6.7 K/UL (ref 4.8–10.8)

## 2021-06-30 PROCEDURE — A9270 NON-COVERED ITEM OR SERVICE: HCPCS | Performed by: INTERNAL MEDICINE

## 2021-06-30 PROCEDURE — 99239 HOSP IP/OBS DSCHRG MGMT >30: CPT | Performed by: INTERNAL MEDICINE

## 2021-06-30 PROCEDURE — 85027 COMPLETE CBC AUTOMATED: CPT

## 2021-06-30 PROCEDURE — 700111 HCHG RX REV CODE 636 W/ 250 OVERRIDE (IP): Performed by: STUDENT IN AN ORGANIZED HEALTH CARE EDUCATION/TRAINING PROGRAM

## 2021-06-30 PROCEDURE — 700102 HCHG RX REV CODE 250 W/ 637 OVERRIDE(OP): Performed by: STUDENT IN AN ORGANIZED HEALTH CARE EDUCATION/TRAINING PROGRAM

## 2021-06-30 PROCEDURE — 83605 ASSAY OF LACTIC ACID: CPT

## 2021-06-30 PROCEDURE — 700101 HCHG RX REV CODE 250: Performed by: STUDENT IN AN ORGANIZED HEALTH CARE EDUCATION/TRAINING PROGRAM

## 2021-06-30 PROCEDURE — 700105 HCHG RX REV CODE 258: Performed by: INTERNAL MEDICINE

## 2021-06-30 PROCEDURE — 80048 BASIC METABOLIC PNL TOTAL CA: CPT

## 2021-06-30 PROCEDURE — A9270 NON-COVERED ITEM OR SERVICE: HCPCS | Performed by: STUDENT IN AN ORGANIZED HEALTH CARE EDUCATION/TRAINING PROGRAM

## 2021-06-30 PROCEDURE — 700102 HCHG RX REV CODE 250 W/ 637 OVERRIDE(OP): Performed by: INTERNAL MEDICINE

## 2021-06-30 RX ADMIN — OMEPRAZOLE 20 MG: 20 CAPSULE, DELAYED RELEASE ORAL at 06:24

## 2021-06-30 RX ADMIN — OXYCODONE HYDROCHLORIDE AND ACETAMINOPHEN 1 TABLET: 5; 325 TABLET ORAL at 01:46

## 2021-06-30 RX ADMIN — METRONIDAZOLE 500 MG: 500 INJECTION, SOLUTION INTRAVENOUS at 00:54

## 2021-06-30 RX ADMIN — VANCOMYCIN HYDROCHLORIDE 125 MG: KIT ORAL at 06:24

## 2021-06-30 RX ADMIN — SODIUM CHLORIDE 1000 ML: 9 INJECTION, SOLUTION INTRAVENOUS at 00:54

## 2021-06-30 RX ADMIN — HEPARIN SODIUM 5000 UNITS: 5000 INJECTION, SOLUTION INTRAVENOUS; SUBCUTANEOUS at 06:24

## 2021-06-30 ASSESSMENT — COGNITIVE AND FUNCTIONAL STATUS - GENERAL
MOBILITY SCORE: 19
MOVING FROM LYING ON BACK TO SITTING ON SIDE OF FLAT BED: A LITTLE
TURNING FROM BACK TO SIDE WHILE IN FLAT BAD: A LITTLE
PERSONAL GROOMING: A LITTLE
DRESSING REGULAR LOWER BODY CLOTHING: A LITTLE
SUGGESTED CMS G CODE MODIFIER MOBILITY: CK
WALKING IN HOSPITAL ROOM: A LITTLE
HELP NEEDED FOR BATHING: A LITTLE
DRESSING REGULAR UPPER BODY CLOTHING: A LITTLE
DAILY ACTIVITIY SCORE: 18
SUGGESTED CMS G CODE MODIFIER DAILY ACTIVITY: CK
TOILETING: A LITTLE
STANDING UP FROM CHAIR USING ARMS: A LITTLE
CLIMB 3 TO 5 STEPS WITH RAILING: A LITTLE
EATING MEALS: A LITTLE

## 2021-06-30 NOTE — PROGRESS NOTES
"    DATE: 6/30/2021    Hospital Day 3 SBO and incisional hernia.    Interval Events:  Tolerating diet and pain resolved for the most part.  Plan to follow up with Dr Berrios as outpatient    PHYSICAL EXAMINATION:  Constitutional:     Vital Signs: /70   Pulse 65   Temp 36.8 °C (98.2 °F) (Temporal)   Resp 18   Ht 1.803 m (5' 11\")   Wt 121 kg (265 lb 10.5 oz)   SpO2 99%    General Appearance: The patient is a pleasant  man in no critical distress.  HEENT:    The pupils are equal, round, and reactive to light bilaterally. The extraocular muscles are intact bilaterally. The nares and oropharynx are clear. Normal range of motion.  Respiratory:   Inspection: Unlabored respirations, no intercostal retractions, paradoxical motion, or accessory muscle use.   Palpation:  The chest is nontender.    Auscultation: normal.  Cardiovascular:   Inspection: The skin is warm and dry.  Auscultation: Regular rate and rhythm.   Peripheral Pulses: Normal.   Abdomen:   Inspection: Abdominal inspection reveals large hernia with skin excoration.   Palpation: Palpation is remarkable for no significant tenderness, guarding, or peritoneal findings.  Skin:    Examination of the skin reveals no significant abrasions, contusion, lacerations, or other soft tissue injury.  Neurologic:    Neurologic examination reveals no focal deficits noted.  Psychiatric:   The patient does not appear depressed or anxious.    Laboratory Values:   Recent Labs     06/28/21 0247 06/29/21  0253 06/30/21  0041   WBC 5.4 6.3 6.7   RBC 6.54* 5.27 5.10   HEMOGLOBIN 19.5* 15.9 15.0   HEMATOCRIT 57.5* 46.5 45.3   MCV 87.9 88.2 88.8   MCH 29.8 30.2 29.4   MCHC 33.9 34.2 33.1*   RDW 43.8 44.9 43.3   PLATELETCT 160* 136* 136*   MPV 12.2 12.3 12.4     Recent Labs     06/28/21 0247 06/29/21  0253 06/30/21  0041   SODIUM 139 141 142   POTASSIUM 3.9 3.7 3.7   CHLORIDE 106 109 108   CO2 15* 23 28   GLUCOSE 180* 114* 97   BUN 36* 26* 13   CREATININE 2.12* 1.19 1.11 "   CALCIUM 8.9 7.3* 7.9*     Recent Labs     06/27/21  1354 06/28/21  0247 06/29/21  0253   ASTSGOT 26 19 19   ALTSGPT 30 19 15   TBILIRUBIN 2.1* 1.0 1.1   ALKPHOSPHAT 95 65 42   GLOBULIN 3.5 2.9 2.4   INR 1.00 1.15*  --      Recent Labs     06/27/21  1354 06/28/21  0247   APTT 23.4*  --    INR 1.00 1.15*        Imaging:   DX-CHEST-PORTABLE (1 VIEW)   Final Result      Hypoinflation without other evidence for acute cardiopulmonary disease.      CT-CTA COMPLETE THORACOABDOMINAL AORTA   Final Result      1.  There is no evidence of thoracic or abdominal aortic aneurysm or dissection.   2.  There is a large ventral abdominal wall hernia containing dilated loops of small bowel and mesentery with proximal obstructed small bowel loops measuring up to 5 cm in diameter.   3.  There is no pneumatosis or free peritoneal air.   4.  There is a stable 4 mm right lower lobe lung nodule consistent with a benign postinflammatory nodule. Stable since 2012, no further imaging is recommended.                ASSESSMENT AND PLAN:     Incarcerated ventral hernia- (present on admission)  Assessment & Plan  Improvement in acidosis and general pain.    He has had 5 major prior hernia repairs, will discuss with another surgeon for potential lap operative repair.   Dr Berrios consulting  Advancing diet as tolerated, will plan for outpatient followup for repair  OK to DC from surgery standpoint when tolerating diet      DISPOSITION: Improving clinical condition.       ____________________________________     Karey Collado M.D.    DD: 6/30/2021  8:46 AM

## 2021-06-30 NOTE — PROGRESS NOTES
D/C'd.  Discharge instructions provided to pt.  Pt states understanding.  Pt states all questions have been answered.  Copy of discharge provided to pt.  Signed copy in chart.  Pt states that all personal belongings are in possession. Awaiting ride.

## 2021-06-30 NOTE — DISCHARGE SUMMARY
Discharge Summary    CHIEF COMPLAINT ON ADMISSION  Chief Complaint   Patient presents with   • Abdominal Pain     middle, radiating to middle back        Reason for Admission  abd pain     Admission Date  6/27/2021    CODE STATUS  Full Code    HPI & HOSPITAL COURSE  This is a 55 y.o. male with history of obesity recurrent ventral abdominal hernia presented 6/27 with abdominal pain and sticking out of hernia after coughing, patient had nausea vomiting, denied any fever or chills, patient usually reduce his hernia by himself, however after he was able to reduce his hernia, he still has pain with vomiting, patient had watery diarrhea, CT scan showed small bowel hernia possible obstruction, surgeon was consulted and evaluated patient as per surgery no surgery during this admission and patient will need to,follow up as outpatient with surgery   Patient also had increased lactic acid which also resolved with hydration. There was concern for c.diff initially but his diarrhea resolved.  Patient has been started back on diet by surgery and tolerating it well.  Per surgery can be discharged home.  He feeling better today so will discharge patient home today with surgery follow up as outpatient     The patient met 2-midnight criteria for an inpatient stay at the time of discharge.    Discharge Date  6/30/21    FOLLOW UP ITEMS POST DISCHARGE  Surgery    DISCHARGE DIAGNOSES  Principal Problem:    Severe sepsis (HCC) POA: Yes  Active Problems:    Incarcerated ventral hernia POA: Yes    Leukocytosis POA: Unknown    Lactic acidosis POA: Unknown    Small bowel obstruction (HCC) POA: Unknown    Obesity POA: Unknown    Diarrhea POA: Unknown    JAGJIT (acute kidney injury) (HCC) POA: Unknown  Resolved Problems:    * No resolved hospital problems. *      FOLLOW UP  Future Appointments   Date Time Provider Department Center   7/6/2021  8:00 AM KIARA Rivers [place] [address]   7/13/2021  8:00 AM ANYA Chairez [address]    7/20/2021  8:00 AM CARLY Garcia [facility] [address]   7/27/2021  8:00 AM Jennifer Garcia R.N. [facility] [address]     Billy Berrios M.D.  [address] [facility] [address]  [place] [address]  [phone]    Call in 1 week  To review operative report from [place]    Karey Collado M.D.  [address] [facility] [address]  [place] [id number] [phone]      Please follow up with surgical team for outpatient follow up for repair.     Leobardo Flower M.D.  [address] [facility] [address]  [place] [id number] [phone]    Schedule an appointment as soon as possible for a visit        MEDICATIONS ON DISCHARGE     Medication List      CONTINUE taking these medications      Instructions   docusate sodium 100 MG Caps  Commonly known as: COLACE   Take 100 mg by mouth every day.  Dose: 100 mg     lisinopril-hydrochlorothiazide 20-12.5 MG per tablet  Commonly known as: PRINZIDE   Take 1 tablet by mouth every day.  Dose: 1 tablet     omeprazole 20 MG delayed-release capsule  Commonly known as: PRILOSEC   Take 20 mg by mouth every day.  Dose: 20 mg        STOP taking these medications    naproxen 220 MG tablet  Commonly known as: ANAPROX            Allergies  Allergies   Allergen Reactions   • Hydrocodone-Acetaminophen Anxiety     Headache     • Bactrim Ds Hives   • Other Drug Itching and Rash     Silk tape   • Tetanus Toxoid-Diphtheria Toxoid [Decavac]    • Sulfa Drugs Rash and Swelling       DIET  Orders Placed This Encounter   Procedures   • Diet Order Diet: Regular     Standing Status:   Standing     Number of Occurrences:   1     Order Specific Question:   Diet:     Answer:   Regular [1]       ACTIVITY  As tolerated.  Weight bearing as tolerated    CONSULTATIONS  Surgery     PROCEDURES  None     LABORATORY  Lab Results   Component Value Date    SODIUM 142 06/30/2021    POTASSIUM 3.7 06/30/2021    CHLORIDE 108 06/30/2021    CO2 28 06/30/2021    GLUCOSE 97 06/30/2021    BUN 13 06/30/2021    CREATININE 1.11 06/30/2021        Lab Results    Component Value Date    WBC 6.7 06/30/2021    HEMOGLOBIN 15.0 06/30/2021    HEMATOCRIT 45.3 06/30/2021    PLATELETCT 136 (L) 06/30/2021        Total time of the discharge process exceeds 41 minutes.

## 2021-06-30 NOTE — DISCHARGE INSTRUCTIONS
Discharge Instructions    Discharged to home by car with relative. Discharged via wheelchair, hospital escort: Yes.  Special equipment needed: Not Applicable    Be sure to schedule a follow-up appointment with your primary care doctor or any specialists as instructed.     Discharge Plan:   Diet Plan: Discussed  Activity Level: Discussed  Confirmed Follow up Appointment: Patient to Call and Schedule Appointment  Confirmed Symptoms Management: Discussed  Medication Reconciliation Updated: Yes    I understand that a diet low in cholesterol, fat, and sodium is recommended for good health. Unless I have been given specific instructions below for another diet, I accept this instruction as my diet prescription.   Other diet: regular    Special Instructions:   Sepsis, Diagnosis, Adult  Sepsis is a serious bodily reaction to an infection. The infection that triggers sepsis may be from a bacteria, virus, or fungus. Sepsis can result from an infection in any part of your body. Infections that commonly lead to sepsis include skin, lung, and urinary tract infections.  Sepsis is a medical emergency that must be treated right away in a hospital. In severe cases, it can lead to septic shock. Septic shock can weaken your heart and cause your blood pressure to drop. This can cause your central nervous system and your body's organs to stop working.  What are the causes?  This condition is caused by a severe reaction to infections from bacteria, viruses, or fungus. The germs that most often lead to sepsis include:  · Escherichia coli (E. coli) bacteria.  · Staphylococcus aureus (staph) bacteria.  · Some types of Streptococcus bacteria.  The most common infections affect these organs:  · The lung (pneumonia).  · The kidneys or bladder (urinary tract infection).  · The skin (cellulitis).  · The bowel, gallbladder, or pancreas.  What increases the risk?  You are more likely to develop this condition if:  · Your body's disease-fighting  system (immune system) is weakened.  · You are age 65 or older.  · You are male.  · You had surgery or you have been hospitalized.  · You have these devices inserted into your body:  ? A small, thin tube (catheter).  ? IV line.  ? Breathing tube.  ? Drainage tube.  · You are not getting enough nutrients from food (malnourished).  · You have a long-term (chronic) disease, such as cancer, lung disease, kidney disease, or diabetes.  · You are .  What are the signs or symptoms?  Symptoms of this condition may include:  · Fever.  · Chills or feeling very cold.  · Confusion or anxiety.  · Fatigue.  · Muscle aches.  · Shortness of breath.  · Nausea and vomiting.  · Urinating much less than usual.  · Fast heart rate (tachycardia).  · Rapid breathing (hyperventilation).  · Changes in skin color. Your skin may look blotchy, pale, or blue.  · Cool, clammy, or sweaty skin.  · Skin rash.  Other symptoms depend on the source of your infection.  How is this diagnosed?  This condition is diagnosed based on:  · Your symptoms.  · Your medical history.  · A physical exam.  Other tests may also be done to find out the cause of the infection and how severe the sepsis is. These tests may include:  · Blood tests.  · Urine tests.  · Swabs from other areas of your body that may have an infection. These samples may be tested (cultured) to find out what type of bacteria is causing the infection.  · Chest X-ray to check for pneumonia. Other imaging tests, such as a CT scan, may also be done.  · Lumbar puncture. This removes a small amount of the fluid that surrounds your brain and spinal cord. The fluid is then examined for infection.  How is this treated?  This condition must be treated in a hospital. Based on the cause of your infection, you may be given an antibiotic, antiviral, or antifungal medicine.  You may also receive:  · Fluids through an IV.  · Oxygen and breathing assistance.  · Medicines to increase your blood  pressure.  · Kidney dialysis. This process cleans your blood if your kidneys have failed.  · Surgery to remove infected tissue.  · Blood transfusion if needed.  · Medicine to prevent blood clots.  · Nutrients to correct imbalances in basic body function (metabolism). You may:  ? Receive important salts and minerals (electrolytes) through an IV.  ? Have your blood sugar level adjusted.  Follow these instructions at home:  Medicines    · Take over-the-counter and prescription medicines only as told by your health care provider.  · If you were prescribed an antibiotic, antiviral, or antifungal medicine, take it as told by your health care provider. Do not stop taking the medicine even if you start to feel better.  General instructions  · If you have a catheter or other indwelling device, ask to have it removed as soon as possible.  · Keep all follow-up visits as told by your health care provider. This is important.  Contact a health care provider if:  · You do not feel like you are getting better or regaining strength.  · You are having trouble coping with your recovery.  · You frequently feel tired.  · You feel worse or do not seem to get better after surgery.  · You think you may have an infection after surgery.  Get help right away if:  · You have any symptoms of sepsis.  · You have difficulty breathing.  · You have a rapid or skipping heartbeat.  · You become confused or disoriented.  · You have a high fever.  · Your skin becomes blotchy, pale, or blue.  · You have an infection that is getting worse or not getting better.  These symptoms may represent a serious problem that is an emergency. Do not wait to see if the symptoms will go away. Get medical help right away. Call your local emergency services (911 in the [place] Do not drive yourself to the hospital.  Summary  · Sepsis is a medical emergency that requires immediate treatment in a hospital.  · This condition is caused by a severe reaction to infections from  bacteria, viruses, or fungus.  · Based on the cause of your infection, you may be given an antibiotic, antiviral, or antifungal medicine.  · Treatment may also include IV fluids, breathing assistance, and kidney dialysis.  This information is not intended to replace advice given to you by your health care provider. Make sure you discuss any questions you have with your health care provider.  Document Released: 09/15/2004 Document Revised: 07/26/2019 Document Reviewed: 07/26/2019  [employer] Patient Education © 2020 [employer]      · Is patient discharged on Warfarin / Coumadin?   No       Ventral Hernia    A ventral hernia is a bulge of tissue from inside the abdomen that pushes through a weak area of the muscles that form the front wall of the abdomen. The tissues inside the abdomen are inside a sac (peritoneum). These tissues include the small intestine, large intestine, and the fatty tissue that covers the intestines (omentum). Sometimes, the bulge that forms a hernia contains intestines. Other hernias contain only fat. Ventral hernias do not go away without surgical treatment.  There are several types of ventral hernias. You may have:  · A hernia at an incision site from previous abdominal surgery (incisional hernia).  · A hernia just above the belly button (epigastric hernia), or at the belly button (umbilical hernia). These types of hernias can develop from heavy lifting or straining.  · A hernia that comes and goes (reducible hernia). It may be visible only when you lift or strain. This type of hernia can be pushed back into the abdomen (reduced).  · A hernia that traps abdominal tissue inside the hernia (incarcerated hernia). This type of hernia does not reduce.  · A hernia that cuts off blood flow to the tissues inside the hernia (strangulated hernia). The tissues can start to die if this happens. This is a very painful bulge that cannot be reduced. A strangulated hernia is a medical emergency.  What are  the causes?  This condition is caused by abdominal tissue putting pressure on an area of weakness in the abdominal muscles.  What increases the risk?  The following factors may make you more likely to develop this condition:  · Being male.  · Being 60 or older.  · Being overweight or obese.  · Having had previous abdominal surgery, especially if there was an infection after surgery.  · Having had an injury to the abdominal wall.  · Having had several pregnancies.  · Having a buildup of fluid inside the abdomen (ascites).  What are the signs or symptoms?  The only symptom of a ventral hernia may be a painless bulge in the abdomen. A reducible hernia may be visible only when you strain, cough, or lift. Other symptoms may include:  · Dull pain.  · A feeling of pressure.  Signs and symptoms of a strangulated hernia may include:  · Increasing pain.  · Nausea and vomiting.  · Pain when pressing on the hernia.  · The skin over the hernia turning red or purple.  · Constipation.  · Blood in the stool (feces).  How is this diagnosed?  This condition may be diagnosed based on:  · Your symptoms.  · Your medical history.  · A physical exam. You may be asked to cough or strain while standing. These actions increase the pressure inside your abdomen and force the hernia through the opening in your muscles. Your health care provider may try to reduce the hernia by pressing on it.  · Imaging studies, such as an ultrasound or CT scan.  How is this treated?  This condition is treated with surgery. If you have a strangulated hernia, surgery is done as soon as possible. If your hernia is small and not incarcerated, you may be asked to lose some weight before surgery.  Follow these instructions at home:  · Follow instructions from your health care provider about eating or drinking restrictions.  · If you are overweight, your health care provider may recommend that you increase your activity level and eat a healthier diet.  · Do not lift  anything that is heavier than 10 lb (4.5 kg).  · Return to your normal activities as told by your health care provider. Ask your health care provider what activities are safe for you. You may need to avoid activities that increase pressure on your hernia.  · Take over-the-counter and prescription medicines only as told by your health care provider.  · Keep all follow-up visits as told by your health care provider. This is important.  Contact a health care provider if:  · Your hernia gets larger.  · Your hernia becomes painful.  Get help right away if:  · Your hernia becomes increasingly painful.  · You have pain along with any of the following:  ? Changes in skin color in the area of the hernia.  ? Nausea.  ? Vomiting.  ? Fever.  Summary  · A ventral hernia is a bulge of tissue from inside the abdomen that pushes through a weak area of the muscles that form the front wall of the abdomen.  · This condition is treated with surgery, which may be urgent depending on your hernia.  · Do not lift anything that is heavier than 10 lb (4.5 kg), and follow activity instructions from your health care provider.  This information is not intended to replace advice given to you by your health care provider. Make sure you discuss any questions you have with your health care provider.  Document Released: 12/04/2013 Document Revised: 01/30/2019 Document Reviewed: 07/09/2018  [employer] Patient Education © 2020 [employer]      Depression / Suicide Risk    As you are discharged from this [facility] facility, it is important to learn how to keep safe from harming yourself.    Recognize the warning signs:  · Abrupt changes in personality, positive or negative- including increase in energy   · Giving away possessions  · Change in eating patterns- significant weight changes-  positive or negative  · Change in sleeping patterns- unable to sleep or sleeping all the time   · Unwillingness or inability to  communicate  · Depression  · Unusual sadness, discouragement and loneliness  · Talk of wanting to die  · Neglect of personal appearance   · Rebelliousness- reckless behavior  · Withdrawal from people/activities they love  · Confusion- inability to concentrate     If you or a loved one observes any of these behaviors or has concerns about self-harm, here's what you can do:  · Talk about it- your feelings and reasons for harming yourself  · Remove any means that you might use to hurt yourself (examples: pills, rope, extension cords, firearm)  · Get professional help from the community (Mental Health, Substance Abuse, psychological counseling)  · Do not be alone:Call your Safe Contact- someone whom you trust who will be there for you.  · Call your local CRISIS HOTLINE 434-1735 or 644-332-9355  · Call your local Children's Mobile Crisis Response Team [place] (546) 729-7814 or www.[employer].com  · Call the toll free National Suicide Prevention Hotlines   · National Suicide Prevention Lifeline 858-072-PRDM (8594)  · National Hope Line Network 800-SUICIDE (988-4296)

## 2021-06-30 NOTE — CARE PLAN
Problem: Knowledge Deficit - Standard  Goal: Patient and family/care givers will demonstrate understanding of plan of care, disease process/condition, diagnostic tests and medications  Outcome: Progressing     Problem: Hemodynamics  Goal: Patient's hemodynamics, fluid balance and neurologic status will be stable or improve  Outcome: Progressing     Problem: Fluid Volume  Goal: Fluid volume balance will be maintained  Outcome: Progressing     Problem: Respiratory  Goal: Patient will achieve/maintain optimum respiratory ventilation and gas exchange  Outcome: Progressing     The patient is Stable - Low risk of patient condition declining or worsening    Shift Goals  Clinical Goals: Obtain referral to abdominal surgeon  Patient Goals: Maintain comfort    Progress made toward(s) clinical / shift goals:  Patient resting comfortably, plan of care discussed with patient, no pain, nausea, vomiting, SPO2 stable on room air    Patient is not progressing towards the following goals:

## 2021-06-30 NOTE — CARE PLAN
The patient is Stable - Low risk of patient condition declining or worsening    Shift Goals  Clinical Goals: Obtain referral to abdominal surgeon  Patient Goals: Maintain comfort    Progress made toward(s) clinical / shift goals:    Problem: Knowledge Deficit - Standard  Goal: Patient and family/care givers will demonstrate understanding of plan of care, disease process/condition, diagnostic tests and medications  Outcome: Progressing     Problem: Fluid Volume  Goal: Fluid volume balance will be maintained  Outcome: Progressing  Note: Lactic levels normal, continue IVF and antibiotics as ordered.      Problem: Respiratory  Goal: Patient will achieve/maintain optimum respiratory ventilation and gas exchange  Outcome: Progressing     Problem: Pain - Standard  Goal: Alleviation of pain or a reduction in pain to the patient’s comfort goal  Outcome: Progressing       Patient is not progressing towards the following goals:

## 2021-06-30 NOTE — PROGRESS NOTES
"    DATE: 6/29/2021    Hospital Day 2 hernia with SBO, now reduced.    Interval Events:  Seen by Dr Berrios for outpt followup, appreciate his assistance.  Abdomen soft, having bowel function    PHYSICAL EXAMINATION:  Constitutional:     Vital Signs: /73   Pulse 72   Temp 36.2 °C (97.2 °F) (Temporal)   Resp 18   Ht 1.803 m (5' 11\")   Wt 120 kg (264 lb 12.4 oz)   SpO2 97%    Abdomen- hernia is reduced, skin excoriation stable- abdomen is soft and nontender    Laboratory Values:   Recent Labs     06/27/21  1354 06/28/21  0247 06/29/21  0253   WBC 13.9* 5.4 6.3   RBC 6.83* 6.54* 5.27   HEMOGLOBIN 20.3* 19.5* 15.9   HEMATOCRIT 59.9* 57.5* 46.5   MCV 87.7 87.9 88.2   MCH 29.7 29.8 30.2   MCHC 33.9 33.9 34.2   RDW 42.4 43.8 44.9   PLATELETCT 214 160* 136*   MPV 12.2 12.2 12.3     Recent Labs     06/27/21  1354 06/28/21  0247 06/29/21  0253   SODIUM 144 139 141   POTASSIUM 3.9 3.9 3.7   CHLORIDE 104 106 109   CO2 20 15* 23   GLUCOSE 176* 180* 114*   BUN 21 36* 26*   CREATININE 1.34 2.12* 1.19   CALCIUM 10.5 8.9 7.3*     Recent Labs     06/27/21  1354 06/28/21  0247 06/29/21  0253   ASTSGOT 26 19 19   ALTSGPT 30 19 15   TBILIRUBIN 2.1* 1.0 1.1   ALKPHOSPHAT 95 65 42   GLOBULIN 3.5 2.9 2.4   INR 1.00 1.15*  --      Recent Labs     06/27/21  1354 06/28/21 0247   APTT 23.4*  --    INR 1.00 1.15*        Imaging:   DX-CHEST-PORTABLE (1 VIEW)   Final Result      Hypoinflation without other evidence for acute cardiopulmonary disease.      CT-CTA COMPLETE THORACOABDOMINAL AORTA   Final Result      1.  There is no evidence of thoracic or abdominal aortic aneurysm or dissection.   2.  There is a large ventral abdominal wall hernia containing dilated loops of small bowel and mesentery with proximal obstructed small bowel loops measuring up to 5 cm in diameter.   3.  There is no pneumatosis or free peritoneal air.   4.  There is a stable 4 mm right lower lobe lung nodule consistent with a benign postinflammatory nodule. " Stable since 2012, no further imaging is recommended.                ASSESSMENT AND PLAN:     Incarcerated ventral hernia- (present on admission)  Assessment & Plan  Improvement in acidosis and general pain.    He has had 5 major prior hernia repairs, will discuss with another surgeon for potential lap operative repair.   Dr Berrios consulting  Advancing diet as tolerated, will plan for outpatient followup for repair        DISPOSITION: Continue nonoperative management and close clinical observation. Improving clinical condition.       ____________________________________     Karey Collado M.D.    DD: 6/29/2021  6:16 PM

## 2021-07-02 ENCOUNTER — NURSE TRIAGE (OUTPATIENT)
Dept: HEALTH INFORMATION MANAGEMENT | Facility: OTHER | Age: 56
End: 2021-07-02

## 2021-07-02 NOTE — TELEPHONE ENCOUNTER
"Pt making sure he does not need to be on antibiotics post discharge. Also, pt needs referral for wound clinic even though he went to them before being admitted to the hospital. In looking through chart, no mention of antibiotics post discharge are in chart. Call transferred to  on T8 to help patient with wound clinic situation.    Reason for Disposition  • Information only question and nurse able to answer    Additional Information  • Negative: Nursing judgment  • Negative: Nursing judgment  • Negative: Nursing judgment  • Negative: Nursing judgment    Answer Assessment - Initial Assessment Questions  1. REASON FOR CALL or QUESTION: \"What is your reason for calling today?\" or \"How can I best help you?\" or \"What question do you have that I can help answer?\"      Wound clinic referral and making sure he does not need to be on antibiotics post discharge.    Protocols used: INFORMATION ONLY CALL - NO TRIAGE-A-OH      "

## 2021-07-03 LAB
BACTERIA BLD CULT: NORMAL
BACTERIA BLD CULT: NORMAL
SIGNIFICANT IND 70042: NORMAL
SIGNIFICANT IND 70042: NORMAL
SITE SITE: NORMAL
SITE SITE: NORMAL
SOURCE SOURCE: NORMAL
SOURCE SOURCE: NORMAL

## 2021-07-06 ENCOUNTER — NON-PROVIDER VISIT (OUTPATIENT)
Dept: WOUND CARE | Facility: MEDICAL CENTER | Age: 56
End: 2021-07-06
Attending: STUDENT IN AN ORGANIZED HEALTH CARE EDUCATION/TRAINING PROGRAM
Payer: COMMERCIAL

## 2021-07-06 VITALS
TEMPERATURE: 97.2 F | SYSTOLIC BLOOD PRESSURE: 141 MMHG | RESPIRATION RATE: 20 BRPM | DIASTOLIC BLOOD PRESSURE: 84 MMHG | OXYGEN SATURATION: 96 % | HEART RATE: 69 BPM

## 2021-07-06 PROCEDURE — 97602 WOUND(S) CARE NON-SELECTIVE: CPT

## 2021-07-06 PROCEDURE — 99211 OFF/OP EST MAY X REQ PHY/QHP: CPT

## 2021-07-06 ASSESSMENT — PAIN SCALES - GENERAL: PAINLEVEL: NO PAIN

## 2021-07-06 NOTE — CERTIFICATION
Non Provider Encounter- Full Thickness wound    HISTORY OF PRESENT ILLNESS  Wound History:    START OF CARE IN CLINIC: 07/06/2021    REFERRING PROVIDER: Claudia Sepulveda PA-C   WOUND- Full Thickness Wound   LOCATION: Midline Abdomen   HISTORY:   Patient is a 55 yr old male with an extensive history of Abdominal hernia repairs with complications.   Most recent surgery was around 2015 or 2016, after which he healed.  Unfortunately, he developed a small wound over the surgical site in the spring of 2020, which has progressively enlarged. He has cared for this wound with antibiotic ointment and bandage. He uses 2 abdominal binders for support.    He was seen in Wound Clinic on 6/22/21 by one of our providers  for care of current wound but a few days later felt ill, went to the ER and was admitted with Sepsis and spent several days on IV antibiotics.  He did receive a surgical consult for hernia repair.  He requested assistance with this wound. He was referred to [facility] by his primary care provider.               Of note-He has had reactions to the adhesive dressings in the past - pulling off his skin and causing rashes.    Pertinent Labs and Diagnostics:         Labs: Blood culture negative on 6/28/21    A1c:   Lab Results   Component Value Date/Time    HBA1C 5.5 06/28/2021 02:47 AM          IMAGING: Complete Thoracoabdominal Aorta CT Scan on 6/27/21    VASCULAR STUDIES: N/A    LAST  WOUND CULTURE:  DATE : N/A           FALL RISK ASSESSMENT:   65 years or older     Fall within the last 2 years   Uses ambulatory devices  Loss of protective sensation in feet   Use of prostethic/orthotic    Presence of lower extremity/foot/toe amputation   Taking medication that increases risk (per facility policy)    Interventions Recommended (if any of the above are selected):   Use of Assistive Device:   Supervision with ambulation: Caregiver   Assistance with ambulation: Caregiver   Home safety education: Educational material  provided      Patient allergies and medications reviewed via Epic.     Wound Assessment:      Wound 05/11/21 Abdomen Midline (Active)   Wound Image   07/06/21 0800   Site Assessment Red;Early/partial granulation 07/06/21 0800   Periwound Assessment Scar tissue 07/06/21 0800   Margins Attached edges 07/06/21 0800   Closure Secondary intention 07/06/21 0800   Drainage Amount Small 07/06/21 0800   Drainage Description Serosanguineous 07/06/21 0800   Treatments Cleansed;Site care 07/06/21 0800   Wound Cleansing Normal Saline Irrigation 07/06/21 0800   Periwound Protectant Barrier Paste 07/06/21 0800   Dressing Cleansing/Solutions Not Applicable 07/06/21 0800   Dressing Options Hydrofera Blue Ready;Hypafix Tape 07/06/21 0800   Dressing Changed Changed 06/15/21 0817   Dressing Status Clean;Dry;Intact 05/18/21 0815   Dressing Change/Treatment Frequency Every 72 hrs, and As Needed 06/15/21 0817   Non-staged Wound Description Full thickness 07/06/21 0800   Wound Length (cm) 1.4 cm 07/06/21 0800   Wound Width (cm) 2 cm 07/06/21 0800   Wound Depth (cm) 0.1 cm 07/06/21 0800   Wound Surface Area (cm^2) 2.8 cm^2 07/06/21 0800   Wound Volume (cm^3) 0.28 cm^3 07/06/21 0800   Post-Procedure Length (cm) 1.4 cm 07/06/21 0800   Post-Procedure Width (cm) 2 cm 07/06/21 0800   Post-Procedure Depth (cm) 0.1 cm 07/06/21 0800   Post-Procedure Surface Area (cm^2) 2.8 cm^2 07/06/21 0800   Post-Procedure Volume (cm^3) 0.28 cm^3 07/06/21 0800   Wound Healing % 87 07/06/21 0800   Wound Bed Granulation (%) 100 % 07/06/21 0800   Tunneling (cm) 0 cm 07/06/21 0800   Undermining (cm) 0 cm 07/06/21 0800   Wound Odor None 07/06/21 0800   Pulses N/A 05/11/21 0730   Exposed Structures None 07/06/21 0800          Procedures:    Non-selective debridement with Normal saline and gauze to remove biofilm from wound bed.    -Refer to flowsheet for wound care details.         PATIENT EDUCATION  -Advised to go to ER for any increased redness, swelling, drainage or  odor, or if patient develops fever, chills, nausea or vomiting.  -Importance of adequate nutrition for wound healing  -Increase protein intake (unless contraindicated by renal status)

## 2021-07-13 ENCOUNTER — NON-PROVIDER VISIT (OUTPATIENT)
Dept: WOUND CARE | Facility: MEDICAL CENTER | Age: 56
End: 2021-07-13
Attending: STUDENT IN AN ORGANIZED HEALTH CARE EDUCATION/TRAINING PROGRAM
Payer: COMMERCIAL

## 2021-07-13 PROCEDURE — 97602 WOUND(S) CARE NON-SELECTIVE: CPT

## 2021-07-13 NOTE — PATIENT INSTRUCTIONS
-Keep your wound dressing clean, dry, and intact.    -Change your dressing every 3 to 4 days or if it becomes soiled, soaked, or falls off.    -Should you experience any significant changes in your wound(s), such as infection (redness, swelling, localized heat, increased pain, fever > 101 F, chills) or have any questions regarding your home care instructions, please contact the wound center at [phone]. If after hours, contact your primary care physician or go to the hospital emergency room.

## 2021-07-20 ENCOUNTER — OFFICE VISIT (OUTPATIENT)
Dept: WOUND CARE | Facility: MEDICAL CENTER | Age: 56
End: 2021-07-20
Attending: STUDENT IN AN ORGANIZED HEALTH CARE EDUCATION/TRAINING PROGRAM
Payer: COMMERCIAL

## 2021-07-20 VITALS
TEMPERATURE: 97.6 F | HEART RATE: 65 BPM | DIASTOLIC BLOOD PRESSURE: 90 MMHG | RESPIRATION RATE: 18 BRPM | OXYGEN SATURATION: 96 % | SYSTOLIC BLOOD PRESSURE: 156 MMHG

## 2021-07-20 DIAGNOSIS — T14.8XXA WOUND INFECTION: ICD-10-CM

## 2021-07-20 DIAGNOSIS — L08.9 WOUND INFECTION: ICD-10-CM

## 2021-07-20 DIAGNOSIS — E66.01 CLASS 2 SEVERE OBESITY DUE TO EXCESS CALORIES WITH SERIOUS COMORBIDITY IN ADULT, UNSPECIFIED BMI (HCC): ICD-10-CM

## 2021-07-20 DIAGNOSIS — Z87.19 HISTORY OF HERNIA REPAIR: ICD-10-CM

## 2021-07-20 DIAGNOSIS — Z98.890 HISTORY OF HERNIA REPAIR: ICD-10-CM

## 2021-07-20 DIAGNOSIS — S31.109D OPEN WOUND OF ABDOMEN, SUBSEQUENT ENCOUNTER: ICD-10-CM

## 2021-07-20 PROCEDURE — 11042 DBRDMT SUBQ TIS 1ST 20SQCM/<: CPT | Performed by: NURSE PRACTITIONER

## 2021-07-20 PROCEDURE — 11042 DBRDMT SUBQ TIS 1ST 20SQCM/<: CPT

## 2021-07-20 PROCEDURE — 99213 OFFICE O/P EST LOW 20 MIN: CPT | Mod: 25 | Performed by: NURSE PRACTITIONER

## 2021-07-20 PROCEDURE — 99213 OFFICE O/P EST LOW 20 MIN: CPT

## 2021-07-20 ASSESSMENT — ENCOUNTER SYMPTOMS
CHILLS: 0
DEPRESSION: 0
WEIGHT LOSS: 1
BACK PAIN: 0
VOMITING: 0
DIZZINESS: 0
DIARRHEA: 0
PALPITATIONS: 0
NAUSEA: 0
CONSTIPATION: 0
FEVER: 0
SHORTNESS OF BREATH: 0
COUGH: 0

## 2021-07-20 NOTE — PROGRESS NOTES
Provider Encounter- Full Thickness wound    HISTORY OF PRESENT ILLNESS  Wound History:    START OF CARE IN CLINIC: 5/18/2021    REFERRING PROVIDER: Claudia Sepulveda P.A.-C.     WOUND- Full Thickness Wound   LOCATION: Mid abdomen   HISTORY: Patient with history of multiple hernia repairs with postoperative complications.  Most recent surgery was around 2015 or 2016, after which he healed.  Unfortunately, he developed a small wound over the surgical site in the spring of 2020, which has progressively enlarged.  He was referred to another surgeon, and was told another surgery was not an option. He was caring for the wound himself due to lack of insurance. He is applying antibiotic ointment and nonstick pads. He also uses 2 abdominal binders. Once he obtained insurance coverage again, he requested assistance with this wound. He was referred to [facility] by his primary care provider.    Of note-He has had reactions to the adhesive dressings in the past - pulling off his skin and causing rashes.    Pertinent Medical History: Obesity, multiple hernia repairs, polycythemia    TOBACCO USE: He has never smoked or use smokeless tobacco    Patient's problem list, allergies, and current medications reviewed and updated in Epic    Interval History:  5/18/2021 Initial clinic visit with HEBER Merrill, FNP-BC, CWRONYN, CFCN. Billy states he is feeling well overall, denies fevers, chills, nausea, vomiting, cough or shortness of breath. He expresses some concern about not doing dressing changes 1-2 times per day as he was previously. Advance wound products and appropriate use as discussed, as well as the rationale to less frequent dressing changes. He informed me that his wounds seem to be doing better when he was on antibiotics for something else. Culture collected in clinic today.    5/26/2021: Clinic visit with HEBER Montes. Patient states that they are feeling well today.  Patient denies fever, chills, nausea, vomiting,  lightheadedness, dizziness, shortness of breath and chest pain.  Silver nitrate used to the superior aspect of the wound to take down hypergranulation tissue.  Dressing changed to Hydrofera Blue classic to reduce hyper granulation and to treat right staff.  Mupirocin ointment placed to periwound area to treat slight folliculitis.      6/1/2021 : Clinic visit with HEBER Merrill, EDWARD, ANGELITA, BARNEY.  Billy states he is feeling well.  He is satisfied with progression of his wound.  We discussed alternative hernia belt options, I referred him to [employer] website.    6/15/2021: Clinic visit with HEBER Montes. Patient states that they are feeling well today.  Patient denies fever, chills, nausea, vomiting, lightheadedness, dizziness, shortness of breath and chest pain. Epibole edges to the inferior aspect of the wound taken down in clinic today.  Surface of the wound debrided to stimulate granulation tissue formation continue progression the wound bed.    6/22/21: Clinic visit with HEBER Leonard FNP-BC. Patient reports feeling well.  Overall he is happy with the wound progress.  He does report to me today that the wound started due to some rubbing on his skin from his hernia belt.  He states initially it was a small area but then he applied a Band-Aid and when he remove the Band-Aid there was subsequent skin tear.  Denies wound drainage, odor. Denies erythema, swelling, pain.  He has been seen by multiple surgeons in the past and has been told hernia surgery is not an option.  He is discussing today that his primary care provider has discussed sending him to a specialist at [facility].  He does wear a hernia belt consistently    7/20/2021 : Clinic visit with HEBER Merrill, EDWARD, ANGELITA, BARNEY.  Diony states he is feeling well.  Informed today that he plans to undergo surgery in November with Dr. Berrios, for repair of his hernia.  He was apparently seen by Dr. Berrios during his hospitalization, who  assured him he would be able to help him.  He continues to wear his hernia belt consistently.  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    REVIEW OF SYSTEMS:   Review of Systems   Constitutional: Positive for weight loss. Negative for chills and fever.        More than 30 pound weight loss over the past 6 months, intentional   Respiratory: Negative for cough and shortness of breath.    Cardiovascular: Negative for chest pain, palpitations and leg swelling.   Gastrointestinal: Negative for constipation, diarrhea, nausea and vomiting.   Genitourinary: Negative for dysuria.   Musculoskeletal: Negative for back pain and joint pain.   Skin: Negative for rash.   Neurological: Negative for dizziness.   Psychiatric/Behavioral: Negative for depression.       PHYSICAL EXAMINATION:   /90   Pulse 65   Temp 36.4 °C (97.6 °F) (Temporal)   Resp 18   SpO2 96%     Physical Exam  Constitutional:       Appearance: Normal appearance.   HENT:      Head: Normocephalic.      Comments: Hard of hearing  Eyes:      Pupils: Pupils are equal, round, and reactive to light.   Cardiovascular:      Rate and Rhythm: Normal rate.   Pulmonary:      Effort: Pulmonary effort is normal.   Abdominal:      Palpations: Abdomen is soft.      Tenderness: There is no guarding or rebound.      Comments: Prominent ventral hernia   Musculoskeletal:         General: Normal range of motion.   Skin:     General: Skin is warm.      Comments: Full-thickness wound to lower abdomen, over ventral hernia   Neurological:      Mental Status: He is alert and oriented to person, place, and time.   Psychiatric:         Mood and Affect: Mood normal.         Behavior: Behavior normal.         Thought Content: Thought content normal.         Judgment: Judgment normal.         WOUND ASSESSMENT    Wound 05/11/21 Abdomen Midline (Active)   Wound Image    07/20/21 0808   Site Assessment Red;Fragile 07/20/21 0808   Periwound Assessment Scar tissue 07/20/21 0808   Margins  Attached edges 07/20/21 0808   Closure Secondary intention 07/06/21 0800   Drainage Amount Small 07/20/21 0808   Drainage Description Serosanguineous 07/20/21 0808   Treatments Cleansed;Topical Lidocaine;Provider debridement 07/20/21 0808   Wound Cleansing Normal Saline Irrigation 07/20/21 0808   Periwound Protectant Skin Protectant Wipes to Periwound;Barrier Paste 07/20/21 0808   Dressing Cleansing/Solutions Normal Saline 07/20/21 0808   Dressing Options Hydrofera Blue Classic;Silicone Adhesive Foam 07/20/21 0808   Dressing Changed Changed 06/15/21 0817   Dressing Status Clean;Dry;Intact 05/18/21 0815   Dressing Change/Treatment Frequency Every 72 hrs, and As Needed 06/15/21 0817   Non-staged Wound Description Full thickness 07/20/21 0808   Wound Length (cm) 1.2 cm 07/20/21 0808   Wound Width (cm) 2 cm 07/20/21 0808   Wound Depth (cm) 0.1 cm 07/20/21 0808   Wound Surface Area (cm^2) 2.4 cm^2 07/20/21 0808   Wound Volume (cm^3) 0.24 cm^3 07/20/21 0808   Post-Procedure Length (cm) 1.2 cm 07/20/21 0808   Post-Procedure Width (cm) 2.1 cm 07/20/21 0808   Post-Procedure Depth (cm) 0.1 cm 07/20/21 0808   Post-Procedure Surface Area (cm^2) 2.52 cm^2 07/20/21 0808   Post-Procedure Volume (cm^3) 0.25 cm^3 07/20/21 0808   Wound Healing % 89 07/20/21 0808   Wound Bed Granulation (%) 100 % 07/06/21 0800   Tunneling (cm) 0 cm 07/20/21 0808   Undermining (cm) 0 cm 07/20/21 0808   Wound Odor None 07/20/21 0808   Pulses N/A 05/11/21 0730   Exposed Structures None 07/20/21 0808           PROCEDURE:   -2% viscous lidocaine applied topically to wound bed for approximately 5 minutes prior to debridement  -Curette used to debride wound bed.  Excisional debridement was performed to remove devitalized tissue until healthy, bleeding tissue was visualized.   Entire surface of wound, 2.52 cm2 debrided.  Tissue debrided into the subcutaneous layer.   -Bleeding controlled with manual pressure.    -Wound care completed by wound RN, refer to  flowsheet  -Patient tolerated the procedure well, without c/o pain or discomfort.     Pertinent Labs and Diagnostics:    Labs:     A1c:   Lab Results   Component Value Date/Time    HBA1C 5.5 06/28/2021 02:47 AM          IMAGING: None found in epic    VASCULAR STUDIES: N/A    LAST  WOUND CULTURE:  DATE : 5/18/2021-culture collected in clinic      ASSESSMENT AND PLAN:   1. Open wound of abdomen, subsequent encounter  Comments: Spontaneous opening of wound over hernia, started as small wound which progressively enlarged.    7/20/2021: Wound area continues to decrease steadily.  -Excisional debridement of wound in clinic today, medically necessary to promote wound healing.  -Patient to return to clinic weekly for assessment and debridement  -Patient to change dressing 1-2 times per week in between clinic visits    Wound care: Hydrofera Blue classic, silicone adhesive foam    2. Wound infection    7/20/2021: No signs or symptoms of infection today  -Continue to monitor for signs symptoms of infection at each additional clinic visit    3. History of hernia repair  Comments: Per patient, 5 surgical hernia repairs, all of which have failed.  He is apparently not currently a candidate for another surgery, however does mention today that he plans undergo surgery with Dr. Berrios in November.    4. Class 2 severe obesity due to excess calories with serious comorbidity in adult, unspecified BMI (HCC)    7/20/2021: Patient states he has an intentional weight loss of 30 pounds in the last 6 months.  He plans to continue his efforts to lose more weight.  He is aware of how his weight contributes to his abdominal hernia    PATIENT EDUCATION  - Importance of adequate nutrition for wound healing  -Advised to go to ER for any increased redness, swelling, drainage, or odor, or if patient develops fever, chills, nausea or vomiting.       My total time spent caring for the patient on the day of the encounter was 20 minutes.   This does  not include time spent on separately billable procedures/tests.        Please note that this note may have been created using voice recognition software. I have worked with technical experts from [facility] to optimize the interface.  I have made every reasonable attempt to correct obvious errors, but there may be errors of grammar and possibly content that I did not discover before finalizing the note.    N

## 2021-07-20 NOTE — PATIENT INSTRUCTIONS
-Keep dressings clean and dry. Change dressings if they become over saturated, soiled or fall off.     -Should you experience any significant changes in your wound(s), such as signs of infection (redness, swelling, localized heat, increased pain, fever > 101 F, chills) or have any questions regarding your home care instructions, please contact the wound center at [phone]. If after hours, contact your primary care physician or go to the hospital emergency room.     -If you are 5 or more minutes late for an appointment, we reserve the right to cancel and reschedule that appointment. Additionally, if you are habitually late or not showing (3 late cancellations and/or no shows), we reserve the right to cancel your remaining appointments and it will be your responsibility to obtain a new referral if services are still needed.

## 2021-07-27 ENCOUNTER — NON-PROVIDER VISIT (OUTPATIENT)
Dept: WOUND CARE | Facility: MEDICAL CENTER | Age: 56
End: 2021-07-27
Attending: STUDENT IN AN ORGANIZED HEALTH CARE EDUCATION/TRAINING PROGRAM
Payer: COMMERCIAL

## 2021-07-27 PROCEDURE — 97602 WOUND(S) CARE NON-SELECTIVE: CPT

## 2021-07-27 NOTE — PATIENT INSTRUCTIONS
-Keep your wound dressing clean, dry, and intact.    -Change your dressing every 3 to 4 days or if it becomes soiled, soaked, or falls off.    -Should you experience any significant changes in your wound(s), such as infection (redness, swelling, localized heat, increased pain, fever > 101 F, chills) or have any questions regarding your home care instructions, please contact the wound center at [phone]. If after hours, contact your primary care physician or go to the hospital emergency room.

## 2021-08-03 ENCOUNTER — OFFICE VISIT (OUTPATIENT)
Dept: WOUND CARE | Facility: MEDICAL CENTER | Age: 56
End: 2021-08-03
Attending: STUDENT IN AN ORGANIZED HEALTH CARE EDUCATION/TRAINING PROGRAM
Payer: COMMERCIAL

## 2021-08-03 VITALS
HEART RATE: 78 BPM | DIASTOLIC BLOOD PRESSURE: 78 MMHG | TEMPERATURE: 97.5 F | OXYGEN SATURATION: 95 % | RESPIRATION RATE: 18 BRPM | SYSTOLIC BLOOD PRESSURE: 126 MMHG

## 2021-08-03 DIAGNOSIS — L08.9 WOUND INFECTION: ICD-10-CM

## 2021-08-03 DIAGNOSIS — E66.01 CLASS 2 SEVERE OBESITY DUE TO EXCESS CALORIES WITH SERIOUS COMORBIDITY IN ADULT, UNSPECIFIED BMI (HCC): ICD-10-CM

## 2021-08-03 DIAGNOSIS — T14.8XXA WOUND INFECTION: ICD-10-CM

## 2021-08-03 DIAGNOSIS — Z98.890 HISTORY OF HERNIA REPAIR: ICD-10-CM

## 2021-08-03 DIAGNOSIS — Z87.19 HISTORY OF HERNIA REPAIR: ICD-10-CM

## 2021-08-03 DIAGNOSIS — S31.109D OPEN WOUND OF ABDOMEN, SUBSEQUENT ENCOUNTER: Primary | ICD-10-CM

## 2021-08-03 PROCEDURE — 99213 OFFICE O/P EST LOW 20 MIN: CPT | Performed by: NURSE PRACTITIONER

## 2021-08-03 PROCEDURE — 99213 OFFICE O/P EST LOW 20 MIN: CPT

## 2021-08-03 ASSESSMENT — ENCOUNTER SYMPTOMS
SHORTNESS OF BREATH: 0
COUGH: 0
NAUSEA: 0
DIARRHEA: 0
PALPITATIONS: 0
VOMITING: 0
BACK PAIN: 0
DIZZINESS: 0
FEVER: 0
CONSTIPATION: 0
WEIGHT LOSS: 1
DEPRESSION: 0
CHILLS: 0

## 2021-08-03 ASSESSMENT — PAIN SCALES - GENERAL: PAINLEVEL: NO PAIN

## 2021-08-03 NOTE — PATIENT INSTRUCTIONS
Apply moistened collagen dressing (Arleen) to wound bed as directed by provider. Cover wound with foam dressing and change dressing every 3~4 days.    Should you experience any significant changes in your wound(s), such as infection (redness, swelling, localized heat, increased pain, fever > 101 F, chills) or have any questions regarding your home care instructions, please contact the wound center at [phone]. If after hours, contact your primary care physician or go to the hospital emergency room.   Keep dressing clean, dry and covered while bathing. Only change dressing if it becomes over saturated, soiled or falls off.

## 2021-08-03 NOTE — PROGRESS NOTES
Provider Encounter- Full Thickness wound    HISTORY OF PRESENT ILLNESS  Wound History:    START OF CARE IN CLINIC: 5/18/2021    REFERRING PROVIDER: Claudia Sepulveda P.A.-C.     WOUND- Full Thickness Wound   LOCATION: Mid abdomen   HISTORY: Patient with history of multiple hernia repairs with postoperative complications.  Most recent surgery was around 2015 or 2016, after which he healed.  Unfortunately, he developed a small wound over the surgical site in the spring of 2020, which has progressively enlarged.  He was referred to another surgeon, and was told another surgery was not an option. He was caring for the wound himself due to lack of insurance. He is applying antibiotic ointment and nonstick pads. He also uses 2 abdominal binders. Once he obtained insurance coverage again, he requested assistance with this wound. He was referred to [facility] by his primary care provider.    Of note-He has had reactions to the adhesive dressings in the past - pulling off his skin and causing rashes.    Pertinent Medical History: Obesity, multiple hernia repairs, polycythemia    TOBACCO USE: He has never smoked or use smokeless tobacco    Patient's problem list, allergies, and current medications reviewed and updated in Epic    Interval History:  5/18/2021 Initial clinic visit with HEBER Merrill, FNP-BC, CWRONYN, CFCN. Billy states he is feeling well overall, denies fevers, chills, nausea, vomiting, cough or shortness of breath. He expresses some concern about not doing dressing changes 1-2 times per day as he was previously. Advance wound products and appropriate use as discussed, as well as the rationale to less frequent dressing changes. He informed me that his wounds seem to be doing better when he was on antibiotics for something else. Culture collected in clinic today.    5/26/2021: Clinic visit with HEBER Montes. Patient states that they are feeling well today.  Patient denies fever, chills, nausea, vomiting,  lightheadedness, dizziness, shortness of breath and chest pain.  Silver nitrate used to the superior aspect of the wound to take down hypergranulation tissue.  Dressing changed to Hydrofera Blue classic to reduce hyper granulation and to treat right staff.  Mupirocin ointment placed to periwound area to treat slight folliculitis.      6/1/2021 : Clinic visit with HEBER Merrill, EDWARD, ANGELITA, BARNEY.  Billy states he is feeling well.  He is satisfied with progression of his wound.  We discussed alternative hernia belt options, I referred him to [employer] website.    6/15/2021: Clinic visit with HEBER Montes. Patient states that they are feeling well today.  Patient denies fever, chills, nausea, vomiting, lightheadedness, dizziness, shortness of breath and chest pain. Epibole edges to the inferior aspect of the wound taken down in clinic today.  Surface of the wound debrided to stimulate granulation tissue formation continue progression the wound bed.    6/22/21: Clinic visit with HEBER Leonard FNP-BC. Patient reports feeling well.  Overall he is happy with the wound progress.  He does report to me today that the wound started due to some rubbing on his skin from his hernia belt.  He states initially it was a small area but then he applied a Band-Aid and when he remove the Band-Aid there was subsequent skin tear.  Denies wound drainage, odor. Denies erythema, swelling, pain.  He has been seen by multiple surgeons in the past and has been told hernia surgery is not an option.  He is discussing today that his primary care provider has discussed sending him to a specialist at [facility].  He does wear a hernia belt consistently    7/20/2021 : Clinic visit with HEBER Merrill, EDWARD, ANGELITA, BARNEY.  Diony states he is feeling well.  Informed today that he plans to undergo surgery in November with Dr. Berrios, for repair of his hernia.  He was apparently seen by Dr. Berrios during his hospitalization, who  assured him he would be able to help him.  He continues to wear his hernia belt consistently.  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    8/3/2021: Clinic visit with HEBER Montes. Patient states that they are feeling well today.  Patient denies fever, chills, nausea, vomiting, lightheadedness, dizziness, shortness of breath and chest pain.  Abdominal wound is approximately the same size is superficial in nature no need for debridement today in clinic.  Patient's dressing appeared to be near the edge of the wound this could be contributing to nonhealing as well as the patient's 2 abdominal binders for his hernia.  Patient instructed on proper dressing placement to avoid wound irritation and prohibit wound healing.  Collagen placed to promote granulation tissue formation and support epithelialization of the wound.    REVIEW OF SYSTEMS:   Review of Systems   Constitutional: Positive for weight loss. Negative for chills and fever.        More than 30 pound weight loss over the past 6 months, intentional   Respiratory: Negative for cough and shortness of breath.    Cardiovascular: Negative for chest pain, palpitations and leg swelling.   Gastrointestinal: Negative for constipation, diarrhea, nausea and vomiting.   Genitourinary: Negative for dysuria.   Musculoskeletal: Negative for back pain and joint pain.   Skin: Negative for rash.   Neurological: Negative for dizziness.   Psychiatric/Behavioral: Negative for depression.       PHYSICAL EXAMINATION:   /78 (BP Location: Right arm, Patient Position: Sitting, BP Cuff Size: Adult)   Pulse 78   Temp 36.4 °C (97.5 °F) (Temporal)   Resp 18   SpO2 95%     Physical Exam  Constitutional:       Appearance: Normal appearance.   HENT:      Head: Normocephalic.      Comments: Hard of hearing  Eyes:      Pupils: Pupils are equal, round, and reactive to light.   Cardiovascular:      Rate and Rhythm: Normal rate.   Pulmonary:      Effort: Pulmonary effort is normal.    Abdominal:      Palpations: Abdomen is soft.      Tenderness: There is no guarding or rebound.      Comments: Prominent ventral hernia   Musculoskeletal:         General: Normal range of motion.   Skin:     General: Skin is warm.      Comments: Full-thickness wound to lower abdomen, over ventral hernia   Neurological:      Mental Status: He is alert and oriented to person, place, and time.   Psychiatric:         Mood and Affect: Mood normal.         Behavior: Behavior normal.         Thought Content: Thought content normal.         Judgment: Judgment normal.         WOUND ASSESSMENT       Wound 05/11/21 Abdomen Midline (Active)   Wound Image   08/03/21 0820   Site Assessment Red;Fragile 08/03/21 0820   Periwound Assessment Scar tissue 08/03/21 0820   Margins Attached edges 08/03/21 0820   Closure Secondary intention 07/06/21 0800   Drainage Amount Small 08/03/21 0820   Drainage Description Serosanguineous 08/03/21 0820   Treatments Cleansed;Site care 08/03/21 0820   Wound Cleansing Puracyn [place] 08/03/21 0820   Periwound Protectant Skin Protectant Wipes to Periwound;Barrier Paste 08/03/21 0820   Dressing Cleansing/Solutions Other (Comments) 08/03/21 0820   Dressing Options Collagen Dressing;Silicone Adhesive Foam 08/03/21 0820   Dressing Changed Changed 06/15/21 0817   Dressing Status Clean;Dry;Intact 05/18/21 0815   Dressing Change/Treatment Frequency Every 72 hrs, and As Needed 06/15/21 0817   Non-staged Wound Description Full thickness 08/03/21 0820   Wound Length (cm) 0.4 cm 08/03/21 0820   Wound Width (cm) 0.6 cm 08/03/21 0820   Wound Depth (cm) 0.1 cm 07/20/21 0808   Wound Surface Area (cm^2) 0.24 cm^2 08/03/21 0820   Wound Volume (cm^3) 0.24 cm^3 07/20/21 0808   Post-Procedure Length (cm) 1.2 cm 07/20/21 0808   Post-Procedure Width (cm) 2.1 cm 07/20/21 0808   Post-Procedure Depth (cm) 0.1 cm 07/20/21 0808   Post-Procedure Surface Area (cm^2) 2.52 cm^2 07/20/21 0808   Post-Procedure Volume (cm^3) 0.25 cm^3  07/20/21 0808   Wound Healing % 89 07/20/21 0808   Wound Bed Granulation (%) 100 % 08/03/21 0820   Tunneling (cm) 0 cm 08/03/21 0820   Undermining (cm) 0 cm 08/03/21 0820   Wound Odor None 08/03/21 0820   Pulses N/A 05/11/21 0730   Exposed Structures None 08/03/21 0820                PROCEDURE:   Debridement not necessary in clinic today.  See interval history    Pertinent Labs and Diagnostics:    Labs:     A1c:   Lab Results   Component Value Date/Time    HBA1C 5.5 06/28/2021 02:47 AM          IMAGING: None found in epic    VASCULAR STUDIES: N/A    LAST  WOUND CULTURE:  DATE : 5/18/2021-culture collected in clinic      ASSESSMENT AND PLAN:   1. Open wound of abdomen, subsequent encounter  Comments: Spontaneous opening of wound over hernia, started as small wound which progressively enlarged.    08/03/21: Wound approximately same size in clinic today.  -Excisional debridement of wound in clinic not necessary today.  -Patient to return to clinic weekly for assessment and debridement  -Patient to change dressing 1-2 times per week in between clinic visits    Wound care: Collagen dressing, silicone adhesive foam, silicone adhesive foam    2. Wound infection    08/03/21: No signs or symptoms of infection today  -Continue to monitor for signs symptoms of infection at each additional clinic visit    3. History of hernia repair  Comments: Per patient, 5 surgical hernia repairs, all of which have failed.  He is apparently not currently a candidate for another surgery, however does mention today that he plans undergo surgery with Dr. Berrios in November.    4. Class 2 severe obesity due to excess calories with serious comorbidity in adult, unspecified BMI (HCC)    08/03/21: Patient states he has an intentional weight loss of 30 pounds in the last 6 months.  He plans to continue his efforts to lose more weight.  He is aware of how his weight contributes to his abdominal hernia    PATIENT EDUCATION  - Importance of adequate  nutrition for wound healing  -Advised to go to ER for any increased redness, swelling, drainage, or odor, or if patient develops fever, chills, nausea or vomiting.       >20  min spent face to face with patient, >50% of time spent counseling, coordinating care, reviewing records, discussing POC, educating patient          Please note that this note may have been created using voice recognition software. I have worked with technical experts from [facility] to optimize the interface.  I have made every reasonable attempt to correct obvious errors, but there may be errors of grammar and possibly content that I did not discover before finalizing the note.    N

## 2021-08-10 ENCOUNTER — OFFICE VISIT (OUTPATIENT)
Dept: WOUND CARE | Facility: MEDICAL CENTER | Age: 56
End: 2021-08-10
Attending: STUDENT IN AN ORGANIZED HEALTH CARE EDUCATION/TRAINING PROGRAM
Payer: COMMERCIAL

## 2021-08-10 VITALS
TEMPERATURE: 97.9 F | DIASTOLIC BLOOD PRESSURE: 90 MMHG | HEART RATE: 84 BPM | OXYGEN SATURATION: 94 % | SYSTOLIC BLOOD PRESSURE: 127 MMHG | RESPIRATION RATE: 19 BRPM

## 2021-08-10 DIAGNOSIS — Z98.890 HISTORY OF HERNIA REPAIR: ICD-10-CM

## 2021-08-10 DIAGNOSIS — L08.9 WOUND INFECTION: ICD-10-CM

## 2021-08-10 DIAGNOSIS — Z87.19 HISTORY OF HERNIA REPAIR: ICD-10-CM

## 2021-08-10 DIAGNOSIS — S31.109D OPEN WOUND OF ABDOMEN, SUBSEQUENT ENCOUNTER: Primary | ICD-10-CM

## 2021-08-10 DIAGNOSIS — E66.01 CLASS 2 SEVERE OBESITY DUE TO EXCESS CALORIES WITH SERIOUS COMORBIDITY IN ADULT, UNSPECIFIED BMI (HCC): ICD-10-CM

## 2021-08-10 DIAGNOSIS — T14.8XXA WOUND INFECTION: ICD-10-CM

## 2021-08-10 PROCEDURE — 99212 OFFICE O/P EST SF 10 MIN: CPT

## 2021-08-10 PROCEDURE — 99212 OFFICE O/P EST SF 10 MIN: CPT | Performed by: NURSE PRACTITIONER

## 2021-08-10 ASSESSMENT — ENCOUNTER SYMPTOMS
BACK PAIN: 0
VOMITING: 0
DEPRESSION: 0
WEIGHT LOSS: 1
CHILLS: 0
DIARRHEA: 0
DIZZINESS: 0
FEVER: 0
NAUSEA: 0
PALPITATIONS: 0
COUGH: 0
CONSTIPATION: 0
SHORTNESS OF BREATH: 0

## 2021-08-10 NOTE — PROGRESS NOTES
Provider Encounter- Full Thickness wound    HISTORY OF PRESENT ILLNESS  Wound History:    START OF CARE IN CLINIC: 5/18/2021    REFERRING PROVIDER: Claudia Sepulveda P.A.-C.     WOUND- Full Thickness Wound   LOCATION: Mid abdomen   HISTORY: Patient with history of multiple hernia repairs with postoperative complications.  Most recent surgery was around 2015 or 2016, after which he healed.  Unfortunately, he developed a small wound over the surgical site in the spring of 2020, which has progressively enlarged.  He was referred to another surgeon, and was told another surgery was not an option. He was caring for the wound himself due to lack of insurance. He is applying antibiotic ointment and nonstick pads. He also uses 2 abdominal binders. Once he obtained insurance coverage again, he requested assistance with this wound. He was referred to [facility] by his primary care provider.    Of note-He has had reactions to the adhesive dressings in the past - pulling off his skin and causing rashes.    Pertinent Medical History: Obesity, multiple hernia repairs, polycythemia    TOBACCO USE: He has never smoked or use smokeless tobacco    Patient's problem list, allergies, and current medications reviewed and updated in Epic    Interval History:  5/18/2021 Initial clinic visit with HEBER Merrill, FNP-BC, CWRONYN, CFCN. Billy states he is feeling well overall, denies fevers, chills, nausea, vomiting, cough or shortness of breath. He expresses some concern about not doing dressing changes 1-2 times per day as he was previously. Advance wound products and appropriate use as discussed, as well as the rationale to less frequent dressing changes. He informed me that his wounds seem to be doing better when he was on antibiotics for something else. Culture collected in clinic today.    5/26/2021: Clinic visit with HEBER Montes. Patient states that they are feeling well today.  Patient denies fever, chills, nausea, vomiting,  lightheadedness, dizziness, shortness of breath and chest pain.  Silver nitrate used to the superior aspect of the wound to take down hypergranulation tissue.  Dressing changed to Hydrofera Blue classic to reduce hyper granulation and to treat right staff.  Mupirocin ointment placed to periwound area to treat slight folliculitis.      6/1/2021 : Clinic visit with HEBER Merrill, EDWARD, ANGELITA, BARNEY.  Billy states he is feeling well.  He is satisfied with progression of his wound.  We discussed alternative hernia belt options, I referred him to [employer] website.    6/15/2021: Clinic visit with HEBER Montes. Patient states that they are feeling well today.  Patient denies fever, chills, nausea, vomiting, lightheadedness, dizziness, shortness of breath and chest pain. Epibole edges to the inferior aspect of the wound taken down in clinic today.  Surface of the wound debrided to stimulate granulation tissue formation continue progression the wound bed.    6/22/21: Clinic visit with HEBER Leonard FNP-BC. Patient reports feeling well.  Overall he is happy with the wound progress.  He does report to me today that the wound started due to some rubbing on his skin from his hernia belt.  He states initially it was a small area but then he applied a Band-Aid and when he remove the Band-Aid there was subsequent skin tear.  Denies wound drainage, odor. Denies erythema, swelling, pain.  He has been seen by multiple surgeons in the past and has been told hernia surgery is not an option.  He is discussing today that his primary care provider has discussed sending him to a specialist at [facility].  He does wear a hernia belt consistently    7/20/2021 : Clinic visit with HEBER Merrill, EDWARD, ANGELITA, BARNEY.  Diony states he is feeling well.  Informed today that he plans to undergo surgery in November with Dr. Berrios, for repair of his hernia.  He was apparently seen by Dr. Berrios during his hospitalization, who  assured him he would be able to help him.  He continues to wear his hernia belt consistently.  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    8/3/2021: Clinic visit with HEBER Montes. Patient states that they are feeling well today.  Patient denies fever, chills, nausea, vomiting, lightheadedness, dizziness, shortness of breath and chest pain.  Abdominal wound is approximately the same size is superficial in nature no need for debridement today in clinic.  Patient's dressing appeared to be near the edge of the wound this could be contributing to nonhealing as well as the patient's 2 abdominal binders for his hernia.  Patient instructed on proper dressing placement to avoid wound irritation and prohibit wound healing.  Collagen placed to promote granulation tissue formation and support epithelialization of the wound.    8/10/2021: Clinic visit with HEBER Montes. Patient states that they are feeling well today.  Patient denies fever, chills, nausea, vomiting, lightheadedness, dizziness, shortness of breath and chest pain.  Patient's wound is improving with epithelial tissue to the periwound area wound is significantly smaller will have patient come back in 2 weeks unless wound has resolved.      REVIEW OF SYSTEMS:   Review of Systems   Constitutional: Positive for weight loss. Negative for chills and fever.        More than 30 pound weight loss over the past 6 months, intentional   Respiratory: Negative for cough and shortness of breath.    Cardiovascular: Negative for chest pain, palpitations and leg swelling.   Gastrointestinal: Negative for constipation, diarrhea, nausea and vomiting.   Genitourinary: Negative for dysuria.   Musculoskeletal: Negative for back pain and joint pain.   Skin: Negative for rash.   Neurological: Negative for dizziness.   Psychiatric/Behavioral: Negative for depression.       PHYSICAL EXAMINATION:   /90 (BP Location: Left arm, Patient Position: Sitting)   Pulse 84    Temp 36.6 °C (97.9 °F) (Temporal)   Resp 19   SpO2 94%     Physical Exam  Constitutional:       Appearance: Normal appearance.   HENT:      Head: Normocephalic.      Comments: Hard of hearing  Eyes:      Pupils: Pupils are equal, round, and reactive to light.   Cardiovascular:      Rate and Rhythm: Normal rate.   Pulmonary:      Effort: Pulmonary effort is normal.   Abdominal:      Palpations: Abdomen is soft.      Tenderness: There is no guarding or rebound.      Comments: Prominent ventral hernia   Musculoskeletal:         General: Normal range of motion.   Skin:     General: Skin is warm.      Comments: Full-thickness wound to lower abdomen, over ventral hernia   Neurological:      Mental Status: He is alert and oriented to person, place, and time.   Psychiatric:         Mood and Affect: Mood normal.         Behavior: Behavior normal.         Thought Content: Thought content normal.         Judgment: Judgment normal.         WOUND ASSESSMENT          Wound 05/11/21 Abdomen Midline (Active)   Wound Image   08/10/21 0856   Site Assessment Fragile;Epithelialization 08/10/21 0856   Periwound Assessment Scar tissue 08/10/21 0856   Margins Attached edges 08/10/21 0856   Closure Secondary intention 07/06/21 0800   Drainage Amount Small 08/10/21 0856   Drainage Description Serosanguineous 08/10/21 0856   Treatments Cleansed;Site care 08/10/21 0856   Wound Cleansing Puracyn [place] 08/10/21 0856   Periwound Protectant Skin Protectant Wipes to Periwound;Barrier Paste 08/10/21 0856   Dressing Cleansing/Solutions Not Applicable 08/10/21 0856   Dressing Options Hydrofera Blue Ready;Silicone Adhesive Foam 08/10/21 0856   Dressing Changed Changed 06/15/21 0817   Dressing Status Clean;Dry;Intact 05/18/21 0815   Dressing Change/Treatment Frequency Every 72 hrs, and As Needed 06/15/21 0817   Non-staged Wound Description Partial thickness 08/10/21 0856   Wound Length (cm) 0.4 cm 08/03/21 0820   Wound Width (cm) 0.6 cm 08/03/21  0820   Wound Depth (cm) 0.1 cm 07/20/21 0808   Wound Surface Area (cm^2) 0.24 cm^2 08/03/21 0820   Wound Volume (cm^3) 0.24 cm^3 07/20/21 0808   Post-Procedure Length (cm) 1.2 cm 07/20/21 0808   Post-Procedure Width (cm) 2.1 cm 07/20/21 0808   Post-Procedure Depth (cm) 0.1 cm 07/20/21 0808   Post-Procedure Surface Area (cm^2) 2.52 cm^2 07/20/21 0808   Post-Procedure Volume (cm^3) 0.25 cm^3 07/20/21 0808   Wound Healing % 89 07/20/21 0808   Wound Bed Granulation (%) 100 % 08/03/21 0820   Tunneling (cm) 0 cm 08/10/21 0856   Undermining (cm) 0 cm 08/10/21 0856   Wound Odor None 08/10/21 0856   Pulses N/A 05/11/21 0730   Exposed Structures None 08/10/21 0856        PROCEDURE:   Debridement not necessary in clinic today.  See interval history    Pertinent Labs and Diagnostics:    Labs:     A1c:   Lab Results   Component Value Date/Time    HBA1C 5.5 06/28/2021 02:47 AM          IMAGING: None found in epic    VASCULAR STUDIES: N/A    LAST  WOUND CULTURE:  DATE : 5/18/2021-culture collected in clinic      ASSESSMENT AND PLAN:   1. Open wound of abdomen, subsequent encounter  Comments: Spontaneous opening of wound over hernia, started as small wound which progressively enlarged.    08/10/21: Wound approximately same size in clinic today.  -Excisional debridement of wound in clinic not necessary today.  -Patient to return to clinic in 2 weeks for assessment.  If wound has resolved patient is to call and cancel appointment.  -Patient to change dressing every 3 to 4 days or if the wound dressing is saturated.    Wound care: Hydrofera Blue ready, silicone adhesive foam, mupirocin ointment to satellite pustules.    2. Wound infection    08/10/21: Three small satellite pustules most likely folliculitis mupirocin ointment applied patient has mupirocin ointment at home to reapply.  -Continue to monitor for signs symptoms of infection at each additional clinic visit    3. History of hernia repair  Comments: Per patient, 5 surgical  hernia repairs, all of which have failed.  He is apparently not currently a candidate for another surgery, however does mention today that he plans undergo surgery with Dr. Berrios in November.    4. Class 2 severe obesity due to excess calories with serious comorbidity in adult, unspecified BMI (HCC)    08/10/21: Patient states he has an intentional weight loss of 30 pounds in the last 6 months.  He plans to continue his efforts to lose more weight.  He is aware of how his weight contributes to his abdominal hernia    PATIENT EDUCATION  - Importance of adequate nutrition for wound healing  -Advised to go to ER for any increased redness, swelling, drainage, or odor, or if patient develops fever, chills, nausea or vomiting.     >10 min spent face to face with patient, >50% of time spent counseling, coordinating care, reviewing records, discussing POC, educating patient.  Time spent in excess of procedural time.      Please note that this note may have been created using voice recognition software. I have worked with technical experts from [employer] to optimize the interface.  I have made every reasonable attempt to correct obvious errors, but there may be errors of grammar and possibly content that I did not discover before finalizing the note.    N

## 2021-08-10 NOTE — PATIENT INSTRUCTIONS
-Keep dressings clean and dry. Change dressings if they become over saturated, soiled or fall off.     -Should you experience any significant changes in your wound(s), such as signs of infection (redness, swelling, localized heat, increased pain, fever > 101 F, chills) or have any questions regarding your home care instructions, please contact the wound center at [phone]. If after hours, contact your primary care physician or go to the hospital emergency room.     -If you are 5 or more minutes late for an appointment, we reserve the right to cancel and reschedule that appointment. Additionally, if you are habitually late or not showing (3 late cancellations and/or no shows), we reserve the right to cancel your remaining appointments and it will be your responsibility to obtain a new referral if services are still needed.

## 2021-08-24 ENCOUNTER — OFFICE VISIT (OUTPATIENT)
Dept: WOUND CARE | Facility: MEDICAL CENTER | Age: 56
End: 2021-08-24
Attending: STUDENT IN AN ORGANIZED HEALTH CARE EDUCATION/TRAINING PROGRAM
Payer: COMMERCIAL

## 2021-08-24 VITALS
OXYGEN SATURATION: 98 % | DIASTOLIC BLOOD PRESSURE: 82 MMHG | TEMPERATURE: 96.6 F | SYSTOLIC BLOOD PRESSURE: 123 MMHG | RESPIRATION RATE: 18 BRPM | HEART RATE: 72 BPM

## 2021-08-24 DIAGNOSIS — S31.109D OPEN WOUND OF ABDOMEN, SUBSEQUENT ENCOUNTER: ICD-10-CM

## 2021-08-24 DIAGNOSIS — T14.8XXA WOUND INFECTION: ICD-10-CM

## 2021-08-24 DIAGNOSIS — Z87.19 HISTORY OF HERNIA REPAIR: ICD-10-CM

## 2021-08-24 DIAGNOSIS — Z98.890 HISTORY OF HERNIA REPAIR: ICD-10-CM

## 2021-08-24 DIAGNOSIS — E66.01 CLASS 2 SEVERE OBESITY DUE TO EXCESS CALORIES WITH SERIOUS COMORBIDITY IN ADULT, UNSPECIFIED BMI (HCC): ICD-10-CM

## 2021-08-24 DIAGNOSIS — L08.9 WOUND INFECTION: ICD-10-CM

## 2021-08-24 PROCEDURE — 99212 OFFICE O/P EST SF 10 MIN: CPT

## 2021-08-24 PROCEDURE — 99212 OFFICE O/P EST SF 10 MIN: CPT | Performed by: NURSE PRACTITIONER

## 2021-08-24 ASSESSMENT — ENCOUNTER SYMPTOMS
DIZZINESS: 0
SHORTNESS OF BREATH: 0
NAUSEA: 0
DEPRESSION: 0
CHILLS: 0
DIARRHEA: 0
CONSTIPATION: 0
FEVER: 0
BACK PAIN: 0
WEIGHT LOSS: 1
VOMITING: 0
COUGH: 0
PALPITATIONS: 0
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
## 2021-08-24 NOTE — PATIENT INSTRUCTIONS
-Keep dressings clean and dry. Change dressings if they become over saturated, soiled or fall off.     -Should you experience any significant changes in your wound(s), such as signs of infection (redness, swelling, localized heat, increased pain, fever > 101 F, chills) or have any questions regarding your home care instructions, please contact the wound center at [phone]. If after hours, contact your primary care physician or go to the hospital emergency room.     -If you are 5 or more minutes late for an appointment, we reserve the right to cancel and reschedule that appointment. Additionally, if you are habitually late or not showing (3 late cancellations and/or no shows), we reserve the right to cancel your remaining appointments and it will be your responsibility to obtain a new referral if services are still needed.

## 2021-08-25 ENCOUNTER — HOSPITAL ENCOUNTER (OUTPATIENT)
Dept: RADIOLOGY | Facility: MEDICAL CENTER | Age: 56
End: 2021-08-25
Attending: STUDENT IN AN ORGANIZED HEALTH CARE EDUCATION/TRAINING PROGRAM
Payer: COMMERCIAL

## 2021-08-25 DIAGNOSIS — R05.9 COUGH: ICD-10-CM

## 2021-08-25 PROCEDURE — 71046 X-RAY EXAM CHEST 2 VIEWS: CPT

## 2021-08-31 ENCOUNTER — OFFICE VISIT (OUTPATIENT)
Dept: WOUND CARE | Facility: MEDICAL CENTER | Age: 56
End: 2021-08-31
Attending: STUDENT IN AN ORGANIZED HEALTH CARE EDUCATION/TRAINING PROGRAM
Payer: COMMERCIAL

## 2021-08-31 VITALS
TEMPERATURE: 97.1 F | HEART RATE: 85 BPM | SYSTOLIC BLOOD PRESSURE: 152 MMHG | OXYGEN SATURATION: 97 % | DIASTOLIC BLOOD PRESSURE: 91 MMHG | RESPIRATION RATE: 19 BRPM

## 2021-08-31 DIAGNOSIS — S31.109D OPEN WOUND OF ABDOMEN, SUBSEQUENT ENCOUNTER: Primary | ICD-10-CM

## 2021-08-31 DIAGNOSIS — Z98.890 HISTORY OF HERNIA REPAIR: ICD-10-CM

## 2021-08-31 DIAGNOSIS — E66.01 CLASS 2 SEVERE OBESITY DUE TO EXCESS CALORIES WITH SERIOUS COMORBIDITY IN ADULT, UNSPECIFIED BMI (HCC): ICD-10-CM

## 2021-08-31 DIAGNOSIS — L08.9 WOUND INFECTION: ICD-10-CM

## 2021-08-31 DIAGNOSIS — T14.8XXA WOUND INFECTION: ICD-10-CM

## 2021-08-31 DIAGNOSIS — Z87.19 HISTORY OF HERNIA REPAIR: ICD-10-CM

## 2021-08-31 PROCEDURE — 99213 OFFICE O/P EST LOW 20 MIN: CPT

## 2021-08-31 PROCEDURE — 99213 OFFICE O/P EST LOW 20 MIN: CPT | Performed by: NURSE PRACTITIONER

## 2021-08-31 ASSESSMENT — ENCOUNTER SYMPTOMS
SHORTNESS OF BREATH: 0
WEIGHT LOSS: 1
BACK PAIN: 0
CONSTIPATION: 0
COUGH: 0
DIZZINESS: 0
DEPRESSION: 0
VOMITING: 0
DIARRHEA: 0
CHILLS: 0
FEVER: 0
NAUSEA: 0
PALPITATIONS: 0
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
## 2021-08-31 NOTE — PATIENT INSTRUCTIONS
-Keep your wound dressing clean, dry, and intact.    -Change your dressing if it becomes soiled, soaked, or falls off.      - Resolved wound be fragile for a few days, bathe and dry area gently, only ever regains a maximum of 80% of the tensile strength of the surrounding skin, remodeling of scar can continue for 6mo - a year. Contact PCP for a referral back her if any problems with area opening and draining again.    -Should you experience any significant changes in your wound(s), such as infection (redness, swelling, localized heat, increased pain, fever > 101 F, chills) or have any questions regarding your home care instructions, please contact the wound center at [phone]. If after hours, contact your primary care physician or go to the hospital emergency room.

## 2021-09-07 ENCOUNTER — NON-PROVIDER VISIT (OUTPATIENT)
Dept: WOUND CARE | Facility: MEDICAL CENTER | Age: 56
End: 2021-09-07
Attending: STUDENT IN AN ORGANIZED HEALTH CARE EDUCATION/TRAINING PROGRAM
Payer: COMMERCIAL

## 2021-09-07 PROCEDURE — 97602 WOUND(S) CARE NON-SELECTIVE: CPT

## 2021-09-07 NOTE — PROCEDURES
Non-selective debridement with puracyn spray and clean gauze to remove non-viable tissue from the wound bed and periwound areas. Patient tolerated the procedure well.

## 2021-09-07 NOTE — PATIENT INSTRUCTIONS
-Keep your wound dressing clean, dry, and intact.    -Change your dressing if it becomes soiled, soaked, or falls off.    -Should you experience any significant changes in your wound(s), such as infection (redness, swelling, localized heat, increased pain, fever > 101 F, chills) or have any questions regarding your home care instructions, please contact the wound center at [phone]. If after hours, contact your primary care physician or go to the hospital emergency room.

## 2021-09-14 ENCOUNTER — OFFICE VISIT (OUTPATIENT)
Dept: WOUND CARE | Facility: MEDICAL CENTER | Age: 56
End: 2021-09-14
Attending: STUDENT IN AN ORGANIZED HEALTH CARE EDUCATION/TRAINING PROGRAM
Payer: COMMERCIAL

## 2021-09-14 VITALS
HEART RATE: 67 BPM | OXYGEN SATURATION: 96 % | TEMPERATURE: 97.9 F | SYSTOLIC BLOOD PRESSURE: 153 MMHG | RESPIRATION RATE: 18 BRPM | DIASTOLIC BLOOD PRESSURE: 88 MMHG

## 2021-09-14 DIAGNOSIS — L08.9 WOUND INFECTION: ICD-10-CM

## 2021-09-14 DIAGNOSIS — E66.01 CLASS 2 SEVERE OBESITY DUE TO EXCESS CALORIES WITH SERIOUS COMORBIDITY IN ADULT, UNSPECIFIED BMI (HCC): ICD-10-CM

## 2021-09-14 DIAGNOSIS — Z87.19 HISTORY OF HERNIA REPAIR: ICD-10-CM

## 2021-09-14 DIAGNOSIS — T14.8XXA WOUND INFECTION: ICD-10-CM

## 2021-09-14 DIAGNOSIS — Z98.890 HISTORY OF HERNIA REPAIR: ICD-10-CM

## 2021-09-14 DIAGNOSIS — S31.109D OPEN WOUND OF ABDOMEN, SUBSEQUENT ENCOUNTER: Primary | ICD-10-CM

## 2021-09-14 PROCEDURE — 99213 OFFICE O/P EST LOW 20 MIN: CPT | Performed by: NURSE PRACTITIONER

## 2021-09-14 PROCEDURE — 99213 OFFICE O/P EST LOW 20 MIN: CPT

## 2021-09-14 ASSESSMENT — ENCOUNTER SYMPTOMS
CONSTIPATION: 0
DEPRESSION: 0
FEVER: 0
VOMITING: 0
COUGH: 0
SHORTNESS OF BREATH: 0
DIZZINESS: 0
BACK PAIN: 0
WEIGHT LOSS: 1
CHILLS: 0
PALPITATIONS: 0
NAUSEA: 0
DIARRHEA: 0
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
## 2021-09-14 NOTE — PATIENT INSTRUCTIONS
-Keep dressings clean and dry. Change dressings if they become over saturated, soiled or fall off.     -Should you experience any significant changes in your wound(s), such as signs of infection (redness, swelling, localized heat, increased pain, fever > 101 F, chills) or have any questions regarding your home care instructions, please contact the wound center at [phone]. If after hours, contact your primary care physician or go to the hospital emergency room.     -If you are 5 or more minutes late for an appointment, we reserve the right to cancel and reschedule that appointment. Additionally, if you are habitually late or not showing (3 late cancellations and/or no shows), we reserve the right to cancel your remaining appointments and it will be your responsibility to obtain a new referral if services are still needed.

## 2021-09-21 ENCOUNTER — OFFICE VISIT (OUTPATIENT)
Dept: WOUND CARE | Facility: MEDICAL CENTER | Age: 56
End: 2021-09-21
Attending: STUDENT IN AN ORGANIZED HEALTH CARE EDUCATION/TRAINING PROGRAM
Payer: COMMERCIAL

## 2021-09-21 VITALS
HEART RATE: 69 BPM | SYSTOLIC BLOOD PRESSURE: 142 MMHG | DIASTOLIC BLOOD PRESSURE: 101 MMHG | TEMPERATURE: 97.5 F | RESPIRATION RATE: 19 BRPM | OXYGEN SATURATION: 97 %

## 2021-09-21 DIAGNOSIS — S31.109D OPEN WOUND OF ABDOMEN, SUBSEQUENT ENCOUNTER: ICD-10-CM

## 2021-09-21 DIAGNOSIS — L08.9 WOUND INFECTION: ICD-10-CM

## 2021-09-21 DIAGNOSIS — Z87.19 HISTORY OF HERNIA REPAIR: ICD-10-CM

## 2021-09-21 DIAGNOSIS — E66.01 CLASS 2 SEVERE OBESITY DUE TO EXCESS CALORIES WITH SERIOUS COMORBIDITY IN ADULT, UNSPECIFIED BMI (HCC): ICD-10-CM

## 2021-09-21 DIAGNOSIS — Z98.890 HISTORY OF HERNIA REPAIR: ICD-10-CM

## 2021-09-21 DIAGNOSIS — T14.8XXA WOUND INFECTION: ICD-10-CM

## 2021-09-21 PROCEDURE — 99213 OFFICE O/P EST LOW 20 MIN: CPT

## 2021-09-21 PROCEDURE — 99213 OFFICE O/P EST LOW 20 MIN: CPT | Performed by: NURSE PRACTITIONER

## 2021-09-21 ASSESSMENT — ENCOUNTER SYMPTOMS
COUGH: 0
NAUSEA: 0
WEIGHT LOSS: 1
DEPRESSION: 0
SHORTNESS OF BREATH: 0
CHILLS: 0
FEVER: 0
DIARRHEA: 0
VOMITING: 0
DIZZINESS: 0
CONSTIPATION: 0
PALPITATIONS: 0
BACK PAIN: 0
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
## 2021-09-21 NOTE — PATIENT INSTRUCTIONS
-Keep dressings clean and dry. Change dressings once between wound clinic visits, and if they become over saturated, soiled or fall off.     -Should you experience any significant changes in your wound, such as signs of infection (increasing redness, swelling, localized heat, increased pain, fever > 101 F, chills) or have any questions regarding your home care instructions, please contact the wound center at [phone]. If after hours, contact your primary care physician or go to the hospital emergency room.     -If you are 5 or more minutes late for an appointment, we reserve the right to cancel and reschedule that appointment. Additionally, if you are habitually late or not showing (3 late cancellations and/or no shows), we reserve the right to cancel your remaining appointments and it will be your responsibility to obtain a new referral if services are still needed.

## 2021-09-28 ENCOUNTER — OFFICE VISIT (OUTPATIENT)
Dept: WOUND CARE | Facility: MEDICAL CENTER | Age: 56
End: 2021-09-28
Attending: STUDENT IN AN ORGANIZED HEALTH CARE EDUCATION/TRAINING PROGRAM
Payer: COMMERCIAL

## 2021-09-28 VITALS
TEMPERATURE: 97.2 F | HEART RATE: 78 BPM | OXYGEN SATURATION: 98 % | DIASTOLIC BLOOD PRESSURE: 74 MMHG | RESPIRATION RATE: 18 BRPM | SYSTOLIC BLOOD PRESSURE: 142 MMHG

## 2021-09-28 DIAGNOSIS — L08.9 WOUND INFECTION: ICD-10-CM

## 2021-09-28 DIAGNOSIS — S31.109D OPEN WOUND OF ABDOMEN, SUBSEQUENT ENCOUNTER: ICD-10-CM

## 2021-09-28 DIAGNOSIS — Z98.890 HISTORY OF HERNIA REPAIR: ICD-10-CM

## 2021-09-28 DIAGNOSIS — T14.8XXA WOUND INFECTION: ICD-10-CM

## 2021-09-28 DIAGNOSIS — E66.01 CLASS 2 SEVERE OBESITY DUE TO EXCESS CALORIES WITH SERIOUS COMORBIDITY IN ADULT, UNSPECIFIED BMI (HCC): ICD-10-CM

## 2021-09-28 DIAGNOSIS — Z87.19 HISTORY OF HERNIA REPAIR: ICD-10-CM

## 2021-09-28 PROCEDURE — 11042 DBRDMT SUBQ TIS 1ST 20SQCM/<: CPT

## 2021-09-28 PROCEDURE — 11042 DBRDMT SUBQ TIS 1ST 20SQCM/<: CPT | Performed by: NURSE PRACTITIONER

## 2021-09-28 PROCEDURE — 99213 OFFICE O/P EST LOW 20 MIN: CPT | Mod: 25 | Performed by: NURSE PRACTITIONER

## 2021-09-28 PROCEDURE — 99213 OFFICE O/P EST LOW 20 MIN: CPT

## 2021-09-28 ASSESSMENT — ENCOUNTER SYMPTOMS
CHILLS: 0
DIZZINESS: 0
DEPRESSION: 0
FEVER: 0
PALPITATIONS: 0
SHORTNESS OF BREATH: 0
BACK PAIN: 0
NAUSEA: 0
DIARRHEA: 0
VOMITING: 0
WEIGHT LOSS: 1
CONSTIPATION: 0
COUGH: 0

## 2021-09-28 ASSESSMENT — PAIN SCALES - GENERAL: PAINLEVEL: NO PAIN
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
## 2021-09-28 NOTE — PATIENT INSTRUCTIONS
Should you experience any significant changes in your wound(s) such as infection (redness, swelling, localized heat, increased pain, fever >101 F, chills) or have any questions regarding your home care instructions, please contact the wound center [phone]. If after hours, contact your primary care physician or go the hospital emergency room.  Keep dressing clean and dry and cover while bathing. Only change dressing if over saturated, soiled or its falling off or every 72 hours with the moistened collagen & a four border bandage.

## 2021-10-06 ENCOUNTER — OFFICE VISIT (OUTPATIENT)
Dept: WOUND CARE | Facility: MEDICAL CENTER | Age: 56
End: 2021-10-06
Attending: STUDENT IN AN ORGANIZED HEALTH CARE EDUCATION/TRAINING PROGRAM
Payer: COMMERCIAL

## 2021-10-06 ENCOUNTER — HOSPITAL ENCOUNTER (OUTPATIENT)
Facility: MEDICAL CENTER | Age: 56
End: 2021-10-06
Attending: NURSE PRACTITIONER
Payer: COMMERCIAL

## 2021-10-06 VITALS
HEART RATE: 69 BPM | TEMPERATURE: 97.6 F | SYSTOLIC BLOOD PRESSURE: 154 MMHG | DIASTOLIC BLOOD PRESSURE: 99 MMHG | OXYGEN SATURATION: 97 % | RESPIRATION RATE: 18 BRPM

## 2021-10-06 DIAGNOSIS — S31.109D OPEN WOUND OF ABDOMEN, SUBSEQUENT ENCOUNTER: ICD-10-CM

## 2021-10-06 DIAGNOSIS — Z98.890 HISTORY OF HERNIA REPAIR: ICD-10-CM

## 2021-10-06 DIAGNOSIS — L08.9 WOUND INFECTION: ICD-10-CM

## 2021-10-06 DIAGNOSIS — T14.8XXA WOUND INFECTION: ICD-10-CM

## 2021-10-06 DIAGNOSIS — Z87.19 HISTORY OF HERNIA REPAIR: ICD-10-CM

## 2021-10-06 DIAGNOSIS — E66.01 CLASS 2 SEVERE OBESITY DUE TO EXCESS CALORIES WITH SERIOUS COMORBIDITY IN ADULT, UNSPECIFIED BMI (HCC): ICD-10-CM

## 2021-10-06 PROCEDURE — 99213 OFFICE O/P EST LOW 20 MIN: CPT

## 2021-10-06 PROCEDURE — 87070 CULTURE OTHR SPECIMN AEROBIC: CPT

## 2021-10-06 PROCEDURE — 11042 DBRDMT SUBQ TIS 1ST 20SQCM/<: CPT | Performed by: NURSE PRACTITIONER

## 2021-10-06 PROCEDURE — 11042 DBRDMT SUBQ TIS 1ST 20SQCM/<: CPT

## 2021-10-06 PROCEDURE — 99213 OFFICE O/P EST LOW 20 MIN: CPT | Mod: 25 | Performed by: NURSE PRACTITIONER

## 2021-10-06 PROCEDURE — 87205 SMEAR GRAM STAIN: CPT

## 2021-10-06 PROCEDURE — 87186 SC STD MICRODIL/AGAR DIL: CPT

## 2021-10-06 RX ORDER — AMOXICILLIN AND CLAVULANATE POTASSIUM 875; 125 MG/1; MG/1
1 TABLET, FILM COATED ORAL 2 TIMES DAILY
Qty: 20 TABLET | Refills: 0 | Status: SHIPPED | OUTPATIENT
Start: 2021-10-06 | End: 2021-10-16

## 2021-10-06 ASSESSMENT — ENCOUNTER SYMPTOMS
COUGH: 0
DEPRESSION: 0
NAUSEA: 0
PALPITATIONS: 0
VOMITING: 0
SHORTNESS OF BREATH: 0
CONSTIPATION: 0
FEVER: 0
DIZZINESS: 0
WEIGHT LOSS: 1
BACK PAIN: 0
DIARRHEA: 0
CHILLS: 0
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
## 2021-10-06 NOTE — PATIENT INSTRUCTIONS
-Keep your wound dressing clean, dry, and intact.    -Change your dressing if it becomes soiled, soaked, or falls off.    -Should you experience any significant changes in your wound(s), such as infection (redness, swelling, localized heat, increased pain, fever > 101 F, chills) or have any questions regarding your home care instructions, please contact the wound center at [phone]. If after hours, contact your primary care physician or go to the hospital emergency room.

## 2021-10-07 LAB
GRAM STN SPEC: NORMAL
SIGNIFICANT IND 70042: NORMAL
SITE SITE: NORMAL
SOURCE SOURCE: NORMAL

## 2021-10-12 ENCOUNTER — OFFICE VISIT (OUTPATIENT)
Dept: WOUND CARE | Facility: MEDICAL CENTER | Age: 56
End: 2021-10-12
Attending: STUDENT IN AN ORGANIZED HEALTH CARE EDUCATION/TRAINING PROGRAM
Payer: COMMERCIAL

## 2021-10-12 VITALS
RESPIRATION RATE: 18 BRPM | SYSTOLIC BLOOD PRESSURE: 160 MMHG | HEART RATE: 68 BPM | OXYGEN SATURATION: 95 % | DIASTOLIC BLOOD PRESSURE: 98 MMHG | TEMPERATURE: 97.1 F

## 2021-10-12 DIAGNOSIS — Z98.890 HISTORY OF HERNIA REPAIR: ICD-10-CM

## 2021-10-12 DIAGNOSIS — Z87.19 HISTORY OF HERNIA REPAIR: ICD-10-CM

## 2021-10-12 DIAGNOSIS — L08.9 WOUND INFECTION: ICD-10-CM

## 2021-10-12 DIAGNOSIS — T14.8XXA WOUND INFECTION: ICD-10-CM

## 2021-10-12 DIAGNOSIS — S31.109D OPEN WOUND OF ABDOMEN, SUBSEQUENT ENCOUNTER: ICD-10-CM

## 2021-10-12 DIAGNOSIS — E66.01 CLASS 2 SEVERE OBESITY DUE TO EXCESS CALORIES WITH SERIOUS COMORBIDITY IN ADULT, UNSPECIFIED BMI (HCC): ICD-10-CM

## 2021-10-12 PROCEDURE — 99213 OFFICE O/P EST LOW 20 MIN: CPT | Mod: 25 | Performed by: NURSE PRACTITIONER

## 2021-10-12 PROCEDURE — 11042 DBRDMT SUBQ TIS 1ST 20SQCM/<: CPT | Performed by: NURSE PRACTITIONER

## 2021-10-12 PROCEDURE — 99213 OFFICE O/P EST LOW 20 MIN: CPT

## 2021-10-12 PROCEDURE — 11042 DBRDMT SUBQ TIS 1ST 20SQCM/<: CPT

## 2021-10-12 ASSESSMENT — ENCOUNTER SYMPTOMS
PALPITATIONS: 0
SHORTNESS OF BREATH: 0
VOMITING: 0
COUGH: 0
CHILLS: 0
WEIGHT LOSS: 1
DEPRESSION: 0
CONSTIPATION: 0
DIARRHEA: 0
BACK PAIN: 0
NAUSEA: 0
DIZZINESS: 0
FEVER: 0
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
## 2021-10-12 NOTE — PATIENT INSTRUCTIONS
-Keep dressings clean and dry. Change dressings once between wound clinic visits, and if they become over saturated, soiled or fall off.     -Should you experience any significant changes in your wounds, such as signs of infection (increasing redness, swelling, localized heat, increased pain, fever > 101 F, chills) or have any questions regarding your home care instructions, please contact the wound center at [phone]. If after hours, contact your primary care physician or go to the hospital emergency room.     -If you are 5 or more minutes late for an appointment, we reserve the right to cancel and reschedule that appointment. Additionally, if you are habitually late or not showing (3 late cancellations and/or no shows), we reserve the right to cancel your remaining appointments and it will be your responsibility to obtain a new referral if services are still needed.

## 2021-10-19 ENCOUNTER — OFFICE VISIT (OUTPATIENT)
Dept: WOUND CARE | Facility: MEDICAL CENTER | Age: 56
End: 2021-10-19
Attending: STUDENT IN AN ORGANIZED HEALTH CARE EDUCATION/TRAINING PROGRAM
Payer: COMMERCIAL

## 2021-10-19 VITALS
RESPIRATION RATE: 18 BRPM | SYSTOLIC BLOOD PRESSURE: 156 MMHG | HEART RATE: 70 BPM | TEMPERATURE: 96.8 F | OXYGEN SATURATION: 94 % | DIASTOLIC BLOOD PRESSURE: 100 MMHG

## 2021-10-19 DIAGNOSIS — S31.109D OPEN WOUND OF ABDOMEN, SUBSEQUENT ENCOUNTER: ICD-10-CM

## 2021-10-19 PROCEDURE — 99213 OFFICE O/P EST LOW 20 MIN: CPT | Performed by: NURSE PRACTITIONER

## 2021-10-19 PROCEDURE — 99212 OFFICE O/P EST SF 10 MIN: CPT

## 2021-10-19 ASSESSMENT — ENCOUNTER SYMPTOMS
CONSTIPATION: 0
WEIGHT LOSS: 1
DIZZINESS: 0
PALPITATIONS: 0
BACK PAIN: 0
CHILLS: 0
VOMITING: 0
DIARRHEA: 0
DEPRESSION: 0
COUGH: 0
SHORTNESS OF BREATH: 0
FEVER: 0
NAUSEA: 0

## 2021-10-19 NOTE — PATIENT INSTRUCTIONS
- Resolved wound be fragile for a few days, bathe and dry area gently, only ever regains a maximum of 80% of the tensile strength of the surrounding skin, remodeling of scar can continue for 6mo - a year. Contact PCP for a referral back her if any problems with area opening and draining again.    -Should you experience any significant changes in your wound(s), such as infection (redness, swelling, localized heat, increased pain, fever > 101 F, chills) or have any questions regarding your home care instructions, please contact the wound center at [phone]. If after hours, contact your primary care physician or go to the hospital emergency room.
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
## 2021-11-23 ENCOUNTER — PRE-ADMISSION TESTING (OUTPATIENT)
Dept: ADMISSIONS | Facility: MEDICAL CENTER | Age: 56
DRG: 355 | End: 2021-11-23
Attending: SURGERY
Payer: COMMERCIAL

## 2021-11-23 DIAGNOSIS — Z01.812 PRE-OPERATIVE LABORATORY EXAMINATION: ICD-10-CM

## 2021-11-23 DIAGNOSIS — Z01.810 PRE-OPERATIVE CARDIOVASCULAR EXAMINATION: ICD-10-CM

## 2021-11-23 LAB
ANION GAP SERPL CALC-SCNC: 11 MMOL/L (ref 7–16)
BUN SERPL-MCNC: 27 MG/DL (ref 8–22)
CALCIUM SERPL-MCNC: 9.4 MG/DL (ref 8.5–10.5)
CHLORIDE SERPL-SCNC: 104 MMOL/L (ref 96–112)
CO2 SERPL-SCNC: 27 MMOL/L (ref 20–33)
CREAT SERPL-MCNC: 1.09 MG/DL (ref 0.5–1.4)
EKG IMPRESSION: NORMAL
ERYTHROCYTE [DISTWIDTH] IN BLOOD BY AUTOMATED COUNT: 40.7 FL (ref 35.9–50)
GLUCOSE SERPL-MCNC: 114 MG/DL (ref 65–99)
HCT VFR BLD AUTO: 50.2 % (ref 42–52)
HGB BLD-MCNC: 17.1 G/DL (ref 14–18)
MCH RBC QN AUTO: 30.3 PG (ref 27–33)
MCHC RBC AUTO-ENTMCNC: 34.1 G/DL (ref 33.7–35.3)
MCV RBC AUTO: 89 FL (ref 81.4–97.8)
PLATELET # BLD AUTO: 164 K/UL (ref 164–446)
PMV BLD AUTO: 12.5 FL (ref 9–12.9)
POTASSIUM SERPL-SCNC: 4.3 MMOL/L (ref 3.6–5.5)
RBC # BLD AUTO: 5.64 M/UL (ref 4.7–6.1)
SODIUM SERPL-SCNC: 142 MMOL/L (ref 135–145)
WBC # BLD AUTO: 7 K/UL (ref 4.8–10.8)

## 2021-11-23 PROCEDURE — 93005 ELECTROCARDIOGRAM TRACING: CPT

## 2021-11-23 PROCEDURE — 80048 BASIC METABOLIC PNL TOTAL CA: CPT

## 2021-11-23 PROCEDURE — 85027 COMPLETE CBC AUTOMATED: CPT

## 2021-11-23 PROCEDURE — 36415 COLL VENOUS BLD VENIPUNCTURE: CPT

## 2021-11-23 PROCEDURE — 93010 ELECTROCARDIOGRAM REPORT: CPT | Performed by: INTERNAL MEDICINE

## 2021-11-23 RX ORDER — NAPROXEN 250 MG/1
500 TABLET ORAL DAILY
COMMUNITY

## 2021-11-23 RX ORDER — CEPHALEXIN 250 MG/1
CAPSULE ORAL
Status: ON HOLD | COMMUNITY
Start: 2021-11-19 | End: 2021-12-06

## 2021-11-23 ASSESSMENT — FIBROSIS 4 INDEX: FIB4 SCORE: 1.95

## 2021-12-03 ENCOUNTER — PRE-ADMISSION TESTING (OUTPATIENT)
Dept: ADMISSIONS | Facility: MEDICAL CENTER | Age: 56
DRG: 355 | End: 2021-12-03
Attending: SURGERY
Payer: COMMERCIAL

## 2021-12-03 DIAGNOSIS — Z01.812 PRE-OPERATIVE LABORATORY EXAMINATION: ICD-10-CM

## 2021-12-03 LAB — COVID ORDER STATUS COVID19: NORMAL

## 2021-12-03 PROCEDURE — U0003 INFECTIOUS AGENT DETECTION BY NUCLEIC ACID (DNA OR RNA); SEVERE ACUTE RESPIRATORY SYNDROME CORONAVIRUS 2 (SARS-COV-2) (CORONAVIRUS DISEASE [COVID-19]), AMPLIFIED PROBE TECHNIQUE, MAKING USE OF HIGH THROUGHPUT TECHNOLOGIES AS DESCRIBED BY CMS-2020-01-R: HCPCS

## 2021-12-03 PROCEDURE — U0005 INFEC AGEN DETEC AMPLI PROBE: HCPCS

## 2021-12-04 LAB
SARS-COV-2 RNA RESP QL NAA+PROBE: NOTDETECTED
SPECIMEN SOURCE: NORMAL

## 2021-12-06 ENCOUNTER — ANESTHESIA (OUTPATIENT)
Dept: SURGERY | Facility: MEDICAL CENTER | Age: 56
DRG: 355 | End: 2021-12-06
Payer: COMMERCIAL

## 2021-12-06 ENCOUNTER — ANESTHESIA EVENT (OUTPATIENT)
Dept: SURGERY | Facility: MEDICAL CENTER | Age: 56
DRG: 355 | End: 2021-12-06
Payer: COMMERCIAL

## 2021-12-06 ENCOUNTER — HOSPITAL ENCOUNTER (INPATIENT)
Facility: MEDICAL CENTER | Age: 56
LOS: 1 days | DRG: 355 | End: 2021-12-07
Attending: SURGERY | Admitting: SURGERY
Payer: COMMERCIAL

## 2021-12-06 DIAGNOSIS — G89.18 POST-OPERATIVE PAIN: ICD-10-CM

## 2021-12-06 PROBLEM — K43.2 INCISIONAL HERNIA, WITHOUT OBSTRUCTION OR GANGRENE: Status: ACTIVE | Noted: 2021-12-06

## 2021-12-06 PROCEDURE — 160046 HCHG PACU - 1ST 60 MINS PHASE II: Performed by: SURGERY

## 2021-12-06 PROCEDURE — 62320 NJX INTERLAMINAR CRV/THRC: CPT | Performed by: SURGERY

## 2021-12-06 PROCEDURE — 502240 HCHG MISC OR SUPPLY RC 0272: Performed by: SURGERY

## 2021-12-06 PROCEDURE — 502714 HCHG ROBOTIC SURGERY SERVICES: Performed by: SURGERY

## 2021-12-06 PROCEDURE — 306637 HCHG MISC ORTHO ITEM RC 0274

## 2021-12-06 PROCEDURE — 700105 HCHG RX REV CODE 258: Performed by: SURGERY

## 2021-12-06 PROCEDURE — 160048 HCHG OR STATISTICAL LEVEL 1-5: Performed by: SURGERY

## 2021-12-06 PROCEDURE — 0KRK47Z REPLACEMENT OF RIGHT ABDOMEN MUSCLE WITH AUTOLOGOUS TISSUE SUBSTITUTE, PERCUTANEOUS ENDOSCOPIC APPROACH: ICD-10-PCS | Performed by: SURGERY

## 2021-12-06 PROCEDURE — 160002 HCHG RECOVERY MINUTES (STAT): Performed by: SURGERY

## 2021-12-06 PROCEDURE — 700102 HCHG RX REV CODE 250 W/ 637 OVERRIDE(OP): Performed by: SURGERY

## 2021-12-06 PROCEDURE — 700111 HCHG RX REV CODE 636 W/ 250 OVERRIDE (IP): Performed by: ANESTHESIOLOGY

## 2021-12-06 PROCEDURE — A9270 NON-COVERED ITEM OR SERVICE: HCPCS | Performed by: ANESTHESIOLOGY

## 2021-12-06 PROCEDURE — 160035 HCHG PACU - 1ST 60 MINS PHASE I: Performed by: SURGERY

## 2021-12-06 PROCEDURE — C1781 MESH (IMPLANTABLE): HCPCS | Performed by: SURGERY

## 2021-12-06 PROCEDURE — 700101 HCHG RX REV CODE 250: Performed by: ANESTHESIOLOGY

## 2021-12-06 PROCEDURE — 500064 HCHG BINDER, 4-PANEL MED/LG: Performed by: SURGERY

## 2021-12-06 PROCEDURE — 8E0W4CZ ROBOTIC ASSISTED PROCEDURE OF TRUNK REGION, PERCUTANEOUS ENDOSCOPIC APPROACH: ICD-10-PCS | Performed by: SURGERY

## 2021-12-06 PROCEDURE — 0WUF4JZ SUPPLEMENT ABDOMINAL WALL WITH SYNTHETIC SUBSTITUTE, PERCUTANEOUS ENDOSCOPIC APPROACH: ICD-10-PCS | Performed by: SURGERY

## 2021-12-06 PROCEDURE — 500002 HCHG ADHESIVE, DERMABOND: Performed by: SURGERY

## 2021-12-06 PROCEDURE — A9270 NON-COVERED ITEM OR SERVICE: HCPCS | Performed by: SURGERY

## 2021-12-06 PROCEDURE — 500868 HCHG NEEDLE, SURGI(VARES): Performed by: SURGERY

## 2021-12-06 PROCEDURE — 160009 HCHG ANES TIME/MIN: Performed by: SURGERY

## 2021-12-06 PROCEDURE — 0KRL47Z REPLACEMENT OF LEFT ABDOMEN MUSCLE WITH AUTOLOGOUS TISSUE SUBSTITUTE, PERCUTANEOUS ENDOSCOPIC APPROACH: ICD-10-PCS | Performed by: SURGERY

## 2021-12-06 PROCEDURE — 160025 RECOVERY II MINUTES (STATS): Performed by: SURGERY

## 2021-12-06 PROCEDURE — 700105 HCHG RX REV CODE 258: Performed by: ANESTHESIOLOGY

## 2021-12-06 PROCEDURE — 700102 HCHG RX REV CODE 250 W/ 637 OVERRIDE(OP): Performed by: ANESTHESIOLOGY

## 2021-12-06 PROCEDURE — 160031 HCHG SURGERY MINUTES - 1ST 30 MINS LEVEL 5: Performed by: SURGERY

## 2021-12-06 PROCEDURE — 3E0S3BZ INTRODUCTION OF ANESTHETIC AGENT INTO EPIDURAL SPACE, PERCUTANEOUS APPROACH: ICD-10-PCS | Performed by: ANESTHESIOLOGY

## 2021-12-06 PROCEDURE — 501838 HCHG SUTURE GENERAL: Performed by: SURGERY

## 2021-12-06 PROCEDURE — 700101 HCHG RX REV CODE 250: Performed by: SURGERY

## 2021-12-06 PROCEDURE — 160042 HCHG SURGERY MINUTES - EA ADDL 1 MIN LEVEL 5: Performed by: SURGERY

## 2021-12-06 DEVICE — MESH SOFT 12 X 12: Type: IMPLANTABLE DEVICE | Site: ABDOMEN | Status: FUNCTIONAL

## 2021-12-06 RX ORDER — CEFAZOLIN SODIUM 1 G/3ML
INJECTION, POWDER, FOR SOLUTION INTRAMUSCULAR; INTRAVENOUS PRN
Status: DISCONTINUED | OUTPATIENT
Start: 2021-12-06 | End: 2021-12-06 | Stop reason: SURG

## 2021-12-06 RX ORDER — BUPIVACAINE HYDROCHLORIDE AND EPINEPHRINE 5; 5 MG/ML; UG/ML
INJECTION, SOLUTION EPIDURAL; INTRACAUDAL; PERINEURAL
Status: DISCONTINUED | OUTPATIENT
Start: 2021-12-06 | End: 2021-12-06 | Stop reason: HOSPADM

## 2021-12-06 RX ORDER — IBUPROFEN 200 MG
800 TABLET ORAL 3 TIMES DAILY PRN
Status: DISCONTINUED | OUTPATIENT
Start: 2021-12-11 | End: 2021-12-07 | Stop reason: HOSPADM

## 2021-12-06 RX ORDER — ACETAMINOPHEN 500 MG
1000 TABLET ORAL EVERY 6 HOURS
Status: DISCONTINUED | OUTPATIENT
Start: 2021-12-06 | End: 2021-12-06

## 2021-12-06 RX ORDER — DEXAMETHASONE SODIUM PHOSPHATE 4 MG/ML
INJECTION, SOLUTION INTRA-ARTICULAR; INTRALESIONAL; INTRAMUSCULAR; INTRAVENOUS; SOFT TISSUE PRN
Status: DISCONTINUED | OUTPATIENT
Start: 2021-12-06 | End: 2021-12-06 | Stop reason: SURG

## 2021-12-06 RX ORDER — DEXAMETHASONE SODIUM PHOSPHATE 4 MG/ML
4 INJECTION, SOLUTION INTRA-ARTICULAR; INTRALESIONAL; INTRAMUSCULAR; INTRAVENOUS; SOFT TISSUE
Status: DISCONTINUED | OUTPATIENT
Start: 2021-12-06 | End: 2021-12-07 | Stop reason: HOSPADM

## 2021-12-06 RX ORDER — OXYCODONE HYDROCHLORIDE 5 MG/1
10 TABLET ORAL
Status: DISCONTINUED | OUTPATIENT
Start: 2021-12-06 | End: 2021-12-06

## 2021-12-06 RX ORDER — ROCURONIUM BROMIDE 10 MG/ML
INJECTION, SOLUTION INTRAVENOUS PRN
Status: DISCONTINUED | OUTPATIENT
Start: 2021-12-06 | End: 2021-12-06 | Stop reason: SURG

## 2021-12-06 RX ORDER — LISINOPRIL AND HYDROCHLOROTHIAZIDE 20; 12.5 MG/1; MG/1
1 TABLET ORAL
Status: DISCONTINUED | OUTPATIENT
Start: 2021-12-06 | End: 2021-12-06

## 2021-12-06 RX ORDER — MIDAZOLAM HYDROCHLORIDE 1 MG/ML
INJECTION INTRAMUSCULAR; INTRAVENOUS PRN
Status: DISCONTINUED | OUTPATIENT
Start: 2021-12-06 | End: 2021-12-06 | Stop reason: SURG

## 2021-12-06 RX ORDER — METHOCARBAMOL 500 MG/1
500 TABLET, FILM COATED ORAL 3 TIMES DAILY
Status: DISCONTINUED | OUTPATIENT
Start: 2021-12-06 | End: 2021-12-07 | Stop reason: HOSPADM

## 2021-12-06 RX ORDER — ACETAMINOPHEN 500 MG
1000 TABLET ORAL EVERY 6 HOURS
Status: DISCONTINUED | OUTPATIENT
Start: 2021-12-07 | End: 2021-12-07 | Stop reason: HOSPADM

## 2021-12-06 RX ORDER — DIPHENHYDRAMINE HYDROCHLORIDE 50 MG/ML
12.5 INJECTION INTRAMUSCULAR; INTRAVENOUS EVERY 6 HOURS PRN
Status: DISCONTINUED | OUTPATIENT
Start: 2021-12-06 | End: 2021-12-07 | Stop reason: HOSPADM

## 2021-12-06 RX ORDER — SODIUM CHLORIDE 9 MG/ML
INJECTION, SOLUTION INTRAVENOUS CONTINUOUS
Status: ACTIVE | OUTPATIENT
Start: 2021-12-06 | End: 2021-12-06

## 2021-12-06 RX ORDER — SODIUM CHLORIDE, SODIUM LACTATE, POTASSIUM CHLORIDE, CALCIUM CHLORIDE 600; 310; 30; 20 MG/100ML; MG/100ML; MG/100ML; MG/100ML
INJECTION, SOLUTION INTRAVENOUS CONTINUOUS
Status: DISCONTINUED | OUTPATIENT
Start: 2021-12-06 | End: 2021-12-07

## 2021-12-06 RX ORDER — SODIUM CHLORIDE, SODIUM LACTATE, POTASSIUM CHLORIDE, CALCIUM CHLORIDE 600; 310; 30; 20 MG/100ML; MG/100ML; MG/100ML; MG/100ML
INJECTION, SOLUTION INTRAVENOUS CONTINUOUS
Status: ACTIVE | OUTPATIENT
Start: 2021-12-06 | End: 2021-12-06

## 2021-12-06 RX ORDER — HALOPERIDOL 5 MG/ML
1 INJECTION INTRAMUSCULAR
Status: DISCONTINUED | OUTPATIENT
Start: 2021-12-06 | End: 2021-12-07

## 2021-12-06 RX ORDER — CELECOXIB 200 MG/1
200 CAPSULE ORAL 2 TIMES DAILY WITH MEALS
Status: DISCONTINUED | OUTPATIENT
Start: 2021-12-06 | End: 2021-12-06

## 2021-12-06 RX ORDER — DOCUSATE SODIUM 100 MG/1
100 CAPSULE, LIQUID FILLED ORAL DAILY
Status: DISCONTINUED | OUTPATIENT
Start: 2021-12-07 | End: 2021-12-07 | Stop reason: HOSPADM

## 2021-12-06 RX ORDER — OMEPRAZOLE 20 MG/1
20 CAPSULE, DELAYED RELEASE ORAL DAILY
Status: DISCONTINUED | OUTPATIENT
Start: 2021-12-06 | End: 2021-12-07 | Stop reason: HOSPADM

## 2021-12-06 RX ORDER — CELECOXIB 200 MG/1
200 CAPSULE ORAL 2 TIMES DAILY WITH MEALS
Status: CANCELLED | OUTPATIENT
Start: 2021-12-06

## 2021-12-06 RX ORDER — HALOPERIDOL 5 MG/ML
1 INJECTION INTRAMUSCULAR EVERY 6 HOURS PRN
Status: DISCONTINUED | OUTPATIENT
Start: 2021-12-06 | End: 2021-12-07 | Stop reason: HOSPADM

## 2021-12-06 RX ORDER — LIDOCAINE HYDROCHLORIDE AND EPINEPHRINE 15; 5 MG/ML; UG/ML
INJECTION, SOLUTION EPIDURAL
Status: COMPLETED | OUTPATIENT
Start: 2021-12-06 | End: 2021-12-06

## 2021-12-06 RX ORDER — DIPHENHYDRAMINE HYDROCHLORIDE 50 MG/ML
25 INJECTION INTRAMUSCULAR; INTRAVENOUS EVERY 6 HOURS PRN
Status: DISCONTINUED | OUTPATIENT
Start: 2021-12-06 | End: 2021-12-07 | Stop reason: HOSPADM

## 2021-12-06 RX ORDER — ONDANSETRON 2 MG/ML
4 INJECTION INTRAMUSCULAR; INTRAVENOUS EVERY 4 HOURS PRN
Status: DISCONTINUED | OUTPATIENT
Start: 2021-12-06 | End: 2021-12-07 | Stop reason: HOSPADM

## 2021-12-06 RX ORDER — HYDROMORPHONE HYDROCHLORIDE 1 MG/ML
0.5 INJECTION, SOLUTION INTRAMUSCULAR; INTRAVENOUS; SUBCUTANEOUS
Status: DISCONTINUED | OUTPATIENT
Start: 2021-12-06 | End: 2021-12-06

## 2021-12-06 RX ORDER — PHENYLEPHRINE HCL IN 0.9% NACL 0.5 MG/5ML
SYRINGE (ML) INTRAVENOUS PRN
Status: DISCONTINUED | OUTPATIENT
Start: 2021-12-06 | End: 2021-12-06 | Stop reason: SURG

## 2021-12-06 RX ORDER — ROPIVACAINE HYDROCHLORIDE 5 MG/ML
INJECTION, SOLUTION EPIDURAL; INFILTRATION; PERINEURAL PRN
Status: DISCONTINUED | OUTPATIENT
Start: 2021-12-06 | End: 2021-12-06 | Stop reason: SURG

## 2021-12-06 RX ORDER — OXYCODONE HCL 5 MG/5 ML
5 SOLUTION, ORAL ORAL
Status: COMPLETED | OUTPATIENT
Start: 2021-12-06 | End: 2021-12-06

## 2021-12-06 RX ORDER — HYDROCHLOROTHIAZIDE 12.5 MG/1
12.5 TABLET ORAL
Status: DISCONTINUED | OUTPATIENT
Start: 2021-12-06 | End: 2021-12-07 | Stop reason: HOSPADM

## 2021-12-06 RX ORDER — SODIUM CHLORIDE, SODIUM LACTATE, POTASSIUM CHLORIDE, AND CALCIUM CHLORIDE .6; .31; .03; .02 G/100ML; G/100ML; G/100ML; G/100ML
250 INJECTION, SOLUTION INTRAVENOUS PRN
Status: DISCONTINUED | OUTPATIENT
Start: 2021-12-06 | End: 2021-12-07 | Stop reason: HOSPADM

## 2021-12-06 RX ORDER — ONDANSETRON 2 MG/ML
INJECTION INTRAMUSCULAR; INTRAVENOUS PRN
Status: DISCONTINUED | OUTPATIENT
Start: 2021-12-06 | End: 2021-12-06 | Stop reason: SURG

## 2021-12-06 RX ORDER — CALCIUM CARBONATE 500 MG/1
500 TABLET, CHEWABLE ORAL
Status: DISCONTINUED | OUTPATIENT
Start: 2021-12-06 | End: 2021-12-07 | Stop reason: HOSPADM

## 2021-12-06 RX ORDER — CELECOXIB 200 MG/1
400 CAPSULE ORAL ONCE
Status: COMPLETED | OUTPATIENT
Start: 2021-12-06 | End: 2021-12-06

## 2021-12-06 RX ORDER — ACETAMINOPHEN 650 MG/1
975 SUPPOSITORY RECTAL EVERY 6 HOURS
Status: DISCONTINUED | OUTPATIENT
Start: 2021-12-06 | End: 2021-12-07 | Stop reason: HOSPADM

## 2021-12-06 RX ORDER — GABAPENTIN 300 MG/1
300 CAPSULE ORAL ONCE
Status: COMPLETED | OUTPATIENT
Start: 2021-12-06 | End: 2021-12-06

## 2021-12-06 RX ORDER — LISINOPRIL 20 MG/1
20 TABLET ORAL
Status: DISCONTINUED | OUTPATIENT
Start: 2021-12-06 | End: 2021-12-07 | Stop reason: HOSPADM

## 2021-12-06 RX ORDER — MIDAZOLAM HYDROCHLORIDE 1 MG/ML
1 INJECTION INTRAMUSCULAR; INTRAVENOUS
Status: DISCONTINUED | OUTPATIENT
Start: 2021-12-06 | End: 2021-12-07

## 2021-12-06 RX ORDER — DIPHENHYDRAMINE HCL 25 MG
12.5 TABLET ORAL EVERY 6 HOURS PRN
Status: DISCONTINUED | OUTPATIENT
Start: 2021-12-06 | End: 2021-12-07 | Stop reason: HOSPADM

## 2021-12-06 RX ORDER — DIPHENHYDRAMINE HYDROCHLORIDE 50 MG/ML
12.5 INJECTION INTRAMUSCULAR; INTRAVENOUS
Status: DISCONTINUED | OUTPATIENT
Start: 2021-12-06 | End: 2021-12-07

## 2021-12-06 RX ORDER — OXYCODONE HCL 10 MG/1
10 TABLET, FILM COATED, EXTENDED RELEASE ORAL ONCE
Status: CANCELLED | OUTPATIENT
Start: 2021-12-06 | End: 2021-12-06

## 2021-12-06 RX ORDER — SCOLOPAMINE TRANSDERMAL SYSTEM 1 MG/1
1 PATCH, EXTENDED RELEASE TRANSDERMAL
Status: DISCONTINUED | OUTPATIENT
Start: 2021-12-06 | End: 2021-12-07 | Stop reason: HOSPADM

## 2021-12-06 RX ORDER — ACETAMINOPHEN 500 MG
1000 TABLET ORAL ONCE
Status: COMPLETED | OUTPATIENT
Start: 2021-12-06 | End: 2021-12-06

## 2021-12-06 RX ORDER — OXYCODONE HYDROCHLORIDE 5 MG/1
5 TABLET ORAL
Status: DISCONTINUED | OUTPATIENT
Start: 2021-12-06 | End: 2021-12-06

## 2021-12-06 RX ORDER — ACETAMINOPHEN 500 MG
1000 TABLET ORAL EVERY 6 HOURS PRN
Status: DISCONTINUED | OUTPATIENT
Start: 2021-12-12 | End: 2021-12-07 | Stop reason: HOSPADM

## 2021-12-06 RX ORDER — LIDOCAINE HYDROCHLORIDE 20 MG/ML
INJECTION, SOLUTION EPIDURAL; INFILTRATION; INTRACAUDAL; PERINEURAL PRN
Status: DISCONTINUED | OUTPATIENT
Start: 2021-12-06 | End: 2021-12-06 | Stop reason: SURG

## 2021-12-06 RX ORDER — OXYCODONE HCL 5 MG/5 ML
10 SOLUTION, ORAL ORAL
Status: COMPLETED | OUTPATIENT
Start: 2021-12-06 | End: 2021-12-06

## 2021-12-06 RX ORDER — IBUPROFEN 200 MG
800 TABLET ORAL 3 TIMES DAILY
Status: DISCONTINUED | OUTPATIENT
Start: 2021-12-06 | End: 2021-12-07 | Stop reason: HOSPADM

## 2021-12-06 RX ADMIN — SUGAMMADEX 200 MG: 100 INJECTION, SOLUTION INTRAVENOUS at 16:09

## 2021-12-06 RX ADMIN — ROPIVACAINE HYDROCHLORIDE 3 ML: 5 INJECTION, SOLUTION EPIDURAL; INFILTRATION; PERINEURAL at 15:15

## 2021-12-06 RX ADMIN — SODIUM CHLORIDE: 9 INJECTION, SOLUTION INTRAVENOUS at 16:45

## 2021-12-06 RX ADMIN — Medication 200 MCG: at 15:35

## 2021-12-06 RX ADMIN — LIDOCAINE HYDROCHLORIDE 100 MG: 20 INJECTION, SOLUTION EPIDURAL; INFILTRATION; INTRACAUDAL at 12:21

## 2021-12-06 RX ADMIN — HYDROCHLOROTHIAZIDE 12.5 MG: 12.5 TABLET ORAL at 19:30

## 2021-12-06 RX ADMIN — ROCURONIUM BROMIDE 20 MG: 10 INJECTION, SOLUTION INTRAVENOUS at 13:15

## 2021-12-06 RX ADMIN — ONDANSETRON 4 MG: 2 INJECTION INTRAMUSCULAR; INTRAVENOUS at 12:03

## 2021-12-06 RX ADMIN — CEFAZOLIN 2 G: 330 INJECTION, POWDER, FOR SOLUTION INTRAMUSCULAR; INTRAVENOUS at 15:55

## 2021-12-06 RX ADMIN — ACETAMINOPHEN 1000 MG: 500 TABLET ORAL at 16:44

## 2021-12-06 RX ADMIN — LIDOCAINE HYDROCHLORIDE 100 MG: 20 INJECTION, SOLUTION EPIDURAL; INFILTRATION; INTRACAUDAL at 16:09

## 2021-12-06 RX ADMIN — EPHEDRINE SULFATE 10 MG: 50 INJECTION, SOLUTION INTRAVENOUS at 12:40

## 2021-12-06 RX ADMIN — GABAPENTIN 300 MG: 300 CAPSULE ORAL at 11:20

## 2021-12-06 RX ADMIN — Medication 100 MCG: at 14:25

## 2021-12-06 RX ADMIN — Medication 100 MCG: at 14:00

## 2021-12-06 RX ADMIN — ROCURONIUM BROMIDE 50 MG: 10 INJECTION, SOLUTION INTRAVENOUS at 12:21

## 2021-12-06 RX ADMIN — ROPIVACAINE HYDROCHLORIDE 5 ML: 5 INJECTION, SOLUTION EPIDURAL; INFILTRATION; PERINEURAL at 13:43

## 2021-12-06 RX ADMIN — SODIUM CHLORIDE, POTASSIUM CHLORIDE, SODIUM LACTATE AND CALCIUM CHLORIDE: 600; 310; 30; 20 INJECTION, SOLUTION INTRAVENOUS at 13:29

## 2021-12-06 RX ADMIN — LIDOCAINE HYDROCHLORIDE,EPINEPHRINE BITARTRATE 5 ML: 15; .005 INJECTION, SOLUTION EPIDURAL; INFILTRATION; INTRACAUDAL; PERINEURAL at 12:05

## 2021-12-06 RX ADMIN — CELECOXIB 400 MG: 200 CAPSULE ORAL at 11:20

## 2021-12-06 RX ADMIN — FENTANYL CITRATE 100 MCG: 50 INJECTION, SOLUTION INTRAMUSCULAR; INTRAVENOUS at 12:03

## 2021-12-06 RX ADMIN — MIDAZOLAM HYDROCHLORIDE 2 MG: 1 INJECTION, SOLUTION INTRAMUSCULAR; INTRAVENOUS at 12:03

## 2021-12-06 RX ADMIN — IBUPROFEN 800 MG: 200 TABLET, FILM COATED ORAL at 19:31

## 2021-12-06 RX ADMIN — ACETAMINOPHEN 1000 MG: 500 TABLET ORAL at 11:20

## 2021-12-06 RX ADMIN — Medication 200 MCG: at 15:55

## 2021-12-06 RX ADMIN — HYDROMORPHONE HYDROCHLORIDE 6 ML/HR: 1 INJECTION, SOLUTION INTRAMUSCULAR; INTRAVENOUS; SUBCUTANEOUS at 13:05

## 2021-12-06 RX ADMIN — Medication 100 MCG: at 14:33

## 2021-12-06 RX ADMIN — CEFAZOLIN 3 G: 330 INJECTION, POWDER, FOR SOLUTION INTRAMUSCULAR; INTRAVENOUS at 12:03

## 2021-12-06 RX ADMIN — METHOCARBAMOL 500 MG: 500 TABLET ORAL at 19:30

## 2021-12-06 RX ADMIN — SODIUM CHLORIDE, POTASSIUM CHLORIDE, SODIUM LACTATE AND CALCIUM CHLORIDE: 600; 310; 30; 20 INJECTION, SOLUTION INTRAVENOUS at 11:29

## 2021-12-06 RX ADMIN — SODIUM CHLORIDE, POTASSIUM CHLORIDE, SODIUM LACTATE AND CALCIUM CHLORIDE: 600; 310; 30; 20 INJECTION, SOLUTION INTRAVENOUS at 15:27

## 2021-12-06 RX ADMIN — OXYCODONE HYDROCHLORIDE 10 MG: 5 SOLUTION ORAL at 16:44

## 2021-12-06 RX ADMIN — DEXAMETHASONE SODIUM PHOSPHATE 4 MG: 4 INJECTION, SOLUTION INTRA-ARTICULAR; INTRALESIONAL; INTRAMUSCULAR; INTRAVENOUS; SOFT TISSUE at 12:03

## 2021-12-06 RX ADMIN — PROPOFOL 150 MG: 10 INJECTION, EMULSION INTRAVENOUS at 12:21

## 2021-12-06 RX ADMIN — EPHEDRINE SULFATE 10 MG: 50 INJECTION, SOLUTION INTRAVENOUS at 13:51

## 2021-12-06 RX ADMIN — ROCURONIUM BROMIDE 20 MG: 10 INJECTION, SOLUTION INTRAVENOUS at 13:43

## 2021-12-06 RX ADMIN — ROPIVACAINE HYDROCHLORIDE 5 ML: 5 INJECTION, SOLUTION EPIDURAL; INFILTRATION; PERINEURAL at 13:27

## 2021-12-06 RX ADMIN — EPHEDRINE SULFATE 10 MG: 50 INJECTION, SOLUTION INTRAVENOUS at 14:35

## 2021-12-06 RX ADMIN — LISINOPRIL 20 MG: 20 TABLET ORAL at 21:53

## 2021-12-06 RX ADMIN — ROCURONIUM BROMIDE 20 MG: 10 INJECTION, SOLUTION INTRAVENOUS at 15:51

## 2021-12-06 RX ADMIN — Medication 100 MCG: at 14:01

## 2021-12-06 RX ADMIN — OMEPRAZOLE 20 MG: 20 CAPSULE, DELAYED RELEASE ORAL at 19:31

## 2021-12-06 RX ADMIN — LIDOCAINE HYDROCHLORIDE 0.5 ML: 10 INJECTION, SOLUTION EPIDURAL; INFILTRATION; INTRACAUDAL; PERINEURAL at 11:29

## 2021-12-06 RX ADMIN — ROCURONIUM BROMIDE 20 MG: 10 INJECTION, SOLUTION INTRAVENOUS at 14:53

## 2021-12-06 RX ADMIN — EPHEDRINE SULFATE 10 MG: 50 INJECTION, SOLUTION INTRAVENOUS at 14:00

## 2021-12-06 NOTE — ANESTHESIA PREPROCEDURE EVALUATION
Case: [id number] Date/Time: 12/06/21 1145    Procedure: REPAIR, HERNIA, VENTRAL, ROBOT-ASSISTED, USING DA SHERRY XI - TRANSVERSUS ABDOMINIS RELEASE RECURRENT, WITH MESH (Bilateral )    Pre-op diagnosis: RECURRENT INCISIONAL HERNIA    Location: [facility] OR 11 / SURGERY [facility]    Surgeons: Billy Berrios M.D.        HTN    Relevant Problems      (positive) JAGJIT (acute kidney injury) (HCC)      Other   (positive) Hyperglycemia   (positive) Incarcerated ventral hernia   (positive) Obesity   (positive) Polycythemia       Physical Exam    Airway   Mallampati: II  TM distance: >3 FB  Neck ROM: full       Cardiovascular - normal exam  Rhythm: regular  Rate: normal  (-) murmur     Dental - normal exam           Pulmonary - normal exam  Breath sounds clear to auscultation     Abdominal    Neurological - normal exam                 Anesthesia Plan    ASA 2       Plan - general and epidural   Neuraxial block will be post-op pain control    Airway plan will be ETT          Induction: intravenous      Pertinent diagnostic labs and testing reviewed    Informed Consent:    Anesthetic plan and risks discussed with patient.

## 2021-12-06 NOTE — ANESTHESIA PROCEDURE NOTES
Airway    Date/Time: 12/6/2021 12:24 PM  Performed by: Giancarlo Perez M.D.  Authorized by: Giancarlo Perez M.D.     Location:  OR  Urgency:  Elective  Indications for Airway Management:  Anesthesia      Spontaneous Ventilation: absent    Sedation Level:  Deep  Preoxygenated: Yes    Patient Position:  Sniffing  Mask Difficulty Assessment:  0 - not attempted  Final Airway Type:  Endotracheal airway  Final Endotracheal Airway:  ETT  Cuffed: Yes    Technique Used for Successful ETT Placement:  Video laryngoscopy    Insertion Site:  Oral  Blade Type:  Glide  Laryngoscope Blade/Videolaryngoscope Blade Size:  4  ETT Size (mm):  8.0  Measured from:  Teeth  ETT to Teeth (cm):  23  Placement Verified by: auscultation and capnometry    Cormack-Lehane Classification:  Grade I - full view of glottis  Number of Attempts at Approach:  1

## 2021-12-06 NOTE — ANESTHESIA PROCEDURE NOTES
Epidural Block    Date/Time: 12/6/2021 12:05 PM  Performed by: Giancarlo Perez M.D.  Authorized by: Giancarlo Perez M.D.     Patient Location:  OR  Start Time:  12/6/2021 12:05 PM  Reason for Block: at surgeon's request and post-op pain management    patient identified, IV checked, site marked, risks and benefits discussed, surgical consent, monitors and equipment checked, pre-op evaluation and timeout performed    Patient Position:  Left lateral decubitus  Prep: ChloraPrep, patient draped and sterile technique    Monitoring:  Blood pressure, continuous pulse oximetry and heart rate  Approach:  Left paramedian  Location:  T8-T9  Injection Technique:  MARK saline  Skin infiltration:  Lidocaine  Strength:  1%  Dose:  5ml  Needle Type:  Tuohy  Needle Gauge:  17 G  Needle Length:  3.5 in  Loss of resistance::  9  Catheter Size:  19 G  Catheter at Skin Depth:  15  Test Dose Result:  Negative

## 2021-12-07 ENCOUNTER — ANESTHESIA EVENT (OUTPATIENT)
Dept: ANESTHESIOLOGY | Facility: MEDICAL CENTER | Age: 56
End: 2021-12-07

## 2021-12-07 ENCOUNTER — ANESTHESIA (OUTPATIENT)
Dept: ANESTHESIOLOGY | Facility: MEDICAL CENTER | Age: 56
End: 2021-12-07

## 2021-12-07 VITALS
DIASTOLIC BLOOD PRESSURE: 69 MMHG | RESPIRATION RATE: 16 BRPM | OXYGEN SATURATION: 93 % | HEART RATE: 87 BPM | TEMPERATURE: 97.3 F | BODY MASS INDEX: 36.11 KG/M2 | SYSTOLIC BLOOD PRESSURE: 114 MMHG | HEIGHT: 71 IN | WEIGHT: 257.94 LBS

## 2021-12-07 PROCEDURE — 700102 HCHG RX REV CODE 250 W/ 637 OVERRIDE(OP): Performed by: SURGERY

## 2021-12-07 PROCEDURE — 700102 HCHG RX REV CODE 250 W/ 637 OVERRIDE(OP): Performed by: ANESTHESIOLOGY

## 2021-12-07 PROCEDURE — 700111 HCHG RX REV CODE 636 W/ 250 OVERRIDE (IP): Performed by: ANESTHESIOLOGY

## 2021-12-07 PROCEDURE — A9270 NON-COVERED ITEM OR SERVICE: HCPCS | Performed by: ANESTHESIOLOGY

## 2021-12-07 PROCEDURE — A9270 NON-COVERED ITEM OR SERVICE: HCPCS | Performed by: SURGERY

## 2021-12-07 RX ORDER — OXYCODONE HYDROCHLORIDE 5 MG/1
5 TABLET ORAL ONCE
Status: COMPLETED | OUTPATIENT
Start: 2021-12-07 | End: 2021-12-07

## 2021-12-07 RX ORDER — METHOCARBAMOL 500 MG/1
500 TABLET, FILM COATED ORAL 3 TIMES DAILY
Qty: 30 TABLET | Refills: 3 | Status: SHIPPED | OUTPATIENT
Start: 2021-12-07

## 2021-12-07 RX ORDER — OXYCODONE HYDROCHLORIDE 5 MG/1
5 TABLET ORAL EVERY 4 HOURS PRN
Qty: 25 TABLET | Refills: 0 | Status: SHIPPED | OUTPATIENT
Start: 2021-12-07 | End: 2021-12-14

## 2021-12-07 RX ADMIN — ACETAMINOPHEN 1000 MG: 500 TABLET ORAL at 06:06

## 2021-12-07 RX ADMIN — ACETAMINOPHEN 1000 MG: 500 TABLET ORAL at 00:25

## 2021-12-07 RX ADMIN — OMEPRAZOLE 20 MG: 20 CAPSULE, DELAYED RELEASE ORAL at 06:05

## 2021-12-07 RX ADMIN — METHOCARBAMOL 500 MG: 500 TABLET ORAL at 11:12

## 2021-12-07 RX ADMIN — OXYCODONE 5 MG: 5 TABLET ORAL at 11:12

## 2021-12-07 RX ADMIN — ACETAMINOPHEN 1000 MG: 500 TABLET ORAL at 11:10

## 2021-12-07 RX ADMIN — HYDROCHLOROTHIAZIDE 12.5 MG: 12.5 TABLET ORAL at 06:05

## 2021-12-07 RX ADMIN — DOCUSATE SODIUM 100 MG: 100 CAPSULE ORAL at 06:05

## 2021-12-07 RX ADMIN — DIPHENHYDRAMINE HYDROCHLORIDE 12.5 MG: 25 TABLET ORAL at 11:21

## 2021-12-07 RX ADMIN — DIPHENHYDRAMINE HYDROCHLORIDE 12.5 MG: 50 INJECTION INTRAMUSCULAR; INTRAVENOUS at 03:07

## 2021-12-07 RX ADMIN — LISINOPRIL 20 MG: 20 TABLET ORAL at 06:06

## 2021-12-07 RX ADMIN — METHOCARBAMOL 500 MG: 500 TABLET ORAL at 06:06

## 2021-12-07 RX ADMIN — IBUPROFEN 800 MG: 200 TABLET, FILM COATED ORAL at 11:09

## 2021-12-07 RX ADMIN — IBUPROFEN 800 MG: 200 TABLET, FILM COATED ORAL at 06:05

## 2021-12-07 ASSESSMENT — PAIN DESCRIPTION - PAIN TYPE
TYPE: SURGICAL PAIN
TYPE: SURGICAL PAIN

## 2021-12-07 NOTE — DISCHARGE INSTRUCTIONS
ACTIVITY: Rest and take it easy for the first 24 hours.  A responsible adult is recommended to remain with you during that time.  It is normal to feel sleepy.  We encourage you to not do anything that requires balance, judgment or coordination.    MILD FLU-LIKE SYMPTOMS ARE NORMAL. YOU MAY EXPERIENCE GENERALIZED MUSCLE ACHES, THROAT IRRITATION, HEADACHE AND/OR SOME NAUSEA.    FOR 24 HOURS DO NOT:  Drive, operate machinery or run household appliances.  Drink beer or alcoholic beverages.   Make important decisions or sign legal documents.    SPECIAL INSTRUCTIONS: The pain medication is extremely constipating.    Take a stool softener and drink lots of water.  It also can be addicting.  Please try to transition to an over the counter pain remedy as soon as possible     2.   Alternating ice and heat for 30 minutes can greatly reduce your pain.     3.   It's ok to shower on the day after your surgery.   Do not scrub the incisions.     4.   After laparoscopy, it's common to have shoulder pain or pain when you take a deep breath.   If the pain radiates down your arm, call or present to the ER.     5.   Please call for redness or drainage from the wound sites that persists.     6.   Feel free to call with questions at [phone].   If you have paperwork that needs filling out, please contact us at this number. 7.   Follow up with me in 1-2 weeks for your postoperative exam.     8.   Activity restrictions vary according to the surgery.   If it hurts, don't do it.   You may begin light exercise at 7-10 days. Bilyl Berrios MD [facility] [phone] NOTICE: This order will  in 24 hours.      Surgical Drain Home Care  Surgical drains are used to remove extra fluid that normally builds up in a surgical wound after surgery. A surgical drain helps to heal a surgical wound. Different kinds of surgical drains include:  · Active drains. These drains use suction to pull drainage away from the surgical wound.  Drainage flows through a tube to a container outside of the body. With these drains, you need to keep the bulb or the drainage container flat (compressed) at all times, except while you empty it. Flattening the bulb or container creates suction.  · Passive drains. These drains allow fluid to drain naturally, by gravity. Drainage flows through a tube to a bandage (dressing) or a container outside of the body. Passive drains do not need to be emptied.  A drain is placed during surgery. Right after surgery, drainage is usually bright red and a little thicker than water. The drainage may gradually turn yellow or pink and become thinner. It is likely that your health care provider will remove the drain when the drainage stops or when the amount decreases to 1-2 Tbsp (15-30 mL) during a 24-hour period.  Supplies needed:  · Tape.  · Germ-free cleaning solution (sterile saline).  · Cotton swabs.  · Split gauze drain sponge: 4 x 4 inches (10 x 10 cm).  · Gauze square: 4 x 4 inches (10 x 10 cm).  How to care for your surgical drain  Care for your drain as told by your health care provider. This is important to help prevent infection. If your drain is placed at your back, or any other hard-to-reach area, ask another person to assist you in performing the following tasks:  General care  · Keep the skin around the drain dry and covered with a dressing at all times.  · Check your drain area every day for signs of infection. Check for:  ? Redness, swelling, or pain.  ? Pus or a bad smell.  ? Cloudy drainage.  ? Tenderness or pressure at the drain exit site.  Changing the dressing  Follow instructions from your health care provider about how to change your dressing. Change your dressing at least once a day. Change it more often if needed to keep the dressing dry. Make sure you:  1. Gather your supplies.  2. Wash your hands with soap and water before you change your dressing. If soap and water are not available, use hand  .  3. Remove the old dressing. Avoid using scissors to do that.  4. Wash your hands with soap and water again after removing the old dressing.  5. Use sterile saline to clean your skin around the drain. You may need to use a cotton swab to clean the skin.  6. Place the tube through the slit in a drain sponge. Place the drain sponge so that it covers your wound.  7. Place the gauze square or another drain sponge on top of the drain sponge that is on the wound. Make sure the tube is between those layers.  8. Tape the dressing to your skin.  9. Tape the drainage tube to your skin 1-2 inches (2.5-5 cm) below the place where the tube enters your body. Taping keeps the tube from pulling on any stitches (sutures) that you have.  10. Wash your hands with soap and water.  11. Write down the color of your drainage and how often you change your dressing.  How to empty your active drain    1. Make sure that you have a measuring cup that you can empty your drainage into.  2. Wash your hands with soap and water. If soap and water are not available, use hand .  3. Loosen any pins or clips that hold the tube in place.  4. If your health care provider tells you to strip the tube to prevent clots and tube blockages:  ? Hold the tube at the skin with one hand. Use your other hand to pinch the tubing with your thumb and first finger.  ? Gently move your fingers down the tube while squeezing very lightly. This clears any drainage, clots, or tissue from the tube.  ? You may need to do this several times each day to keep the tube clear. Do not pull on the tube.  5. Open the bulb cap or the drain plug. Do not touch the inside of the cap or the bottom of the plug.  6. Turn the device upside down and gently squeeze.  7. Empty all of the drainage into the measuring cup.  8. Compress the bulb or the container and replace the cap or the plug. To compress the bulb or the container, squeeze it firmly in the middle while you  close the cap or plug the container.  9. Write down the amount of drainage that you have in each 24-hour period. If you have less than 2 Tbsp (30 mL) of drainage during 24 hours, contact your health care provider.  10. Flush the drainage down the toilet.  11. Wash your hands with soap and water.  Contact a health care provider if:  · You have redness, swelling, or pain around your drain area.  · You have pus or a bad smell coming from your drain area.  · You have a fever or chills.  · The skin around your drain is warm to the touch.  · The amount of drainage that you have is increasing instead of decreasing.  · You have drainage that is cloudy.  · There is a sudden stop or a sudden decrease in the amount of drainage that you have.  · Your drain tube falls out.  · Your active drain does not stay compressed after you empty it.  Summary  · Surgical drains are used to remove extra fluid that normally builds up in a surgical wound after surgery.  · Different kinds of surgical drains include active drains and passive drains. Active drains use suction to pull drainage away from the surgical wound, and passive drains allow fluid to drain naturally.  · It is important to care for your drain to prevent infection. If your drain is placed at your back, or any other hard-to-reach area, ask another person to assist you.  · Contact your health care provider if you have redness, swelling, or pain around your drain area.  This information is not intended to replace advice given to you by your health care provider. Make sure you discuss any questions you have with your health care provider.  Document Released: 12/15/2001 Document Revised: 01/22/2020 Document Reviewed: 01/22/2020  [employer] Patient Education © 2020 [employer]      DIET: To avoid nausea, slowly advance diet as tolerated, avoiding spicy or greasy foods for the first day.  Add more substantial food to your diet according to your physician's instructions.  Babies can be  fed formula or breast milk as soon as they are hungry.  INCREASE FLUIDS AND FIBER TO AVOID CONSTIPATION.    SURGICAL DRESSING/BATHING: see above    FOLLOW-UP APPOINTMENT:  A follow-up appointment should be arranged with your doctor in 1-2 weeks; call to schedule.    You should CALL YOUR PHYSICIAN if you develop:  Fever greater than 101 degrees F.  Pain not relieved by medication, or persistent nausea or vomiting.  Excessive bleeding (blood soaking through dressing) or unexpected drainage from the wound.  Extreme redness or swelling around the incision site, drainage of pus or foul smelling drainage.  Inability to urinate or empty your bladder within 8 hours.  Problems with breathing or chest pain.    You should call 911 if you develop problems with breathing or chest pain.  If you are unable to contact your doctor or surgical center, you should go to the nearest emergency room or urgent care center.  Physician's telephone #: [phone]    If any questions arise, call your doctor.  If your doctor is not available, please feel free to call the Surgical Center at [phone].     A registered nurse may call you a few days after your surgery to see how you are doing after your procedure.    MEDICATIONS: Resume taking daily medication.  Take prescribed pain medication with food.  If no medication is prescribed, you may take non-aspirin pain medication if needed.  PAIN MEDICATION CAN BE VERY CONSTIPATING.  Take a stool softener or laxative such as senokot, pericolace, or milk of magnesia if needed.     Prescription given for roboxan, oxycodone .  Last pain medication given at 1100.    If your physician has prescribed pain medication that includes Acetaminophen (Tylenol), do not take additional Acetaminophen (Tylenol) while taking the prescribed medication.    Depression / Suicide Risk    As you are discharged from this [facility] facility, it is important to learn how to keep safe from harming yourself.    Recognize  the warning signs:  · Abrupt changes in personality, positive or negative- including increase in energy   · Giving away possessions  · Change in eating patterns- significant weight changes-  positive or negative  · Change in sleeping patterns- unable to sleep or sleeping all the time   · Unwillingness or inability to communicate  · Depression  · Unusual sadness, discouragement and loneliness  · Talk of wanting to die  · Neglect of personal appearance   · Rebelliousness- reckless behavior  · Withdrawal from people/activities they love  · Confusion- inability to concentrate     If you or a loved one observes any of these behaviors or has concerns about self-harm, here's what you can do:  · Talk about it- your feelings and reasons for harming yourself  · Remove any means that you might use to hurt yourself (examples: pills, rope, extension cords, firearm)  · Get professional help from the community (Mental Health, Substance Abuse, psychological counseling)  · Do not be alone:Call your Safe Contact- someone whom you trust who will be there for you.  · Call your local CRISIS HOTLINE 339-7012 or 824-662-7138  · Call your local Children's Mobile Crisis Response Team [place] (588) 078-2821 or www.[employer]  · Call the toll free National Suicide Prevention Hotlines   · National Suicide Prevention Lifeline 448-590-YLKH (6614)  · National Hope Line Network 800-SUICIDE (599-3207)        Surgical Drain Record  Empty your surgical drain as told by your health care provider. Use this form to write down the amount of fluid that has collected in the drainage container. Bring this form with you to your follow-up visits.  Surgical drain #1 location: ___________________    Date __________ Time __________ Amount __________  Date __________ Time __________ Amount __________  Date __________ Time __________ Amount __________  Date __________ Time __________ Amount __________  Date __________ Time __________ Amount  __________  Date __________ Time __________ Amount __________  Date __________ Time __________ Amount __________  Date __________ Time __________ Amount __________  Date __________ Time __________ Amount __________  Date __________ Time __________ Amount __________  Date __________ Time __________ Amount __________  Date __________ Time __________ Amount __________  Date __________ Time __________ Amount __________  Date __________ Time __________ Amount __________  Date __________ Time __________ Amount __________  Date __________ Time __________ Amount __________  Date __________ Time __________ Amount __________  Date __________ Time __________ Amount __________  Date __________ Time __________ Amount __________  Date __________ Time __________ Amount __________  Date __________ Time __________ Amount __________  Surgical drain #2 location: ___________________  Date __________ Time __________ Amount __________  Date __________ Time __________ Amount __________  Date __________ Time __________ Amount __________  Date __________ Time __________ Amount __________  Date __________ Time __________ Amount __________  Date __________ Time __________ Amount __________  Date __________ Time __________ Amount __________  Date __________ Time __________ Amount __________  Date __________ Time __________ Amount __________  Date __________ Time __________ Amount __________  Date __________ Time __________ Amount __________  Date __________ Time __________ Amount __________  Date __________ Time __________ Amount __________  Date __________ Time __________ Amount __________  Date __________ Time __________ Amount __________  Date __________ Time __________ Amount __________  Date __________ Time __________ Amount __________  Date __________ Time __________ Amount __________  Date __________ Time __________ Amount __________  Date __________ Time __________ Amount __________  Date __________ Time __________ Amount  __________  This information is not intended to replace advice given to you by your health care provider. Make sure you discuss any questions you have with your health care provider.  Document Released: 09/24/2018 Document Revised: 09/24/2018 Document Reviewed: 09/24/2018  Elsevier Patient Education © 2020 [employer]

## 2021-12-07 NOTE — OR NURSING
1621: Pt arrived from OR post BL. Pt is awake; easily reoriented. BL abdominal sights x 3 (6 total) are CDI. One midline island dressing is also CDI; abdominal binder in place. Cardiac rhythm appears to be ST; rate 114. Pt denies nausea; pt reports some pain; RN educated pt on how to utilize epidural PCA; pt demonstrates understanding.    1715: Belongings returned. Pt updated friend, declines to have RN call and update at this time. PACU criteria met. Awaiting room    1908: Charge RN reports pt will stay in PACU overnight. Pt notified and reports he will update friend.     1950: Paged Dr. Berrios; pt has had some drainage to midline abdominal sight, but it has now saturated the midline abdominal dressing and on to the abdominal binder. Reinforced with gauze 4x4's under abdominal binder. Abdomen feels soft; pt denies any pain.       2000: Spoke with Dr. Berrios; orders received to change dressing and add and ABD pad under abdominal binder. Dressing changed per order.     1936: Report to WILLAM Montanez.

## 2021-12-07 NOTE — OR NURSING
0730: Report received from Vandana QUARLES. Pt resting in bed and has no needs at this time. VSS. Epidural infusing, pt denies pain and nausea at this time.     0800: pt assessment complete. Pt dressing to abd are intact with old drainage present. Drainage has not increased in size. Abdominal binder replaced. Pt able to move legs and has no loss of sensation from epidural.     1000: MD Berrios called and updated on pt status. Verbal order to remove epidural catheter as well as urinary catheter. MD Byers to bedside to remove epidural. Pt tolerated well. This RN dc'd baker and pt tolerated well. Pt ambulated around unit without issue. Pt denies pain and nausea at this time.     1037: MD Berrios to bedside to assess pt. Redressed island dressing on midline incision. All discharge instructions discussed with pt. Pt verbalizes understanding. MD to put in all orders.     1120: medicated pt for mild pain. Report called to phase 2 WILLAM Ventura. Pt taken to stage 2 in stable condition.

## 2021-12-07 NOTE — ANESTHESIA POSTPROCEDURE EVALUATION
Patient: Billy Stephenson    Procedure Summary     Date: 12/06/21 Room / Location: [place] [address] / SURGERY [facility]    Anesthesia Start: 1155 Anesthesia Stop: 1626    Procedure: REPAIR, HERNIA, VENTRAL, ROBOT-ASSISTED, USING DA SHERYR XI - TRANSVERSUS ABDOMINIS RELEASE RECURRENT, WITH MESH (Bilateral Abdomen) Diagnosis: (RECURRENT INCISIONAL HERNIA)    Surgeons: Billy Berrios M.D. Responsible Provider: Giancarlo Perez M.D.    Anesthesia Type: general, epidural ASA Status: 2          Final Anesthesia Type: general, epidural  Last vitals  BP   Blood Pressure: 159/110    Temp   36.2 °C (97.1 °F)    Pulse   79   Resp   17    SpO2   97 %      Anesthesia Post Evaluation    Patient location during evaluation: PACU  Patient participation: complete - patient participated  Level of consciousness: awake and alert    Airway patency: patent  Anesthetic complications: no  Cardiovascular status: hemodynamically stable  Respiratory status: acceptable  Hydration status: euvolemic    PONV: none          No complications documented.     Nurse Pain Score: 0 (NPRS)

## 2021-12-07 NOTE — PROGRESS NOTES
Pt reports pain controlled, kristine drain teaching done. Denies nausea  Vss, pt aware of follow up in.   Safe to dc home

## 2021-12-07 NOTE — ANESTHESIA TIME REPORT
Anesthesia Start and Stop Event Times     Date Time Event    12/6/2021 1144 Ready for Procedure     1155 Anesthesia Start     1626 Anesthesia Stop        Responsible Staff  12/06/21    Name Role Begin End    Giancarlo Perez M.D. Anesth 1155 1626        Preop Diagnosis (Free Text):  Pre-op Diagnosis     RECURRENT INCISIONAL HERNIA        Preop Diagnosis (Codes):    Premium Reason  A. 3PM - 7AM    Comments:

## 2021-12-07 NOTE — OP REPORT
POST-OPERATIVE NOTE    PREOPERATIVE DIAGNOSIS: Recurrent massive incisional hernia    POSTOPERATIVE DIAGNOSIS: Same    PROCEDURE PERFORMED: 1.  Robotic assisted repair of a recurrent ventral incisional hernia   2.  Myocutaneous flap trunk x4    SURGEON: Billy Berrios M.D.    ASSISTANT: MD Veronica    ANESTHESIOLOGIST:  Anesthesiologist: Giancarlo Perez M.D.     ANESTHESIA: general, epidural     FINDINGS: Massive recurrence with multiple prior meshes.     WOUND CLASS: Clean    SPECIMEN: None    ESTIMATED BLOOD LOSS: 100 mL    Indications: Patient is a 56-year-old male status post multiple incisional hernia repairs who presented to the hospital with a bowel obstruction several months ago.  Patient continued to lose weight.  Patient was counseled extensively as of the risk first benefits of surgery and agreed to proceed fully informed.    Description:    The patient was prepped and draped in the standard sterile surgical fashion after induction of general anesthesia.  An appropriate timeout was performed and antibiotics delivered.  A Gomes catheter was placed.  I began with a Veress insertion in the left upper quadrant.  The abdomen was insufflated to 15 mmHg of CO2.  A 5 mm Visiport was applied.  The abdomen was examined and while there were adhesions it appeared that we could proceed laparoscopically.   I then applied 3 robotic trochars under direct visualization.  The robot was then docked and I took my position at the console.    I began with an extensive lysis of adhesions.  I was very careful not to use cautery near the intestine.  I did reduce his hernia which contained copious amounts of small intestine.  Once I had completely lysed the entire abdominal cavity I proceeded with the component separation.  I began by entering the rectus sheath on the contralateral side and freed the posterior rectus sheath cranially to caudally (myocutaneous flap #1).  I took this down below the arcuate line.  I took care to  avoid the perforating vessels and nerves laterally.  I did go through a piece of his prior mesh.  His rectus was compromised near the hernia.      Once this was accomplished I performed a transverse abdominis release.  (myocutaneous flap #2) I combined a top-down and bottoms up technique for the transversus abdominis release.  I brought this across the midline inferiorly.  I brought this across the xiphoid superiorly.  The posterior fascia now lay flat against the intestines.  I was careful to preserve the linea semilunaris.  Once this was accomplished I closed defects created during the dissection and from prior surgery using 2-0 Vicryl.    I then redocked the robot after placing trochars on the contralateral side under direct visualization.  A similar dissection was performed to include a contralateral transversus abdominis release and rectus sheath mobilization (myocutaneous flaps #3 and #4) .  The fascia at this site also lay flat against the abdominal wall at this point.  I then used to 2-0 Vicryl sutures to fix any rents made in the fascia or peritoneum on the posterior layer.  I then used 4x23 cm 2 0 Stratus fix reapproximate the posterior elements in the midline.  This was done without tension and completely hid the abdominal contents from the retrorectus space.      Once that was accomplished I elected to perform a scar excision.  I made an elliptical incision with a 10 blade scalpel and excised his hernia sac with Bovie cautery.  At that point hemostasis was achieved.  I placed a drain through one of the port sites into the retrorectus space.  I did use Kathleen as a hemostatic adjunct.  I measured the retrorectus space and placed a 30 x 30 cm Bard soft macro porous mesh.  I then used running 0 PDS suture to reapproximate the fascia in the midline bringing the rectus complexes together in the midline staples were used for skin edges.  A nylon was used to secure the drain..  Appropriate dry sterile dressings  were applied.    The sponge management count was correct.  The patient was then extubated, to recovery in satisfactory condition.          ____________________________________     Billy Berrios M.D.    DT: 12/6/2021  4:13 PM      Cc: Claudia Sepulveda P.A.-C.

## 2021-12-07 NOTE — ANESTHESIA POST-OP FOLLOW-UP NOTE
"Anesthesia Pain Service Note    Type:  Epidural catheter    Patient: Billy Stephenson    Patient seen and examined. and Patient chart reviewed.     BP (!) 94/69   Pulse 91   Temp 36.8 °C (98.3 °F) (Temporal)   Resp 16   Ht 1.803 m (5' 11\")   Wt 117 kg (257 lb 15 oz)   SpO2 95%   BMI 35.98 kg/m²     Complaints:  pruritis and no pain at all    Pain:   0/10    LOC:   Awake    Rate:  6cc/hr    Exam:  Vital signs stable, Afebrile and Site clean, dry, intact without tenderness or erythema    Impression:  Adequate analgesia and itching    Assessment & Plan:  Acute post-procedural pain (G89.18)     Remove epidural today as patient is being discharged.  Epidural pulled, tip intact    Carly Byers M.D.  12/7/2021  9:21 AM  "

## 2022-01-17 ENCOUNTER — PRE-ADMISSION TESTING (OUTPATIENT)
Dept: ADMISSIONS | Facility: MEDICAL CENTER | Age: 57
End: 2022-01-17
Attending: SURGERY
Payer: COMMERCIAL

## 2022-01-17 RX ORDER — ACETAMINOPHEN 500 MG
1000 TABLET ORAL EVERY 6 HOURS PRN
COMMUNITY

## 2022-01-17 NOTE — DISCHARGE PLANNING
Call received from Krissy at [facility], she informed this CM that pt has a referral for wound vac dressing changes. She left patient a  on 1/14/22 to call their office to schedule appointment.

## 2022-01-17 NOTE — DISCHARGE PLANNING
10:10 LVM for Michelle at [facility] regarding upcoming wound vac placement. Also left message at [facility] wound care clinic to verify that patient has an active referral for wound care dressing changes.   11:15 Call received from Michelle at [facility]. She states order was received but office has not faxed an H/P or patient demographics. Michelle informed that they are available and this CM can fax them to her office. Fax completed and confirmation received. Per Michelle wound vac will be delivered today by 17:00.

## 2022-01-18 ENCOUNTER — HOSPITAL ENCOUNTER (OUTPATIENT)
Facility: MEDICAL CENTER | Age: 57
End: 2022-01-18
Attending: SURGERY | Admitting: SURGERY
Payer: COMMERCIAL

## 2022-01-18 ENCOUNTER — ANESTHESIA (OUTPATIENT)
Dept: SURGERY | Facility: MEDICAL CENTER | Age: 57
End: 2022-01-18
Payer: COMMERCIAL

## 2022-01-18 ENCOUNTER — ANESTHESIA EVENT (OUTPATIENT)
Dept: SURGERY | Facility: MEDICAL CENTER | Age: 57
End: 2022-01-18
Payer: COMMERCIAL

## 2022-01-18 VITALS
OXYGEN SATURATION: 93 % | RESPIRATION RATE: 15 BRPM | HEIGHT: 71 IN | TEMPERATURE: 97.8 F | BODY MASS INDEX: 34.75 KG/M2 | DIASTOLIC BLOOD PRESSURE: 80 MMHG | WEIGHT: 248.24 LBS | SYSTOLIC BLOOD PRESSURE: 141 MMHG | HEART RATE: 76 BPM

## 2022-01-18 DIAGNOSIS — G89.18 POST-OP PAIN: ICD-10-CM

## 2022-01-18 LAB
ANION GAP SERPL CALC-SCNC: 12 MMOL/L (ref 7–16)
BUN SERPL-MCNC: 16 MG/DL (ref 8–22)
CALCIUM SERPL-MCNC: 9.3 MG/DL (ref 8.5–10.5)
CHLORIDE SERPL-SCNC: 107 MMOL/L (ref 96–112)
CO2 SERPL-SCNC: 24 MMOL/L (ref 20–33)
CREAT SERPL-MCNC: 1.09 MG/DL (ref 0.5–1.4)
EXTERNAL QUALITY CONTROL: NORMAL
GLUCOSE SERPL-MCNC: 107 MG/DL (ref 65–99)
POTASSIUM SERPL-SCNC: 4.2 MMOL/L (ref 3.6–5.5)
SARS-COV+SARS-COV-2 AG RESP QL IA.RAPID: NEGATIVE
SODIUM SERPL-SCNC: 143 MMOL/L (ref 135–145)

## 2022-01-18 PROCEDURE — 36415 COLL VENOUS BLD VENIPUNCTURE: CPT

## 2022-01-18 PROCEDURE — 160039 HCHG SURGERY MINUTES - EA ADDL 1 MIN LEVEL 3: Performed by: SURGERY

## 2022-01-18 PROCEDURE — 160046 HCHG PACU - 1ST 60 MINS PHASE II: Performed by: SURGERY

## 2022-01-18 PROCEDURE — 87426 SARSCOV CORONAVIRUS AG IA: CPT | Performed by: SURGERY

## 2022-01-18 PROCEDURE — 160002 HCHG RECOVERY MINUTES (STAT): Performed by: SURGERY

## 2022-01-18 PROCEDURE — A9270 NON-COVERED ITEM OR SERVICE: HCPCS | Performed by: ANESTHESIOLOGY

## 2022-01-18 PROCEDURE — 160028 HCHG SURGERY MINUTES - 1ST 30 MINS LEVEL 3: Performed by: SURGERY

## 2022-01-18 PROCEDURE — 501838 HCHG SUTURE GENERAL: Performed by: SURGERY

## 2022-01-18 PROCEDURE — 700101 HCHG RX REV CODE 250: Performed by: ANESTHESIOLOGY

## 2022-01-18 PROCEDURE — 500064 HCHG BINDER, 4-PANEL MED/LG: Performed by: SURGERY

## 2022-01-18 PROCEDURE — 160025 RECOVERY II MINUTES (STATS): Performed by: SURGERY

## 2022-01-18 PROCEDURE — 160009 HCHG ANES TIME/MIN: Performed by: SURGERY

## 2022-01-18 PROCEDURE — 700102 HCHG RX REV CODE 250 W/ 637 OVERRIDE(OP): Performed by: ANESTHESIOLOGY

## 2022-01-18 PROCEDURE — 80048 BASIC METABOLIC PNL TOTAL CA: CPT

## 2022-01-18 PROCEDURE — 160048 HCHG OR STATISTICAL LEVEL 1-5: Performed by: SURGERY

## 2022-01-18 PROCEDURE — 700111 HCHG RX REV CODE 636 W/ 250 OVERRIDE (IP): Performed by: ANESTHESIOLOGY

## 2022-01-18 PROCEDURE — 160035 HCHG PACU - 1ST 60 MINS PHASE I: Performed by: SURGERY

## 2022-01-18 PROCEDURE — 700105 HCHG RX REV CODE 258: Performed by: SURGERY

## 2022-01-18 RX ORDER — SODIUM CHLORIDE, SODIUM LACTATE, POTASSIUM CHLORIDE, CALCIUM CHLORIDE 600; 310; 30; 20 MG/100ML; MG/100ML; MG/100ML; MG/100ML
INJECTION, SOLUTION INTRAVENOUS CONTINUOUS
Status: ACTIVE | OUTPATIENT
Start: 2022-01-18 | End: 2022-01-18

## 2022-01-18 RX ORDER — MEPERIDINE HYDROCHLORIDE 25 MG/ML
6.25 INJECTION INTRAMUSCULAR; INTRAVENOUS; SUBCUTANEOUS
Status: DISCONTINUED | OUTPATIENT
Start: 2022-01-18 | End: 2022-01-18 | Stop reason: HOSPADM

## 2022-01-18 RX ORDER — SODIUM CHLORIDE, SODIUM LACTATE, POTASSIUM CHLORIDE, CALCIUM CHLORIDE 600; 310; 30; 20 MG/100ML; MG/100ML; MG/100ML; MG/100ML
INJECTION, SOLUTION INTRAVENOUS CONTINUOUS
Status: DISCONTINUED | OUTPATIENT
Start: 2022-01-18 | End: 2022-01-18 | Stop reason: HOSPADM

## 2022-01-18 RX ORDER — ONDANSETRON 2 MG/ML
4 INJECTION INTRAMUSCULAR; INTRAVENOUS
Status: DISCONTINUED | OUTPATIENT
Start: 2022-01-18 | End: 2022-01-18 | Stop reason: HOSPADM

## 2022-01-18 RX ORDER — OXYCODONE HYDROCHLORIDE AND ACETAMINOPHEN 5; 325 MG/1; MG/1
1 TABLET ORAL
Status: COMPLETED | OUTPATIENT
Start: 2022-01-18 | End: 2022-01-18

## 2022-01-18 RX ORDER — DEXAMETHASONE SODIUM PHOSPHATE 4 MG/ML
INJECTION, SOLUTION INTRA-ARTICULAR; INTRALESIONAL; INTRAMUSCULAR; INTRAVENOUS; SOFT TISSUE PRN
Status: DISCONTINUED | OUTPATIENT
Start: 2022-01-18 | End: 2022-01-18 | Stop reason: SURG

## 2022-01-18 RX ORDER — DIPHENHYDRAMINE HYDROCHLORIDE 50 MG/ML
12.5 INJECTION INTRAMUSCULAR; INTRAVENOUS
Status: DISCONTINUED | OUTPATIENT
Start: 2022-01-18 | End: 2022-01-18 | Stop reason: HOSPADM

## 2022-01-18 RX ORDER — OXYCODONE HYDROCHLORIDE 5 MG/1
5 TABLET ORAL EVERY 4 HOURS PRN
Qty: 40 TABLET | Refills: 0 | Status: SHIPPED | OUTPATIENT
Start: 2022-01-18 | End: 2022-01-28

## 2022-01-18 RX ORDER — HALOPERIDOL 5 MG/ML
INJECTION INTRAMUSCULAR PRN
Status: DISCONTINUED | OUTPATIENT
Start: 2022-01-18 | End: 2022-01-18 | Stop reason: SURG

## 2022-01-18 RX ORDER — CEFAZOLIN SODIUM 1 G/3ML
INJECTION, POWDER, FOR SOLUTION INTRAMUSCULAR; INTRAVENOUS PRN
Status: DISCONTINUED | OUTPATIENT
Start: 2022-01-18 | End: 2022-01-18 | Stop reason: SURG

## 2022-01-18 RX ORDER — ONDANSETRON 2 MG/ML
INJECTION INTRAMUSCULAR; INTRAVENOUS PRN
Status: DISCONTINUED | OUTPATIENT
Start: 2022-01-18 | End: 2022-01-18 | Stop reason: SURG

## 2022-01-18 RX ORDER — HYDRALAZINE HYDROCHLORIDE 20 MG/ML
5 INJECTION INTRAMUSCULAR; INTRAVENOUS
Status: DISCONTINUED | OUTPATIENT
Start: 2022-01-18 | End: 2022-01-18 | Stop reason: HOSPADM

## 2022-01-18 RX ORDER — KETOROLAC TROMETHAMINE 30 MG/ML
INJECTION, SOLUTION INTRAMUSCULAR; INTRAVENOUS PRN
Status: DISCONTINUED | OUTPATIENT
Start: 2022-01-18 | End: 2022-01-18 | Stop reason: SURG

## 2022-01-18 RX ORDER — MIDAZOLAM HYDROCHLORIDE 1 MG/ML
1 INJECTION INTRAMUSCULAR; INTRAVENOUS
Status: DISCONTINUED | OUTPATIENT
Start: 2022-01-18 | End: 2022-01-18 | Stop reason: HOSPADM

## 2022-01-18 RX ORDER — HALOPERIDOL 5 MG/ML
1 INJECTION INTRAMUSCULAR
Status: DISCONTINUED | OUTPATIENT
Start: 2022-01-18 | End: 2022-01-18 | Stop reason: HOSPADM

## 2022-01-18 RX ORDER — HYDROMORPHONE HYDROCHLORIDE 2 MG/ML
INJECTION, SOLUTION INTRAMUSCULAR; INTRAVENOUS; SUBCUTANEOUS PRN
Status: DISCONTINUED | OUTPATIENT
Start: 2022-01-18 | End: 2022-01-18 | Stop reason: SURG

## 2022-01-18 RX ORDER — LIDOCAINE HYDROCHLORIDE 20 MG/ML
INJECTION, SOLUTION EPIDURAL; INFILTRATION; INTRACAUDAL; PERINEURAL PRN
Status: DISCONTINUED | OUTPATIENT
Start: 2022-01-18 | End: 2022-01-18 | Stop reason: SURG

## 2022-01-18 RX ORDER — OXYCODONE HYDROCHLORIDE AND ACETAMINOPHEN 5; 325 MG/1; MG/1
2 TABLET ORAL
Status: COMPLETED | OUTPATIENT
Start: 2022-01-18 | End: 2022-01-18

## 2022-01-18 RX ADMIN — OXYCODONE HYDROCHLORIDE AND ACETAMINOPHEN 2 TABLET: 5; 325 TABLET ORAL at 09:59

## 2022-01-18 RX ADMIN — HALOPERIDOL LACTATE 1 MG: 5 INJECTION, SOLUTION INTRAMUSCULAR at 09:10

## 2022-01-18 RX ADMIN — KETOROLAC TROMETHAMINE 30 MG: 30 INJECTION, SOLUTION INTRAMUSCULAR at 09:18

## 2022-01-18 RX ADMIN — HYDROMORPHONE HYDROCHLORIDE 0.5 MG: 2 INJECTION, SOLUTION INTRAMUSCULAR; INTRAVENOUS; SUBCUTANEOUS at 09:09

## 2022-01-18 RX ADMIN — CEFAZOLIN 2 G: 330 INJECTION, POWDER, FOR SOLUTION INTRAMUSCULAR; INTRAVENOUS at 09:15

## 2022-01-18 RX ADMIN — HALOPERIDOL LACTATE 1 MG: 5 INJECTION, SOLUTION INTRAMUSCULAR at 09:42

## 2022-01-18 RX ADMIN — PROPOFOL 250 MG: 10 INJECTION, EMULSION INTRAVENOUS at 09:15

## 2022-01-18 RX ADMIN — DEXAMETHASONE SODIUM PHOSPHATE 8 MG: 4 INJECTION, SOLUTION INTRA-ARTICULAR; INTRALESIONAL; INTRAMUSCULAR; INTRAVENOUS; SOFT TISSUE at 09:16

## 2022-01-18 RX ADMIN — SODIUM CHLORIDE, POTASSIUM CHLORIDE, SODIUM LACTATE AND CALCIUM CHLORIDE: 600; 310; 30; 20 INJECTION, SOLUTION INTRAVENOUS at 09:08

## 2022-01-18 RX ADMIN — FENTANYL CITRATE 100 MCG: 50 INJECTION, SOLUTION INTRAMUSCULAR; INTRAVENOUS at 09:15

## 2022-01-18 RX ADMIN — ONDANSETRON 4 MG: 2 INJECTION INTRAMUSCULAR; INTRAVENOUS at 09:18

## 2022-01-18 RX ADMIN — LIDOCAINE HYDROCHLORIDE 100 MG: 20 INJECTION, SOLUTION EPIDURAL; INFILTRATION; INTRACAUDAL at 09:15

## 2022-01-18 ASSESSMENT — PAIN SCALES - GENERAL: PAIN_LEVEL: 0

## 2022-01-18 ASSESSMENT — FIBROSIS 4 INDEX: FIB4 SCORE: 1.62

## 2022-01-18 NOTE — DISCHARGE INSTRUCTIONS
ACTIVITY: Rest and take it easy for the first 24 hours.  A responsible adult is recommended to remain with you during that time.  It is normal to feel sleepy.  We encourage you to not do anything that requires balance, judgment or coordination.    MILD FLU-LIKE SYMPTOMS ARE NORMAL. YOU MAY EXPERIENCE GENERALIZED MUSCLE ACHES, THROAT IRRITATION, HEADACHE AND/OR SOME NAUSEA.    FOR 24 HOURS DO NOT:  Drive, operate machinery or run household appliances.  Drink beer or alcoholic beverages.   Make important decisions or sign legal documents.    SPECIAL INSTRUCTIONS:     1.   The pain medication is extremely constipating.    Take a stool softener and drink lots of water.  It also can be addicting.  Please try to transition to an over the counter pain remedy as soon as possible.    2.   Alternating ice and heat for 30 minutes can greatly reduce your pain.    3.   It's ok to shower on the day after your surgery.   Do not scrub the incisions.    4.   After laparoscopy, it's common to have shoulder pain or pain when you take a deep breath.   If the pain radiates down your arm, call or present to the ER.    5.   Please call for redness or drainage from the wound sites that persists.     6.   Feel free to call with questions at [phone].   If you have paperwork that needs filling out, please contact us at this number.    7.   Follow up with me in 1-2 weeks for your postoperative exam.    8.   Activity restrictions vary according to the surgery.   If it hurts, don't do it.   You may begin light exercise at 7-10 days.    Billy Berrios MD [facility]  [phone] NOTICE: This order will  in 24 hours.      DIET: To avoid nausea, slowly advance diet as tolerated, avoiding spicy or greasy foods for the first day.  Add more substantial food to your diet according to your physician's instructions.  Babies can be fed formula or breast milk as soon as they are hungry.  INCREASE FLUIDS AND FIBER TO AVOID  CONSTIPATION.    SURGICAL DRESSING/BATHING: It's ok to shower on the day after your surgery.   Do not scrub the incisions.    FOLLOW-UP APPOINTMENT:  A follow-up appointment should be arranged with your doctor; call to schedule.    You should CALL YOUR PHYSICIAN if you develop:  Fever greater than 101 degrees F.  Pain not relieved by medication, or persistent nausea or vomiting.  Excessive bleeding (blood soaking through dressing) or unexpected drainage from the wound.  Extreme redness or swelling around the incision site, drainage of pus or foul smelling drainage.  Inability to urinate or empty your bladder within 8 hours.  Problems with breathing or chest pain.    You should call 911 if you develop problems with breathing or chest pain.  If you are unable to contact your doctor or surgical center, you should go to the nearest emergency room or urgent care center.  Physician's telephone #: Dr. Berrios [phone]      If any questions arise, call your doctor.  If your doctor is not available, please feel free to call the Surgical Center at [phone].     A registered nurse may call you a few days after your surgery to see how you are doing after your procedure.    MEDICATIONS: Resume taking daily medication.  Take prescribed pain medication with food.  If no medication is prescribed, you may take non-aspirin pain medication if needed.  PAIN MEDICATION CAN BE VERY CONSTIPATING.  Take a stool softener or laxative such as senokot, pericolace, or milk of magnesia if needed.    Prescription for tylenol sent to pharmacy by MD.  Last pain medication given at 9:59 (Percocet).    If your physician has prescribed pain medication that includes Acetaminophen (Tylenol), do not take additional Acetaminophen (Tylenol) while taking the prescribed medication.    Depression / Suicide Risk    As you are discharged from this [facility] facility, it is important to learn how to keep safe from harming yourself.    Recognize the  warning signs:  · Abrupt changes in personality, positive or negative- including increase in energy   · Giving away possessions  · Change in eating patterns- significant weight changes-  positive or negative  · Change in sleeping patterns- unable to sleep or sleeping all the time   · Unwillingness or inability to communicate  · Depression  · Unusual sadness, discouragement and loneliness  · Talk of wanting to die  · Neglect of personal appearance   · Rebelliousness- reckless behavior  · Withdrawal from people/activities they love  · Confusion- inability to concentrate     If you or a loved one observes any of these behaviors or has concerns about self-harm, here's what you can do:  · Talk about it- your feelings and reasons for harming yourself  · Remove any means that you might use to hurt yourself (examples: pills, rope, extension cords, firearm)  · Get professional help from the community (Mental Health, Substance Abuse, psychological counseling)  · Do not be alone:Call your Safe Contact- someone whom you trust who will be there for you.  · Call your local CRISIS HOTLINE 964-7927 or 710-055-6032  · Call your local Children's Mobile Crisis Response Team [place] (416) 993-4241 or www.[employer]  · Call the toll free National Suicide Prevention Hotlines   · National Suicide Prevention Lifeline 263-363-EFHL (3431)  · National Hope Line Network 800-SUICIDE (850-3286)

## 2022-01-18 NOTE — PROGRESS NOTES
Pt is aaox4, resting comfortably in [facility] on room air. He is treated for pain per MAR and placed in a position of comfort. He tolerates po fluids without difficulty or complaint of n/v. Respirations are equal and unlabored, he is in no acute distress. Surgical site is clean, dry and intact. Will continue to monitor.

## 2022-01-18 NOTE — OP REPORT
OPERATIVE NOTE    PREOPERATIVE DIAGNOSIS: Nonhealing wound, abdominal wall    POSTOPERATIVE DIAGNOSIS: Same    PROCEDURE PERFORMED: 1.  Debridement of subcutaneous tissues with fasciocutaneous flaps.  Approximately 4 cm x 10 cm 2.  Secondary closure of surgical wound or dehiscence 3.  Negative pressure dressing placement    SURGEON: Billy Berrios M.D.    ASSISTANT: None    ANESTHESIOLOGIST:  Anesthesiologist: Thais Diego M.D.     ANESTHESIA: general     FINDINGS: Good granulation tissue..     WOUND CLASS: Contaminated    SPECIMEN: None    ESTIMATED BLOOD LOSS: 20 mL    Indications: Patient known to me status post complicated abdominal wall reconstruction his wound dehisced.  It did spread to approximately 5 cm which would be difficult to heal.  Patient was counseled extensively as to the risk first benefits of surgery and agreed to proceed fully informed.    Description:    Patient was prepped and draped in the standard sterile surgical fashion after induction of general anesthesia.  An appropriate timeout was performed and antibiotics delivered.  I began turning his round wound into an elliptical wound for closure.  I undermined laterally along the anterior fascia of the abdominal wall circumferentially in order to allow for better closure.  I did this approximately 4 x 10 cm with Bovie cautery to the level of the fascia.    I then copiously irrigated the wound and assured that hemostasis was achieved.  I debrided some devitalized tissue centrally with the Bovie cautery.    I then closed the wound with interrupted vertical mattress 2-0 nylon sutures.  I brought this all the way to approximately a 2 x 3 cm patch where the tension was too high for closure.  I placed a negative pressure dressing in the wound, approximately 3 cm x 2 cm.    This held excellent suction.  The patient was then extubated to recovery in satisfactory condition.        ____________________________________     Billy Berrios M.D.    DT:  1/18/2022  9:45 AM      Cc: Claudia Sepulveda P.A.-C.

## 2022-01-18 NOTE — PROGRESS NOTES
Patient arrived in phase II, A&Ox4, pain is tolerable, no complaints of nausea, dressing to abdomen CDI, abdominal binder in place.     Patient verbalizes readiness for discharge.  Prescriptions sent to pharmacy by MD.  Instructions, medication, and follow up appointment reviewed with patient, understanding verbalized.  Discharge instructions signed. IV discontinued. Patient verbalized all belongings had been returned.  Wound care supplies sent home with patient. Patient states home health has already been in contact with him.    Discharged via wheelchair.

## 2022-01-18 NOTE — ANESTHESIA PREPROCEDURE EVALUATION
Case: [id number] Date/Time: 12/06/21 1145    Procedure: REPAIR, HERNIA, VENTRAL, ROBOT-ASSISTED, USING DA SHERRY XI - TRANSVERSUS ABDOMINIS RELEASE RECURRENT, WITH MESH (Bilateral )    Pre-op diagnosis: RECURRENT INCISIONAL HERNIA    Location: [facility] OR 11 / SURGERY [facility]    Surgeons: Billy Berrios M.D.        HTN: held lisinopril/HCTZ this AM, p[t describes zero pain preoperatively with no pain meds at home (not taking APAP or NSAIDS).   Pt denies CP/SOB>4mets.  Denies CAD, CHF, CVA, CKD, DM2, bleeding/clotting d/o.  Denies smoking or RAD.  Describes hx of emerging violently.  Denies motion sickness.   Denies symptomatic GERD.  Discussed risks/benefits GA and possible TAPS vs erector spinae. Questions answered.      Relevant Problems      (positive) JAGJIT (acute kidney injury) (HCC)       Physical Exam    Airway   Mallampati: III  TM distance: >3 FB  Neck ROM: full       Cardiovascular - normal exam  Rhythm: regular  Rate: normal  (-) murmur     Dental - normal exam           Pulmonary - normal exam  Breath sounds clear to auscultation     Abdominal    Neurological - normal exam                 Anesthesia Plan    ASA 2       Plan - general       Airway plan will be LMA          Induction: intravenous      Pertinent diagnostic labs and testing reviewed    Informed Consent:    Anesthetic plan and risks discussed with patient.

## 2022-01-18 NOTE — ANESTHESIA PROCEDURE NOTES
Airway    Date/Time: 1/18/2022 9:15 AM  Performed by: Thais Diego M.D.  Authorized by: Thais Diego M.D.     Location:  OR  Urgency:  Elective  Difficult Airway: No    Indications for Airway Management:  Anesthesia      Spontaneous Ventilation: absent    Sedation Level:  Deep  Preoxygenated: Yes    Mask Difficulty Assessment:  0 - not attempted  Final Airway Type:  Supraglottic airway  Final Supraglottic Airway:  Standard LMA    SGA Size:  5  Number of Attempts at Approach:  1   atraumatic

## 2022-01-18 NOTE — ANESTHESIA POSTPROCEDURE EVALUATION
Patient: Billy Stephenson    Procedure Summary     Date: 01/18/22 Room / Location: [place] [address] / SURGERY [facility]    Anesthesia Start: 0908 Anesthesia Stop: 0950    Procedures:       IRRIGATION AND DEBRIDEMENT, WOUND-ABDOMINAL WALL WOUND (N/A Abdomen)      CLOSURE, WOUND-PARTIAL (N/A Abdomen)      APPLICATION OR REPLACEMENT,WOUND VAC-PLACEMENT OF NEGATIVE PRESSURE WOUND THERAPY DEVICE (N/A Abdomen) Diagnosis: (DEHISCENCE OF SURGICAL WOUND)    Surgeons: Billy Berrios M.D. Responsible Provider: Thais Diego M.D.    Anesthesia Type: general ASA Status: 2          Final Anesthesia Type: general  Last vitals  BP   Blood Pressure: 136/98    Temp   35.9 °C (96.6 °F)    Pulse   77   Resp   18    SpO2   99 %      Anesthesia Post Evaluation    Patient location during evaluation: PACU  Patient participation: complete - patient participated  Level of consciousness: awake and alert  Pain score: 0    Airway patency: patent  Anesthetic complications: no  Cardiovascular status: hemodynamically stable  Respiratory status: acceptable  Hydration status: euvolemic    PONV: none          No complications documented.     Nurse Pain Score: 3 (NPRS)

## 2022-01-28 ENCOUNTER — OFFICE VISIT (OUTPATIENT)
Dept: WOUND CARE | Facility: MEDICAL CENTER | Age: 57
End: 2022-01-28
Attending: SURGERY
Payer: COMMERCIAL

## 2022-01-28 VITALS
HEART RATE: 67 BPM | DIASTOLIC BLOOD PRESSURE: 94 MMHG | SYSTOLIC BLOOD PRESSURE: 176 MMHG | OXYGEN SATURATION: 95 % | RESPIRATION RATE: 19 BRPM | TEMPERATURE: 97.6 F

## 2022-01-28 DIAGNOSIS — S31.109D OPEN WOUND OF ABDOMEN, SUBSEQUENT ENCOUNTER: Primary | ICD-10-CM

## 2022-01-28 DIAGNOSIS — T14.8XXA WOUND INFECTION: ICD-10-CM

## 2022-01-28 DIAGNOSIS — Z98.890 HISTORY OF HERNIA REPAIR: ICD-10-CM

## 2022-01-28 DIAGNOSIS — L08.9 WOUND INFECTION: ICD-10-CM

## 2022-01-28 DIAGNOSIS — Z87.19 HISTORY OF HERNIA REPAIR: ICD-10-CM

## 2022-01-28 PROCEDURE — 97605 NEG PRS WND THER DME<=50SQCM: CPT

## 2022-01-28 PROCEDURE — 99214 OFFICE O/P EST MOD 30 MIN: CPT | Mod: 25 | Performed by: NURSE PRACTITIONER

## 2022-01-28 PROCEDURE — 11042 DBRDMT SUBQ TIS 1ST 20SQCM/<: CPT | Performed by: NURSE PRACTITIONER

## 2022-01-28 PROCEDURE — 99214 OFFICE O/P EST MOD 30 MIN: CPT

## 2022-01-28 PROCEDURE — 11042 DBRDMT SUBQ TIS 1ST 20SQCM/<: CPT

## 2022-01-28 RX ORDER — BENZONATATE 100 MG/1
CAPSULE ORAL
COMMUNITY

## 2022-01-28 RX ORDER — COLCHICINE 0.6 MG/1
TABLET ORAL PRN
COMMUNITY

## 2022-01-28 RX ORDER — AMOXICILLIN AND CLAVULANATE POTASSIUM 875; 125 MG/1; MG/1
TABLET, FILM COATED ORAL
COMMUNITY
Start: 2022-01-24 | End: 2022-03-04

## 2022-01-28 ASSESSMENT — ENCOUNTER SYMPTOMS
FEVER: 0
VOMITING: 0
COUGH: 0
DIZZINESS: 0
PALPITATIONS: 0
DIARRHEA: 0
NAUSEA: 0
BACK PAIN: 0
DEPRESSION: 0
CONSTIPATION: 0
CHILLS: 0
SHORTNESS OF BREATH: 0

## 2022-01-28 NOTE — PROGRESS NOTES
Provider Encounter- Full Thickness wound    HISTORY OF PRESENT ILLNESS  Wound History:    START OF CARE IN CLINIC: 1/20/2022    REFERRING PROVIDER: Billy Berrios M.D.     WOUND- Full Thickness Wound   LOCATION: Mid abdomen   HISTORY: Patient with history of multiple hernia repairs with postoperative complications. Healed well after surgery in 2015 or 16. Unfortunately, he developed a small wound over the surgical site in the spring of 2020, which has progressively enlarged.  He was referred to another surgeon, and was told another surgery was not an option. He was caring for the wound himself due to lack of insurance. He was applying antibiotic ointment and nonstick pads. He was also using 2 abdominal binders. Once he obtained insurance coverage again, he requested assistance with this wound. He was referred to [facility] by his primary care provider.    Of note-He has had reactions to the adhesive dressings in the past - pulling off his skin and causing rashes.      Patient was taken to surgery on 1/18/2022, underwent I&D of his abdominal wound with VAC placement.  He was referred back to [facility] postoperatively.    Pertinent Medical History: Obesity, multiple hernia repairs, polycythemia    TOBACCO USE: He has never smoked or use smokeless tobacco    Patient's problem list, allergies, and current medications reviewed and updated in Epic    Interval History:  1/28/2022 : InitialClinic visit with HEBER Merrill, DILCIA-BC, CWRONYN, CFCN. Patient returns to clinic after recent surgery. He is feeling well overall, denies fevers, chills, nausea, vomiting, cough or shortness of breath.  He presents today with sutures above and below open wound.  States he will be following up with his surgeon next week, at which time sutures will be removed.  He does have quite a bit of undermining.  May require excision of skin over undermining at some point if wound does not progress.       REVIEW OF SYSTEMS:   Review of Systems    Constitutional: Negative for chills and fever.        More than 30 pound weight loss over the past 6 months, intentional   Respiratory: Negative for cough and shortness of breath.    Cardiovascular: Negative for chest pain, palpitations and leg swelling.   Gastrointestinal: Negative for constipation, diarrhea, nausea and vomiting.   Genitourinary: Negative for dysuria.   Musculoskeletal: Negative for back pain and joint pain.   Skin: Negative for rash.   Neurological: Negative for dizziness.   Psychiatric/Behavioral: Negative for depression.       PHYSICAL EXAMINATION:     Physical Exam  Constitutional:       Appearance: Normal appearance.   HENT:      Head: Normocephalic.      Comments: Hard of hearing  Eyes:      Pupils: Pupils are equal, round, and reactive to light.   Cardiovascular:      Rate and Rhythm: Normal rate.   Pulmonary:      Effort: Pulmonary effort is normal.   Abdominal:      Palpations: Abdomen is soft.      Tenderness: There is no guarding or rebound.      Comments: Prominent ventral hernia   Musculoskeletal:         General: Normal range of motion.   Skin:     General: Skin is warm.      Comments: Full-thickness wound to mid/lower abdomen, sutures above and below  Refer to wound flowsheet and photos   Neurological:      General: No focal deficit present.      Mental Status: He is alert and oriented to person, place, and time.   Psychiatric:         Mood and Affect: Mood normal.         Behavior: Behavior normal.         Thought Content: Thought content normal.         Judgment: Judgment normal.     BP (!) 176/94 (BP Location: Left arm, Patient Position: Sitting) Comment: 2nd set of vitals; Rn notified  Pulse 67   Temp 36.4 °C (97.6 °F) (Temporal)   Resp 19   SpO2 95%      WOUND ASSESSMENT        Negative Pressure Wound Therapy 01/28/22 (Active)   NPWT Pump Mode / Pressure Setting Continuous;125 mmHg 01/28/22 0900   Dressing Type Black Foam (Regular) 01/28/22 0900   Number of Foam Pieces  Used 2 01/28/22 0900   Canister Changed No 01/28/22 0900           Wound 12/06/21 Incision Abdomen 2x2s, tegaderm, island (Active)       Wound 01/28/22 Full Thickness Wound Midline Abdomen (Active)   Wound Image    01/28/22 0900   Site Assessment Red;Yellow;Undermining 01/28/22 0900   Periwound Assessment Scar tissue;Other (Comment) 01/28/22 0900   Margins Unattached edges 01/28/22 0900   Closure Secondary intention 01/28/22 0900   Drainage Amount Moderate 01/28/22 0900   Drainage Description Serosanguineous 01/28/22 0900   Treatments Cleansed;Topical Lidocaine;Provider debridement 01/28/22 0900   Wound Cleansing Normal Saline Irrigation 01/28/22 0900   Periwound Protectant Skin Protectant Wipes to Periwound;Benzoin;Mepitel;Drape 01/28/22 0900   Dressing Cleansing/Solutions Not Applicable 01/28/22 0900   Dressing Options Wound Vac 01/28/22 0900   Dressing Changed New 01/28/22 0900   Dressing Change/Treatment Frequency Monday, Wednesday, Friday, and As Needed 01/28/22 0900   Non-staged Wound Description Full thickness 01/28/22 0900   Wound Length (cm) 2.2 cm 01/28/22 0900   Wound Width (cm) 2.5 cm 01/28/22 0900   Wound Depth (cm) 0.8 cm 01/28/22 0900   Wound Surface Area (cm^2) 5.5 cm^2 01/28/22 0900   Wound Volume (cm^3) 4.4 cm^3 01/28/22 0900   Post-Procedure Length (cm) 2.5 cm 01/28/22 0900   Post-Procedure Width (cm) 2.7 cm 01/28/22 0900   Post-Procedure Depth (cm) 0.8 cm 01/28/22 0900   Post-Procedure Surface Area (cm^2) 6.75 cm^2 01/28/22 0900   Post-Procedure Volume (cm^3) 5.4 cm^3 01/28/22 0900   Tunneling (cm) 0 cm 01/28/22 0900   Undermining (cm) 3.7 cm 01/28/22 0900   Undermining of Wound, 1st Location From 1 o'clock;To 3 o'clock 01/28/22 0900   Undermining (cm) - 2nd location 1.3 cm 01/28/22 0900   Undermining of Wound, 2nd Location From 9 o'clock;To 11 o'clock 01/28/22 0900   Wound Odor None 01/28/22 0900   Exposed Structures HAILEY 01/28/22 0900            PROCEDURE:   -2% viscous lidocaine applied  topically to wound bed for approximately 5 minutes prior to debridement  -Curette used to debride wound bed.  Excisional debridement was performed to remove devitalized tissue until healthy, bleeding tissue was visualized.   Entire surface of wound, 6.75 cm2 debrided.  Tissue debrided into the subcutaneous  layer.    -Bleeding controlled with manual pressure.    -Wound care completed by wound RN, refer to flowsheet  -Patient tolerated the procedure well, without c/o pain or discomfort.       Pertinent Labs and Diagnostics:    Labs:     A1c:   Lab Results   Component Value Date/Time    HBA1C 5.5 06/28/2021 02:47 AM          IMAGING: None found in epic    VASCULAR STUDIES: N/A    LAST  WOUND CULTURE:  DATE : 10/6/2021-culture collected in clinic  Culture Result  Abnormal   Staphylococcus aureus   Light growth              ASSESSMENT AND PLAN:   1. Open wound of abdomen, subsequent encounter  Comments: Spontaneous opening of wound over hernia, started as small wound which progressively enlarged.    1/28/2022: Patient returns to clinic after recent surgical I&D.  Surgical site left partially open to heal by secondary intention with NPWT.    -Excisional debridement of wound in clinic today, medically necessary to promote wound healing.  -Patient to return to clinic 3 times weekly for assessment, debridement, and VAC dressing change  -Patient to keep the dressing dry, maintain pressure at 125 mmHg.  VAC troubleshooting reviewed in clinic    Wound care: NPWT at 125 mmHg to accelerate granulation, change 3 times per week.    2. Wound infection    1/28/2022: Patient is currently on Augmentin.  No signs or symptoms of infection today.  -Monitor each clinic visit  -Pt advised to go to ER for any increased redness, swelling, drainage or odor, or if he develops fever, chills, nausea or vomiting.    3. History of hernia repair  Comments: History of multiple hernia surgery repairs.  Most recent hernia repair and December 2021 with  Dr. Berrios.  Patient then underwent surgical ID and partial repair of dehiscence 1/18/2022:    1/28/2022: Patient returns to clinic after his recent surgery.  States he is feeling well        PATIENT EDUCATION  - Importance of adequate nutrition for wound healing  -Advised to go to ER for any increased redness, swelling, drainage, or odor, or if patient develops fever, chills, nausea or vomiting.     30 min spent face to face with patient, >50% of time spent counseling, coordinating care, reviewing records, discussing POC, educating patient.  Time spent in excess of procedural time.      Please note that this note may have been created using voice recognition software. I have worked with technical experts from [employer] to optimize the interface.  I have made every reasonable attempt to correct obvious errors, but there may be errors of grammar and possibly content that I did not discover before finalizing the note.    N

## 2022-01-28 NOTE — PATIENT INSTRUCTIONS
-Keep your wound dressing clean, dry, and intact.    -Change your dressing if it becomes soiled, soaked, or falls off.    -Wound vac may not have any drainage in tube or cannister & it will still be working.   Change cannister if it does become full by pressing tab on side of machine to remove canister and snap on new one. Full canister can be thrown in the trash. If cannister fills with bright red blood - go to ER. Dressing will be changed every MWF at the wound clini.  If you are having issues with your wound VAC, please consider patching leaks, changing the canister, or calling [phone] for troubleshooting. If the wound VAC has been off or un-operational for over 2 hours, call wound care center to inform them and remove all dressings including black foam and replace with normal saline damp gauze.     -Should you experience any significant changes in your wound(s), such as infection (redness, swelling, localized heat, increased pain, fever > 101 F, chills) or have any questions regarding your home care instructions, please contact the wound center at [phone]. If after hours, contact your primary care physician or go to the hospital emergency room.

## 2022-02-04 ENCOUNTER — OFFICE VISIT (OUTPATIENT)
Dept: WOUND CARE | Facility: MEDICAL CENTER | Age: 57
End: 2022-02-04
Attending: SURGERY
Payer: COMMERCIAL

## 2022-02-04 VITALS
DIASTOLIC BLOOD PRESSURE: 99 MMHG | HEART RATE: 81 BPM | TEMPERATURE: 96.7 F | OXYGEN SATURATION: 96 % | SYSTOLIC BLOOD PRESSURE: 167 MMHG | RESPIRATION RATE: 18 BRPM

## 2022-02-04 DIAGNOSIS — Z87.19 HISTORY OF HERNIA REPAIR: ICD-10-CM

## 2022-02-04 DIAGNOSIS — Z98.890 HISTORY OF HERNIA REPAIR: ICD-10-CM

## 2022-02-04 DIAGNOSIS — T14.8XXA WOUND INFECTION: ICD-10-CM

## 2022-02-04 DIAGNOSIS — S31.109D OPEN WOUND OF ABDOMEN, SUBSEQUENT ENCOUNTER: Primary | ICD-10-CM

## 2022-02-04 DIAGNOSIS — L08.9 WOUND INFECTION: ICD-10-CM

## 2022-02-04 PROCEDURE — 11042 DBRDMT SUBQ TIS 1ST 20SQCM/<: CPT

## 2022-02-04 PROCEDURE — 11043 DBRDMT MUSC&/FSCA 1ST 20/<: CPT

## 2022-02-04 PROCEDURE — 11042 DBRDMT SUBQ TIS 1ST 20SQCM/<: CPT | Performed by: NURSE PRACTITIONER

## 2022-02-04 PROCEDURE — 97605 NEG PRS WND THER DME<=50SQCM: CPT

## 2022-02-04 PROCEDURE — 99213 OFFICE O/P EST LOW 20 MIN: CPT

## 2022-02-04 PROCEDURE — 99213 OFFICE O/P EST LOW 20 MIN: CPT | Mod: 25 | Performed by: NURSE PRACTITIONER

## 2022-02-04 ASSESSMENT — ENCOUNTER SYMPTOMS
DIZZINESS: 0
NAUSEA: 0
SHORTNESS OF BREATH: 0
VOMITING: 0
CHILLS: 0
BACK PAIN: 0
CONSTIPATION: 0
COUGH: 0
FEVER: 0
DIARRHEA: 0
DEPRESSION: 0
PALPITATIONS: 0

## 2022-02-04 NOTE — PROGRESS NOTES
Provider Encounter- Full Thickness wound    HISTORY OF PRESENT ILLNESS  Wound History:    START OF CARE IN CLINIC: 1/20/2022    REFERRING PROVIDER: Billy Berrios M.D.     WOUND- Full Thickness Wound   LOCATION: Mid abdomen   HISTORY: Patient with history of multiple hernia repairs with postoperative complications. Healed well after surgery in 2015 or 16. Unfortunately, he developed a small wound over the surgical site in the spring of 2020, which has progressively enlarged.  He was referred to another surgeon, and was told another surgery was not an option. He was caring for the wound himself due to lack of insurance. He was applying antibiotic ointment and nonstick pads. He was also using 2 abdominal binders. Once he obtained insurance coverage again, he requested assistance with this wound. He was referred to [facility] by his primary care provider.    Of note-He has had reactions to the adhesive dressings in the past - pulling off his skin and causing rashes.      Patient was taken to surgery on 1/18/2022, underwent I&D of his abdominal wound with VAC placement.  He was referred back to [facility] postoperatively.    Pertinent Medical History: Obesity, multiple hernia repairs, polycythemia    TOBACCO USE: He has never smoked or use smokeless tobacco    Patient's problem list, allergies, and current medications reviewed and updated in Epic    Interval History:  1/28/2022 : InitialClinic visit with HEBER Merrill FNP-BC, CWOCN, CFCN. Patient returns to clinic after recent surgery. He is feeling well overall, denies fevers, chills, nausea, vomiting, cough or shortness of breath.  He presents today with sutures above and below open wound.  States he will be following up with his surgeon next week, at which time sutures will be removed.  He does have quite a bit of undermining.  May require excision of skin over undermining at some point if wound does not progress.    2/4/2022 : Clinic visit with HEBER Merrill FNP-BC,  CWOCN, CN.  Billy states he is feeling much better since having his sutures removed earlier this week.  He has cut way back on his pain medication.  He states that his surgeon is pleased with wound progress.  Overall, he is feeling well,denies fevers, chills, nausea, vomiting, cough or shortness of breath.  His appetite is good, and his weight stable.       REVIEW OF SYSTEMS:   Review of Systems   Constitutional: Negative for chills and fever.        More than 30 pound weight loss over the past 6 months, intentional   Respiratory: Negative for cough and shortness of breath.    Cardiovascular: Negative for chest pain, palpitations and leg swelling.   Gastrointestinal: Negative for constipation, diarrhea, nausea and vomiting.   Genitourinary: Negative for dysuria.   Musculoskeletal: Negative for back pain and joint pain.   Skin: Negative for rash.   Neurological: Negative for dizziness.   Psychiatric/Behavioral: Negative for depression.       PHYSICAL EXAMINATION:     Physical Exam  Constitutional:       Appearance: Normal appearance.   HENT:      Head: Normocephalic.      Comments: Hard of hearing  Eyes:      Pupils: Pupils are equal, round, and reactive to light.   Cardiovascular:      Rate and Rhythm: Normal rate.   Pulmonary:      Effort: Pulmonary effort is normal.   Abdominal:      Palpations: Abdomen is soft.      Tenderness: There is no guarding or rebound.      Comments: Prominent ventral hernia   Musculoskeletal:         General: Normal range of motion.   Skin:     General: Skin is warm.      Comments: Full-thickness wound to mid/lower abdomen, sutures above and below  Refer to wound flowsheet and photos   Neurological:      General: No focal deficit present.      Mental Status: He is alert and oriented to person, place, and time.   Psychiatric:         Mood and Affect: Mood normal.         Behavior: Behavior normal.         Thought Content: Thought content normal.         Judgment: Judgment normal.     BP  (!) 167/99 Comment: RN notified  Pulse 81   Temp 35.9 °C (96.7 °F)   Resp 18   SpO2 96%      WOUND ASSESSMENT          Wound 12/06/21 Incision Abdomen 2x2s, tegaderm, island (Active)       Wound 01/28/22 Full Thickness Wound Midline Abdomen (Active)   Wound Image    02/04/22 0926   Site Assessment Red;Yellow;Undermining 02/04/22 0926   Periwound Assessment Fragile;Scar tissue 02/04/22 0926   Margins Unattached edges 02/04/22 0926   Closure Secondary intention 01/28/22 0900   Drainage Amount Moderate 02/04/22 0926   Drainage Description Serosanguineous 02/04/22 0926   Treatments Cleansed;Topical Lidocaine;Provider debridement 02/04/22 0926   Wound Cleansing Normal Saline Irrigation 02/04/22 0926   Periwound Protectant Skin Protectant Wipes to Periwound;Benzoin;Hydrofiber;Drape 02/04/22 0926   Dressing Cleansing/Solutions Not Applicable 02/04/22 0926   Dressing Options Wound Vac 02/04/22 0926   Dressing Changed New 01/28/22 0900   Dressing Change/Treatment Frequency Monday, Wednesday, Friday, and As Needed 01/28/22 0900   Non-staged Wound Description Full thickness 02/04/22 0926   Wound Length (cm) 4.5 cm 02/04/22 0926   Wound Width (cm) 4.1 cm 02/04/22 0926   Wound Depth (cm) 1 cm 02/04/22 0926   Wound Surface Area (cm^2) 18.45 cm^2 02/04/22 0926   Wound Volume (cm^3) 18.45 cm^3 02/04/22 0926   Post-Procedure Length (cm) 4.5 cm 02/04/22 0926   Post-Procedure Width (cm) 4.2 cm 02/04/22 0926   Post-Procedure Depth (cm) 1 cm 02/04/22 0926   Post-Procedure Surface Area (cm^2) 18.9 cm^2 02/04/22 0926   Post-Procedure Volume (cm^3) 18.9 cm^3 02/04/22 0926   Wound Healing % -319 02/04/22 0926   Tunneling (cm) 0 cm 02/04/22 0926   Undermining (cm) 2.5 cm 02/04/22 0926   Undermining of Wound, 1st Location From 1 o'clock;To 3 o'clock 02/04/22 0926   Undermining (cm) - 2nd location 1 cm 02/04/22 0926   Undermining of Wound, 2nd Location From 7 o'clock;To 9 o'clock 02/04/22 0926   Wound Odor None 02/04/22 0926   Exposed  Structures Mesh 02/04/22 0926                         PROCEDURE:   -2% viscous lidocaine applied topically to wound bed for approximately 5 minutes prior to debridement  -Curette used to debride wound bed.  Excisional debridement was performed to remove devitalized tissue until healthy, bleeding tissue was visualized.   Entire surface of wound, 18.9 cm2 debrided.  Tissue debrided into the subcutaneous  layer.    -Bleeding controlled with manual pressure.    -Wound care completed by wound RN, refer to flowsheet  -Patient tolerated the procedure well, without c/o pain or discomfort.       Pertinent Labs and Diagnostics:    Labs:     A1c:   Lab Results   Component Value Date/Time    HBA1C 5.5 06/28/2021 02:47 AM          IMAGING: None found in epic    VASCULAR STUDIES: N/A    LAST  WOUND CULTURE:  DATE : 10/6/2021-culture collected in clinic  Culture Result  Abnormal   Staphylococcus aureus   Light growth              ASSESSMENT AND PLAN:   1. Open wound of abdomen, subsequent encounter  Comments: Spontaneous opening of wound over hernia, started as small wound which progressively enlarged.    2/4/2022: Remaining sutures removed by surgeon earlier this week.  Patient states he is feeling much better from pain standpoint.  -Excisional debridement of wound in clinic today, medically necessary to promote wound healing.  -Patient to return to clinic 3 times weekly for assessment, debridement, and VAC dressing change  -Patient to keep the dressing dry, maintain pressure at 125 mmHg.  VAC troubleshooting reviewed in clinic    Wound care: NPWT at 125 mmHg to accelerate granulation, change 3 times per week.    2. Wound infection    2/4/2022: No signs or symptoms of infection today.  -Monitor each clinic visit  -Pt advised to go to ER for any increased redness, swelling, drainage or odor, or if he develops fever, chills, nausea or vomiting.    3. History of hernia repair  Comments: History of multiple hernia surgery repairs.   Most recent hernia repair and December 2021 with Dr. Berrios.  Patient then underwent surgical ID and partial repair of dehiscence 1/18/2022:    2/4/2022: Recent hernia repair, see above.        PATIENT EDUCATION  - Importance of adequate nutrition for wound healing  -Advised to go to ER for any increased redness, swelling, drainage, or odor, or if patient develops fever, chills, nausea or vomiting.     20 min spent face to face with patient, >50% of time spent counseling, coordinating care, reviewing records, discussing POC, educating patient.  Time spent in excess of procedural time.      Please note that this note may have been created using voice recognition software. I have worked with technical experts from [facility] [employer] to optimize the interface.  I have made every reasonable attempt to correct obvious errors, but there may be errors of grammar and possibly content that I did not discover before finalizing the note.    N

## 2022-02-11 ENCOUNTER — OFFICE VISIT (OUTPATIENT)
Dept: WOUND CARE | Facility: MEDICAL CENTER | Age: 57
End: 2022-02-11
Attending: SURGERY
Payer: COMMERCIAL

## 2022-02-11 VITALS
OXYGEN SATURATION: 93 % | HEART RATE: 90 BPM | RESPIRATION RATE: 18 BRPM | DIASTOLIC BLOOD PRESSURE: 99 MMHG | SYSTOLIC BLOOD PRESSURE: 146 MMHG | TEMPERATURE: 97.9 F

## 2022-02-11 DIAGNOSIS — Z98.890 HISTORY OF HERNIA REPAIR: ICD-10-CM

## 2022-02-11 DIAGNOSIS — T14.8XXA WOUND INFECTION: ICD-10-CM

## 2022-02-11 DIAGNOSIS — Z87.19 HISTORY OF HERNIA REPAIR: ICD-10-CM

## 2022-02-11 DIAGNOSIS — S31.109D OPEN WOUND OF ABDOMEN, SUBSEQUENT ENCOUNTER: ICD-10-CM

## 2022-02-11 DIAGNOSIS — L08.9 WOUND INFECTION: ICD-10-CM

## 2022-02-11 PROCEDURE — 99213 OFFICE O/P EST LOW 20 MIN: CPT

## 2022-02-11 PROCEDURE — 11042 DBRDMT SUBQ TIS 1ST 20SQCM/<: CPT | Performed by: NURSE PRACTITIONER

## 2022-02-11 PROCEDURE — 11045 DBRDMT SUBQ TISS EACH ADDL: CPT | Performed by: NURSE PRACTITIONER

## 2022-02-11 PROCEDURE — 99213 OFFICE O/P EST LOW 20 MIN: CPT | Mod: 25 | Performed by: NURSE PRACTITIONER

## 2022-02-11 PROCEDURE — 11045 DBRDMT SUBQ TISS EACH ADDL: CPT

## 2022-02-11 PROCEDURE — 97605 NEG PRS WND THER DME<=50SQCM: CPT

## 2022-02-11 PROCEDURE — 11042 DBRDMT SUBQ TIS 1ST 20SQCM/<: CPT

## 2022-02-11 ASSESSMENT — ENCOUNTER SYMPTOMS
FEVER: 0
DIZZINESS: 0
DIARRHEA: 0
BACK PAIN: 0
VOMITING: 0
CHILLS: 0
SHORTNESS OF BREATH: 0
NAUSEA: 0
PALPITATIONS: 0
CONSTIPATION: 0
DEPRESSION: 0
COUGH: 0

## 2022-02-11 NOTE — PROGRESS NOTES
Provider Encounter- Full Thickness wound    HISTORY OF PRESENT ILLNESS  Wound History:    START OF CARE IN CLINIC: 1/20/2022    REFERRING PROVIDER: Billy Berrios M.D.     WOUND- Full Thickness Wound   LOCATION: Mid abdomen   HISTORY: Patient with history of multiple hernia repairs with postoperative complications. Healed well after surgery in 2015 or 16. Unfortunately, he developed a small wound over the surgical site in the spring of 2020, which has progressively enlarged.  He was referred to another surgeon, and was told another surgery was not an option. He was caring for the wound himself due to lack of insurance. He was applying antibiotic ointment and nonstick pads. He was also using 2 abdominal binders. Once he obtained insurance coverage again, he requested assistance with this wound. He was referred to [facility] by his primary care provider.    Of note-He has had reactions to the adhesive dressings in the past - pulling off his skin and causing rashes.      Patient was taken to surgery on 1/18/2022, underwent I&D of his abdominal wound with VAC placement.  He was referred back to [facility] postoperatively.    Pertinent Medical History: Obesity, multiple hernia repairs, polycythemia    TOBACCO USE: He has never smoked or use smokeless tobacco    Patient's problem list, allergies, and current medications reviewed and updated in Epic    Interval History:  1/28/2022 : InitialClinic visit with HEBER Merrill FNP-BC, CWOCN, CFCN. Patient returns to clinic after recent surgery. He is feeling well overall, denies fevers, chills, nausea, vomiting, cough or shortness of breath.  He presents today with sutures above and below open wound.  States he will be following up with his surgeon next week, at which time sutures will be removed.  He does have quite a bit of undermining.  May require excision of skin over undermining at some point if wound does not progress.    2/4/2022 : Clinic visit with HEBER Merrill FNP-BC,  ANGELITA, BARNEY.  Billy states he is feeling much better since having his sutures removed earlier this week.  He has cut way back on his pain medication.  He states that his surgeon is pleased with wound progress.  Overall, he is feeling well,denies fevers, chills, nausea, vomiting, cough or shortness of breath.  His appetite is good, and his weight stable.    2/11/2022 : Clinic visit with HEBER Merrill, EDWARD, ANGELITA, BARNEY.    Billy continues to feel well overall. States his appetite is normal, his weight has been stable.       REVIEW OF SYSTEMS:   Review of Systems   Constitutional: Negative for chills and fever.   Respiratory: Negative for cough and shortness of breath.    Cardiovascular: Negative for chest pain, palpitations and leg swelling.   Gastrointestinal: Negative for constipation, diarrhea, nausea and vomiting.   Genitourinary: Negative for dysuria.   Musculoskeletal: Negative for back pain and joint pain.   Skin: Negative for rash.   Neurological: Negative for dizziness.   Psychiatric/Behavioral: Negative for depression.       PHYSICAL EXAMINATION:     Physical Exam  Constitutional:       Appearance: Normal appearance.   HENT:      Head: Normocephalic.      Comments: Hard of hearing  Eyes:      Pupils: Pupils are equal, round, and reactive to light.   Cardiovascular:      Rate and Rhythm: Normal rate.   Pulmonary:      Effort: Pulmonary effort is normal.   Abdominal:      Palpations: Abdomen is soft.      Tenderness: There is no guarding or rebound.      Comments: Prominent ventral hernia   Musculoskeletal:         General: Normal range of motion.   Skin:     General: Skin is warm.      Comments: Full-thickness wound to mid/lower abdomen, sutures above and below  Refer to wound flowsheet and photos   Neurological:      General: No focal deficit present.      Mental Status: He is alert and oriented to person, place, and time.   Psychiatric:         Mood and Affect: Mood normal.         Behavior:  Behavior normal.         Thought Content: Thought content normal.         Judgment: Judgment normal.     /99 Comment: RN notified  Pulse 90   Temp 36.6 °C (97.9 °F)   Resp 18   SpO2 93%      WOUND ASSESSMENT   Negative Pressure Wound Therapy 01/28/22 (Active)   NPWT Pump Mode / Pressure Setting Continuous;125 mmHg 02/11/22 0845   Dressing Type Black Foam (Regular) 02/11/22 0845   Number of Foam Pieces Used 1 02/11/22 0845   Canister Changed No 02/11/22 0845           Wound 12/06/21 Incision Abdomen 2x2s, tegaderm, island (Active)       Wound 01/28/22 Full Thickness Wound Midline Abdomen (Active)   Wound Image    02/11/22 0845   Site Assessment Red;Yellow;Undermining 02/11/22 0845   Periwound Assessment Fragile;Scar tissue 02/11/22 0845   Margins Unattached edges 02/11/22 0845   Closure Secondary intention 01/28/22 0900   Drainage Amount Moderate 02/11/22 0845   Drainage Description Serosanguineous 02/11/22 0845   Treatments Cleansed;Topical Lidocaine;Provider debridement;Site care 02/11/22 0845   Wound Cleansing Puracyn Spray 02/11/22 0845   Periwound Protectant Skin Protectant Wipes to Periwound;Benzoin;Paste Ring;Drape 02/11/22 0845   Dressing Cleansing/Solutions Not Applicable 02/11/22 0845   Dressing Options Collagen Dressing;Wound Vac 02/11/22 0845   Dressing Changed Changed 02/11/22 0845   Dressing Change/Treatment Frequency Monday, Wednesday, Friday, and As Needed 01/28/22 0900   Non-staged Wound Description Full thickness 02/11/22 0845   Wound Length (cm) 4.5 cm 02/11/22 0845   Wound Width (cm) 4.5 cm 02/11/22 0845   Wound Depth (cm) 1.1 cm 02/11/22 0845   Wound Surface Area (cm^2) 20.25 cm^2 02/11/22 0845   Wound Volume (cm^3) 22.275 cm^3 02/11/22 0845   Post-Procedure Length (cm) 4.5 cm 02/11/22 0845   Post-Procedure Width (cm) 4.5 cm 02/11/22 0845   Post-Procedure Depth (cm) 1.1 cm 02/11/22 0845   Post-Procedure Surface Area (cm^2) 20.25 cm^2 02/11/22 0845   Post-Procedure Volume (cm^3) 22.275  cm^3 02/11/22 0845   Wound Healing % -406 02/11/22 0845   Tunneling (cm) 0 cm 02/11/22 0845   Undermining (cm) 2.3 cm 02/11/22 0845   Undermining of Wound, 1st Location From 1 o'clock;To 3 o'clock 02/11/22 0845   Undermining (cm) - 2nd location 0.7 cm 02/11/22 0845   Undermining of Wound, 2nd Location From 7 o'clock;To 9 o'clock 02/11/22 0845   Wound Odor None 02/11/22 0845   Exposed Structures Mesh 02/11/22 0845               PROCEDURE:   -2% viscous lidocaine applied topically to wound bed for approximately 5 minutes prior to debridement  -Curette used to debride wound bed.  Excisional debridement was performed to remove devitalized tissue until healthy, bleeding tissue was visualized.   Entire surface of wound, 20.25 cm2 debrided.  Tissue debrided into the subcutaneous  layer.    -Bleeding controlled with manual pressure.    -Wound care completed by wound RN, refer to flowsheet  -Patient tolerated the procedure well, without c/o pain or discomfort.       Pertinent Labs and Diagnostics:    Labs:     A1c:   Lab Results   Component Value Date/Time    HBA1C 5.5 06/28/2021 02:47 AM          IMAGING: None found in epic    VASCULAR STUDIES: N/A    LAST  WOUND CULTURE:  DATE : 10/6/2021-culture collected in clinic  Culture Result  Abnormal   Staphylococcus aureus   Light growth              ASSESSMENT AND PLAN:   1. Open wound of abdomen, subsequent encounter  Comments: Spontaneous opening of wound over hernia, started as small wound which progressively enlarged.    2/11/2022: Total wound area has increased since last assessment, however depth of undermining has decreased.  -Excisional debridement of wound in clinic today, medically necessary to promote wound healing.  -Patient to return to clinic 3 times weekly for assessment, debridement, and VAC dressing change  -Patient to keep the dressing dry, maintain pressure at 125 mmHg.  VAC troubleshooting reviewed in clinic    Wound care: NPWT at 125 mmHg to accelerate  granulation, change 3 times per week.    2. Wound infection    2/11/2022: No signs or symptoms of infection today.  -Monitor each clinic visit  -Pt advised to go to ER for any increased redness, swelling, drainage or odor, or if he develops fever, chills, nausea or vomiting.    3. History of hernia repair  Comments: History of multiple hernia surgery repairs.  Most recent hernia repair and December 2021 with Dr. Berrios.  Patient then underwent surgical ID and partial repair of dehiscence 1/18/2022:    2/11/2022: Recent hernia repair, see above.        PATIENT EDUCATION  - Importance of adequate nutrition for wound healing  -Advised to go to ER for any increased redness, swelling, drainage, or odor, or if patient develops fever, chills, nausea or vomiting.     20 min spent face to face with patient, >50% of time spent counseling, coordinating care, reviewing records, discussing POC, educating patient.  Time spent in excess of procedural time.      Please note that this note may have been created using voice recognition software. I have worked with technical experts from [employer] to optimize the interface.  I have made every reasonable attempt to correct obvious errors, but there may be errors of grammar and possibly content that I did not discover before finalizing the note.    N

## 2022-02-11 NOTE — PATIENT INSTRUCTIONS
-Keep your wound dressing clean, dry, and intact.    -Wound vac may not have any drainage in tube or cannister & it will still be working.   Change cannister if it does become full by pressing tab on side of machine to remove canister and snap on new one. Full canister can be thrown in the trash. If cannister fills with bright red blood - go to ER. Dressing will be changed every MWF at the wound clini.  If you are having issues with your wound VAC, please consider patching leaks, changing the canister, or calling [phone] for troubleshooting. If the wound VAC has been off or un-operational for over 2 hours, call wound care center to inform them and remove all dressings including black foam and replace with normal saline damp gauze.     -Should you experience any significant changes in your wound(s), such as infection (redness, swelling, localized heat, increased pain, fever > 101 F, chills) or have any questions regarding your home care instructions, please contact the wound center at [phone]. If after hours, contact your primary care physician or go to the hospital emergency room.

## 2022-02-16 ENCOUNTER — OFFICE VISIT (OUTPATIENT)
Dept: WOUND CARE | Facility: MEDICAL CENTER | Age: 57
End: 2022-02-16
Attending: SURGERY
Payer: COMMERCIAL

## 2022-02-16 VITALS
RESPIRATION RATE: 16 BRPM | OXYGEN SATURATION: 97 % | HEART RATE: 69 BPM | DIASTOLIC BLOOD PRESSURE: 101 MMHG | SYSTOLIC BLOOD PRESSURE: 167 MMHG | TEMPERATURE: 97.1 F

## 2022-02-16 DIAGNOSIS — T14.8XXA WOUND INFECTION: ICD-10-CM

## 2022-02-16 DIAGNOSIS — S31.109D OPEN WOUND OF ABDOMEN, SUBSEQUENT ENCOUNTER: Primary | ICD-10-CM

## 2022-02-16 DIAGNOSIS — Z98.890 HISTORY OF HERNIA REPAIR: ICD-10-CM

## 2022-02-16 DIAGNOSIS — Z46.89 ENCOUNTER FOR MANAGEMENT OF WOUND VAC: ICD-10-CM

## 2022-02-16 DIAGNOSIS — Z87.19 HISTORY OF HERNIA REPAIR: ICD-10-CM

## 2022-02-16 DIAGNOSIS — L08.9 WOUND INFECTION: ICD-10-CM

## 2022-02-16 PROCEDURE — 97605 NEG PRS WND THER DME<=50SQCM: CPT

## 2022-02-16 PROCEDURE — 99213 OFFICE O/P EST LOW 20 MIN: CPT

## 2022-02-16 PROCEDURE — 99213 OFFICE O/P EST LOW 20 MIN: CPT | Performed by: NURSE PRACTITIONER

## 2022-02-16 ASSESSMENT — ENCOUNTER SYMPTOMS
FEVER: 0
DIARRHEA: 0
PALPITATIONS: 0
VOMITING: 0
SHORTNESS OF BREATH: 0
CHILLS: 0
DIZZINESS: 0
DEPRESSION: 0
CONSTIPATION: 0
NAUSEA: 0
COUGH: 0
BACK PAIN: 0

## 2022-02-16 NOTE — PATIENT INSTRUCTIONS
-You have a wound vac at 125 mmHg continuous pressure with black foam. The dressing will be changed every Monday, Wednesday, and Friday at the wound clinic or with your home health nurse.    -If you are having issues with your wound vac, please consider patching leaks, changing the canister, or calling [phone] for troubleshooting. If the wound vac has been off or non-operational for over 2 hours, call wound care center to inform them and remove all dressings including black foam and replace with gauze moistened with normal saline.     -Should you experience any significant changes in your wound, such as signs of infection (increasing redness, swelling, localized heat, increased pain, fever > 101 F, chills) or have any questions regarding your home care instructions, please contact the wound center at [phone]. If after hours, contact your primary care physician or go to the hospital emergency room.

## 2022-02-16 NOTE — PROGRESS NOTES
Provider Encounter- Full Thickness wound    HISTORY OF PRESENT ILLNESS  Wound History:    START OF CARE IN CLINIC: 1/20/2022    REFERRING PROVIDER: Billy Berrios M.D.     WOUND- Full Thickness Wound   LOCATION: Mid abdomen   HISTORY: Patient with history of multiple hernia repairs with postoperative complications. Healed well after surgery in 2015 or 16. Unfortunately, he developed a small wound over the surgical site in the spring of 2020, which has progressively enlarged.  He was referred to another surgeon, and was told another surgery was not an option. He was caring for the wound himself due to lack of insurance. He was applying antibiotic ointment and nonstick pads. He was also using 2 abdominal binders. Once he obtained insurance coverage again, he requested assistance with this wound. He was referred to [facility] by his primary care provider.    Of note-He has had reactions to the adhesive dressings in the past - pulling off his skin and causing rashes.      Patient was taken to surgery on 1/18/2022, underwent I&D of his abdominal wound with VAC placement.  He was referred back to [facility] postoperatively.    Pertinent Medical History: Obesity, multiple hernia repairs, polycythemia    TOBACCO USE: He has never smoked or use smokeless tobacco    Patient's problem list, allergies, and current medications reviewed and updated in Epic    Interval History:  1/28/2022 : InitialClinic visit with HEBER Merrill FNP-BC, CWOCN, CFCN. Patient returns to clinic after recent surgery. He is feeling well overall, denies fevers, chills, nausea, vomiting, cough or shortness of breath.  He presents today with sutures above and below open wound.  States he will be following up with his surgeon next week, at which time sutures will be removed.  He does have quite a bit of undermining.  May require excision of skin over undermining at some point if wound does not progress.    2/4/2022 : Clinic visit with HEBER Merrill FNP-BC,  ANGELITA, BARNEY.  Billy states he is feeling much better since having his sutures removed earlier this week.  He has cut way back on his pain medication.  He states that his surgeon is pleased with wound progress.  Overall, he is feeling well,denies fevers, chills, nausea, vomiting, cough or shortness of breath.  His appetite is good, and his weight stable.    2/11/2022 : Clinic visit with HEBER Merrill, EDWARD, BARNEY GOLDSTEIN.    Billy continues to feel well overall. States his appetite is normal, his weight has been stable.    2/16/2022: Clinic visit with HEBER Montes.  Billy reports that overall he feels well.  He is somewhat frustrated with the duration of the wound VAC.  We discussed this in clinic and it is best way to promote granulation tissue at this time.  Patient also discussed home health wanted to place collagen in to the wound bed prior to wound VAC placement.  I told him this has not been approved by [facility] and at this time he does not need collagen placed into the wound bed.       REVIEW OF SYSTEMS:   Review of Systems   Constitutional: Negative for chills and fever.   Respiratory: Negative for cough and shortness of breath.    Cardiovascular: Negative for chest pain, palpitations and leg swelling.   Gastrointestinal: Negative for constipation, diarrhea, nausea and vomiting.   Genitourinary: Negative for dysuria.   Musculoskeletal: Negative for back pain and joint pain.   Skin: Negative for rash.   Neurological: Negative for dizziness.   Psychiatric/Behavioral: Negative for depression.       PHYSICAL EXAMINATION:     Physical Exam  Constitutional:       Appearance: Normal appearance.   HENT:      Head: Normocephalic.      Comments: Hard of hearing  Eyes:      Pupils: Pupils are equal, round, and reactive to light.   Cardiovascular:      Rate and Rhythm: Normal rate.   Pulmonary:      Effort: Pulmonary effort is normal.   Abdominal:      Palpations: Abdomen is soft.      Tenderness: There is no  guarding or rebound.      Comments: Prominent ventral hernia   Musculoskeletal:         General: Normal range of motion.   Skin:     General: Skin is warm.      Comments: Full-thickness wound to mid/lower abdomen, sutures above and below  Refer to wound flowsheet and photos   Neurological:      General: No focal deficit present.      Mental Status: He is alert and oriented to person, place, and time.   Psychiatric:         Mood and Affect: Mood normal.         Behavior: Behavior normal.         Thought Content: Thought content normal.         Judgment: Judgment normal.     BP (!) 167/101 Comment: RN notified  Pulse 69   Temp 36.2 °C (97.1 °F)   Resp 16   SpO2 97%      WOUND ASSESSMENT           Negative Pressure Wound Therapy 01/28/22 (Active)   NPWT Pump Mode / Pressure Setting 125 mmHg;Continuous 02/16/22 1100   Dressing Type Black Foam (Regular) 02/16/22 1100   Number of Foam Pieces Used 1 02/16/22 1100   Canister Changed Yes 02/16/22 1100           Wound 12/06/21 Incision Abdomen 2x2s, tegaderm, island (Active)       Wound 01/28/22 Full Thickness Wound Midline Abdomen (Active)   Wound Image   02/16/22 1100   Site Assessment Red;Yellow 02/16/22 1100   Periwound Assessment Scar tissue 02/16/22 1100   Margins Unattached edges 02/16/22 1100   Closure Secondary intention 01/28/22 0900   Drainage Amount Moderate 02/16/22 1100   Drainage Description Serosanguineous 02/16/22 1100   Treatments Cleansed;Topical Lidocaine 02/16/22 1100   Wound Cleansing Puracyn [place] 02/16/22 1100   Periwound Protectant Skin Protectant Wipes to Periwound;Paste Ring;Drape 02/16/22 1100   Dressing Cleansing/Solutions Not Applicable 02/16/22 1100   Dressing Options Wound Vac 02/16/22 1100   Dressing Changed New 02/16/22 1100   Dressing Change/Treatment Frequency Monday, Wednesday, Friday, and As Needed 02/16/22 1100   Non-staged Wound Description Full thickness 02/16/22 1100   Wound Length (cm) 4.7 cm 02/16/22 1100   Wound Width (cm) 4.7  cm 02/16/22 1100   Wound Depth (cm) 1 cm 02/16/22 1100   Wound Surface Area (cm^2) 22.09 cm^2 02/16/22 1100   Wound Volume (cm^3) 22.09 cm^3 02/16/22 1100   Post-Procedure Length (cm) 4.7 cm 02/16/22 1100   Post-Procedure Width (cm) 4.7 cm 02/16/22 1100   Post-Procedure Depth (cm) 1 cm 02/16/22 1100   Post-Procedure Surface Area (cm^2) 22.09 cm^2 02/16/22 1100   Post-Procedure Volume (cm^3) 22.09 cm^3 02/16/22 1100   Wound Healing % -402 02/16/22 1100   Tunneling (cm) 0 cm 02/16/22 1100   Undermining (cm) 1.3 cm 02/16/22 1100   Undermining of Wound, 1st Location From 1 o'clock;To 3 o'clock 02/16/22 1100   Undermining (cm) - 2nd location 0 cm 02/16/22 1100   Undermining of Wound, 2nd Location From 7 o'clock;To 9 o'clock 02/11/22 0845   Wound Odor None 02/16/22 1100   Exposed Structures Adipose;Mesh 02/16/22 1100            PROCEDURE:   - no excisional debridement of the wound bed in clinic today.      Pertinent Labs and Diagnostics:    Labs:     A1c:   Lab Results   Component Value Date/Time    HBA1C 5.5 06/28/2021 02:47 AM          IMAGING: None found in epic    VASCULAR STUDIES: N/A    LAST  WOUND CULTURE:  DATE : 10/6/2021-culture collected in clinic  Culture Result  Abnormal   Staphylococcus aureus   Light growth              ASSESSMENT AND PLAN:   1. Open wound of abdomen, subsequent encounter  Comments: Spontaneous opening of wound over hernia, started as small wound which progressively enlarged.    2/16/2022:   -No excisional debridement of the actual wound bed.  I did take a small piece of mesh that was protruding from the wound bed down to tissue level with forceps and scissors.  -Patient to return to clinic 3 times weekly for assessment, debridement, and VAC dressing change  -Patient to keep the dressing dry, maintain pressure at 125 mmHg.  VAC troubleshooting reviewed in clinic    Wound care: NPWT at 125 mmHg to accelerate granulation, change 3 times per week.    2. Wound infection    2/16/2022: No signs  or symptoms of infection today.  -Monitor each clinic visit  -Pt advised to go to ER for any increased redness, swelling, drainage or odor, or if he develops fever, chills, nausea or vomiting.    3. History of hernia repair  Comments: History of multiple hernia surgery repairs.  Most recent hernia repair and December 2021 with Dr. Berrios.  Patient then underwent surgical ID and partial repair of dehiscence 1/18/2022:    2/16/2022: Recent hernia repair, see above.        PATIENT EDUCATION  - Importance of adequate nutrition for wound healing  -Advised to go to ER for any increased redness, swelling, drainage, or odor, or if patient develops fever, chills, nausea or vomiting.     20 min spent face to face with patient, >50% of time spent counseling, coordinating care, reviewing records, discussing POC, educating patient.  Time spent in excess of procedural time.      Please note that this note may have been created using voice recognition software. I have worked with technical experts from [facility] [employer] to optimize the interface.  I have made every reasonable attempt to correct obvious errors, but there may be errors of grammar and possibly content that I did not discover before finalizing the note.    N

## 2022-02-16 NOTE — PROCEDURES
Wound Care Procedures 2/16/2022:  Wound vac reapplied using once piece of black granufoam. Negative pressure wound therapy resumed at 125mmHg continuous pressure, no leaks noted.

## 2022-02-18 ENCOUNTER — APPOINTMENT (OUTPATIENT)
Dept: WOUND CARE | Facility: MEDICAL CENTER | Age: 57
End: 2022-02-18
Attending: SURGERY
Payer: COMMERCIAL

## 2022-02-22 NOTE — PROGRESS NOTES
Returned [facility] health message from Jeanna Cates reports that the patient has some odor emanating from the wound no signs or symptoms of infection at this time.  Return shortness call and informed her that last clinical appointment the patient's wound did not appear infected which was last Wednesday.  We will see the patient again in clinic this Wednesday and determine if further assessment with culture or antibiotics is needed.

## 2022-02-23 ENCOUNTER — OFFICE VISIT (OUTPATIENT)
Dept: WOUND CARE | Facility: MEDICAL CENTER | Age: 57
End: 2022-02-23
Attending: SURGERY
Payer: COMMERCIAL

## 2022-02-23 ENCOUNTER — HOSPITAL ENCOUNTER (OUTPATIENT)
Facility: MEDICAL CENTER | Age: 57
End: 2022-02-23
Attending: NURSE PRACTITIONER
Payer: COMMERCIAL

## 2022-02-23 VITALS
DIASTOLIC BLOOD PRESSURE: 91 MMHG | SYSTOLIC BLOOD PRESSURE: 143 MMHG | OXYGEN SATURATION: 95 % | HEART RATE: 40 BPM | RESPIRATION RATE: 18 BRPM | TEMPERATURE: 98.4 F

## 2022-02-23 DIAGNOSIS — T14.8XXA PAIN ASSOCIATED WITH WOUND: ICD-10-CM

## 2022-02-23 DIAGNOSIS — T14.8XXA WOUND INFECTION: ICD-10-CM

## 2022-02-23 DIAGNOSIS — Z46.89 ENCOUNTER FOR MANAGEMENT OF WOUND VAC: ICD-10-CM

## 2022-02-23 DIAGNOSIS — R52 PAIN ASSOCIATED WITH WOUND: ICD-10-CM

## 2022-02-23 DIAGNOSIS — T81.31XA DEHISCENCE OF POSTOPERATIVE WOUND OF ABDOMEN: ICD-10-CM

## 2022-02-23 DIAGNOSIS — S31.109D OPEN WOUND OF ABDOMEN, SUBSEQUENT ENCOUNTER: ICD-10-CM

## 2022-02-23 DIAGNOSIS — L08.9 WOUND INFECTION: ICD-10-CM

## 2022-02-23 PROCEDURE — 97605 NEG PRS WND THER DME<=50SQCM: CPT

## 2022-02-23 PROCEDURE — 87077 CULTURE AEROBIC IDENTIFY: CPT

## 2022-02-23 PROCEDURE — 99213 OFFICE O/P EST LOW 20 MIN: CPT

## 2022-02-23 PROCEDURE — 87070 CULTURE OTHR SPECIMN AEROBIC: CPT

## 2022-02-23 PROCEDURE — 99213 OFFICE O/P EST LOW 20 MIN: CPT | Performed by: NURSE PRACTITIONER

## 2022-02-23 PROCEDURE — 87205 SMEAR GRAM STAIN: CPT

## 2022-02-23 PROCEDURE — 87076 CULTURE ANAEROBE IDENT EACH: CPT

## 2022-02-23 PROCEDURE — 87186 SC STD MICRODIL/AGAR DIL: CPT

## 2022-02-23 ASSESSMENT — ENCOUNTER SYMPTOMS
PALPITATIONS: 0
NAUSEA: 0
DIZZINESS: 0
BACK PAIN: 0
COUGH: 0
CONSTIPATION: 0
FEVER: 0
SHORTNESS OF BREATH: 0
DIARRHEA: 0
VOMITING: 0
DEPRESSION: 0
CHILLS: 0

## 2022-02-23 NOTE — PROGRESS NOTES
Provider Encounter- Full Thickness wound    HISTORY OF PRESENT ILLNESS  Wound History:    START OF CARE IN CLINIC: 1/20/2022    REFERRING PROVIDER: Billy Berrios M.D.     WOUND- Full Thickness Wound   LOCATION: Mid abdomen   HISTORY: Patient with history of multiple hernia repairs with postoperative complications. Healed well after surgery in 2015 or 16. Unfortunately, he developed a small wound over the surgical site in the spring of 2020, which has progressively enlarged.  He was referred to another surgeon, and was told another surgery was not an option. He was caring for the wound himself due to lack of insurance. He was applying antibiotic ointment and nonstick pads. He was also using 2 abdominal binders. Once he obtained insurance coverage again, he requested assistance with this wound. He was referred to [facility] by his primary care provider.    Of note-He has had reactions to the adhesive dressings in the past - pulling off his skin and causing rashes.      Patient was taken to surgery on 1/18/2022, underwent I&D of his abdominal wound with VAC placement.  He was referred back to [facility] postoperatively.    Pertinent Medical History: Obesity, multiple hernia repairs, polycythemia    TOBACCO USE: He has never smoked or use smokeless tobacco    Patient's problem list, allergies, and current medications reviewed and updated in Epic    Interval History:  1/28/2022 : InitialClinic visit with HEBER Merrill FNP-BC, CWOCN, CFCN. Patient returns to clinic after recent surgery. He is feeling well overall, denies fevers, chills, nausea, vomiting, cough or shortness of breath.  He presents today with sutures above and below open wound.  States he will be following up with his surgeon next week, at which time sutures will be removed.  He does have quite a bit of undermining.  May require excision of skin over undermining at some point if wound does not progress.    2/4/2022 : Clinic visit with HEBER Merrill FNP-BC,  BARNEY GOLDSTEIN.  Billy states he is feeling much better since having his sutures removed earlier this week.  He has cut way back on his pain medication.  He states that his surgeon is pleased with wound progress.  Overall, he is feeling well,denies fevers, chills, nausea, vomiting, cough or shortness of breath.  His appetite is good, and his weight stable.    2/11/2022 : Clinic visit with HEBER Merrill, EDWARD, BARNEY GOLDSTEIN.    Billy continues to feel well overall. States his appetite is normal, his weight has been stable.    2/16/2022: Clinic visit with HEBER Montes.  Billy reports that overall he feels well.  He is somewhat frustrated with the duration of the wound VAC.  We discussed this in clinic and it is best way to promote granulation tissue at this time.  Patient also discussed home health wanted to place collagen in to the wound bed prior to wound VAC placement.  I told him this has not been approved by [facility] and at this time he does not need collagen placed into the wound bed.    2/23/2022: Clinic visit with HEBER Montes.  Billy reports that he is concerned about a possible wound infection.  He has been talking with his home health nurse who has contacted me about a odor emanating from the wound.  The periwound area is without erythema patient is afebrile there is not increased purulent drainage.  I suspect that the odor is due to the fact that the patient has a large open wound with wound VAC therapy.  Due to overabundance (by the patient) of concern and caution I have taken a wound culture.         REVIEW OF SYSTEMS:   Review of Systems   Constitutional: Negative for chills and fever.   Respiratory: Negative for cough and shortness of breath.    Cardiovascular: Negative for chest pain, palpitations and leg swelling.   Gastrointestinal: Negative for constipation, diarrhea, nausea and vomiting.   Genitourinary: Negative for dysuria.   Musculoskeletal: Negative for back pain and joint  pain.   Skin: Negative for rash.   Neurological: Negative for dizziness.   Psychiatric/Behavioral: Negative for depression.       PHYSICAL EXAMINATION:     Physical Exam  Constitutional:       Appearance: Normal appearance.   HENT:      Head: Normocephalic.      Comments: Hard of hearing  Eyes:      Pupils: Pupils are equal, round, and reactive to light.   Cardiovascular:      Rate and Rhythm: Normal rate.   Pulmonary:      Effort: Pulmonary effort is normal.   Abdominal:      Palpations: Abdomen is soft.      Tenderness: There is no guarding or rebound.      Comments: Prominent ventral hernia   Musculoskeletal:         General: Normal range of motion.   Skin:     General: Skin is warm.      Comments: Full-thickness wound to mid/lower abdomen, sutures above and below  Refer to wound flowsheet and photos   Neurological:      General: No focal deficit present.      Mental Status: He is alert and oriented to person, place, and time.   Psychiatric:         Mood and Affect: Mood normal.         Behavior: Behavior normal.         Thought Content: Thought content normal.         Judgment: Judgment normal.     /91 Comment: RN notified  Pulse (!) 40 Comment: RN notified  Temp 36.9 °C (98.4 °F)   Resp 18   SpO2 95%      WOUND ASSESSMENT            Negative Pressure Wound Therapy 01/28/22 (Active)   NPWT Pump Mode / Pressure Setting 125 mmHg;Continuous 02/23/22 1015   Dressing Type Black Foam (Regular) 02/23/22 1015   Number of Foam Pieces Used 1 02/23/22 1015   Canister Changed Yes 02/23/22 1015           Wound 12/06/21 Incision Abdomen 2x2s, tegaderm, island (Active)       Wound 01/28/22 Full Thickness Wound Midline Abdomen (Active)   Wound Image   02/23/22 1015   Site Assessment Red;Yellow 02/23/22 1015   Periwound Assessment Denuded;Scar tissue 02/23/22 1015   Margins Unattached edges 02/23/22 1015   Closure Secondary intention 01/28/22 0900   Drainage Amount Moderate 02/23/22 1015   Drainage Description  Serosanguineous 02/23/22 1015   Treatments Cleansed 02/23/22 1015   Wound Cleansing Puracyn [place] 02/23/22 1015   Periwound Protectant Skin Protectant Wipes to Periwound;Drape 02/23/22 1015   Dressing Cleansing/Solutions Not Applicable 02/23/22 1015   Dressing Options Hydrofiber Silver;Wound Vac 02/23/22 1015   Dressing Changed New 02/23/22 1015   Dressing Change/Treatment Frequency Monday, Wednesday, Friday, and As Needed 02/23/22 1015   Non-staged Wound Description Full thickness 02/23/22 1015   Wound Length (cm) 4.3 cm 02/23/22 1015   Wound Width (cm) 4.7 cm 02/23/22 1015   Wound Depth (cm) 1 cm 02/23/22 1015   Wound Surface Area (cm^2) 20.21 cm^2 02/23/22 1015   Wound Volume (cm^3) 20.21 cm^3 02/23/22 1015   Post-Procedure Length (cm) 4.3 cm 02/23/22 1015   Post-Procedure Width (cm) 4.7 cm 02/23/22 1015   Post-Procedure Depth (cm) 1 cm 02/23/22 1015   Post-Procedure Surface Area (cm^2) 20.21 cm^2 02/23/22 1015   Post-Procedure Volume (cm^3) 20.21 cm^3 02/23/22 1015   Wound Healing % -359 02/23/22 1015   Tunneling (cm) 0 cm 02/23/22 1015   Undermining (cm) 2.3 cm 02/23/22 1015   Undermining of Wound, 1st Location From 1 o'clock;To 3 o'clock 02/23/22 1015   Undermining (cm) - 2nd location 0 cm 02/16/22 1100   Undermining of Wound, 2nd Location From 7 o'clock;To 9 o'clock 02/11/22 0845   Wound Odor None 02/23/22 1015   Exposed Structures Adipose;Mesh 02/23/22 1015            PROCEDURE:   - no excisional debridement performed in clinic today.      Pertinent Labs and Diagnostics:    Labs:     A1c:   Lab Results   Component Value Date/Time    HBA1C 5.5 06/28/2021 02:47 AM          IMAGING: None found in epic    VASCULAR STUDIES: N/A    LAST  WOUND CULTURE:  DATE : 10/6/2021-culture collected in clinic  Culture Result  Abnormal   Staphylococcus aureus   Light growth              ASSESSMENT AND PLAN:   1. Open wound of abdomen, subsequent encounter  Comments: Spontaneous opening of wound over hernia, started as small  wound which progressively enlarged.    2/23/2022:   -No excisional debridement of the actual wound bed.  I did remove a very small piece of dissolvable suture that was protruding out of the granulation tissue.  -Patient to return to clinic weekly for assessment by provider debridement if necessary and replacement of wound VAC  -Denuded tissue superior to the open wound this was covered with Hydrofiber silver prior to drape placement to help dry out the area.  -Patient to keep the dressing dry, maintain pressure at 125 mmHg.  VAC troubleshooting reviewed in clinic    Wound care: NPWT at 125 mmHg to accelerate granulation, change 3 times per week.    2. Wound infection    2/23/2022: See interval history above.  -Culture taken in clinic today.  -Monitor each clinic visit  -Pt advised to go to ER for any increased redness, swelling, drainage or odor, or if he develops fever, chills, nausea or vomiting.    3. History of hernia repair  Comments: History of multiple hernia surgery repairs.  Most recent hernia repair and December 2021 with Dr. Berrios.  Patient then underwent surgical ID and partial repair of dehiscence 1/18/2022:    2/23/2022: Recent hernia repair, see above.        PATIENT EDUCATION  - Importance of adequate nutrition for wound healing  -Advised to go to ER for any increased redness, swelling, drainage, or odor, or if patient develops fever, chills, nausea or vomiting.     20 min spent face to face with patient, >50% of time spent counseling, coordinating care, reviewing records, discussing POC, educating patient.  Time spent in excess of procedural time.      Please note that this note may have been created using voice recognition software. I have worked with technical experts from [employer] to optimize the interface.  I have made every reasonable attempt to correct obvious errors, but there may be errors of grammar and possibly content that I did not discover before finalizing the note.    N

## 2022-02-24 LAB
GRAM STN SPEC: NORMAL
SIGNIFICANT IND 70042: NORMAL
SITE SITE: NORMAL
SOURCE SOURCE: NORMAL

## 2022-02-27 LAB
BACTERIA WND AEROBE CULT: ABNORMAL
GRAM STN SPEC: ABNORMAL
SIGNIFICANT IND 70042: ABNORMAL
SITE SITE: ABNORMAL
SOURCE SOURCE: ABNORMAL

## 2022-03-01 NOTE — PROGRESS NOTES
Patient culture came back positive with moderate staph aureus will correlate clinically with the wound bed and periwound area along with assessment of the patient.  Mr. Stephenson had an appointment today with Dr. Berrios his surgeon and patient will be seen on the clinic on Wednesday will review at that time.

## 2022-03-02 ENCOUNTER — OFFICE VISIT (OUTPATIENT)
Dept: WOUND CARE | Facility: MEDICAL CENTER | Age: 57
End: 2022-03-02
Attending: SURGERY
Payer: COMMERCIAL

## 2022-03-02 DIAGNOSIS — S31.109D OPEN WOUND OF ABDOMEN, SUBSEQUENT ENCOUNTER: Primary | ICD-10-CM

## 2022-03-02 NOTE — PROGRESS NOTES
Patient called and late cancelled his appointment today. Spoke with pt to discuss his treatment and culture results. He was seen by the surgeon, Dr. Berrios, yesterday who plans to perform revision of abdominal wound with plastic assistance next Wednesday per patient. Pt states he was told that Dr. Berrios would be prescribing antibiotics one week before and one week after surgery.

## 2022-03-04 ENCOUNTER — PRE-ADMISSION TESTING (OUTPATIENT)
Dept: ADMISSIONS | Facility: MEDICAL CENTER | Age: 57
DRG: 909 | End: 2022-03-04
Attending: SURGERY
Payer: COMMERCIAL

## 2022-03-04 DIAGNOSIS — Z01.812 PRE-OPERATIVE LABORATORY EXAMINATION: ICD-10-CM

## 2022-03-04 LAB
ANION GAP SERPL CALC-SCNC: 10 MMOL/L (ref 7–16)
BUN SERPL-MCNC: 20 MG/DL (ref 8–22)
CALCIUM SERPL-MCNC: 9.4 MG/DL (ref 8.4–10.2)
CHLORIDE SERPL-SCNC: 107 MMOL/L (ref 96–112)
CO2 SERPL-SCNC: 24 MMOL/L (ref 20–33)
CREAT SERPL-MCNC: 1.02 MG/DL (ref 0.5–1.4)
ERYTHROCYTE [DISTWIDTH] IN BLOOD BY AUTOMATED COUNT: 44.7 FL (ref 35.9–50)
EST. AVERAGE GLUCOSE BLD GHB EST-MCNC: 103 MG/DL
GLUCOSE SERPL-MCNC: 88 MG/DL (ref 65–99)
HBA1C MFR BLD: 5.2 % (ref 4–5.6)
HCT VFR BLD AUTO: 49.1 % (ref 42–52)
HGB BLD-MCNC: 15.9 G/DL (ref 14–18)
MCH RBC QN AUTO: 29 PG (ref 27–33)
MCHC RBC AUTO-ENTMCNC: 32.4 G/DL (ref 33.7–35.3)
MCV RBC AUTO: 89.4 FL (ref 81.4–97.8)
PLATELET # BLD AUTO: 258 K/UL (ref 164–446)
PMV BLD AUTO: 11.9 FL (ref 9–12.9)
POTASSIUM SERPL-SCNC: 4.4 MMOL/L (ref 3.6–5.5)
RBC # BLD AUTO: 5.49 M/UL (ref 4.7–6.1)
SODIUM SERPL-SCNC: 141 MMOL/L (ref 135–145)
WBC # BLD AUTO: 8.7 K/UL (ref 4.8–10.8)

## 2022-03-04 PROCEDURE — 85027 COMPLETE CBC AUTOMATED: CPT

## 2022-03-04 PROCEDURE — 83036 HEMOGLOBIN GLYCOSYLATED A1C: CPT

## 2022-03-04 PROCEDURE — C9803 HOPD COVID-19 SPEC COLLECT: HCPCS

## 2022-03-04 PROCEDURE — U0003 INFECTIOUS AGENT DETECTION BY NUCLEIC ACID (DNA OR RNA); SEVERE ACUTE RESPIRATORY SYNDROME CORONAVIRUS 2 (SARS-COV-2) (CORONAVIRUS DISEASE [COVID-19]), AMPLIFIED PROBE TECHNIQUE, MAKING USE OF HIGH THROUGHPUT TECHNOLOGIES AS DESCRIBED BY CMS-2020-01-R: HCPCS

## 2022-03-04 PROCEDURE — U0005 INFEC AGEN DETEC AMPLI PROBE: HCPCS

## 2022-03-04 PROCEDURE — 80048 BASIC METABOLIC PNL TOTAL CA: CPT

## 2022-03-04 PROCEDURE — 36415 COLL VENOUS BLD VENIPUNCTURE: CPT

## 2022-03-04 RX ORDER — IBUPROFEN 200 MG
200 TABLET ORAL EVERY 6 HOURS PRN
COMMUNITY

## 2022-03-04 ASSESSMENT — FIBROSIS 4 INDEX: FIB4 SCORE: 1.62

## 2022-03-04 NOTE — PREPROCEDURE INSTRUCTIONS
"Preadmit appointment: \" Preparing for your Procedure information\" sheet given to patient with verbal and written instructions. Patient instructed to continue prescribed medications through the day before surgery, instructed to take the following medications the day of surgery per anesthesia protocol: Tylenol if needed  Verbal and written instructions on covid symptoms to watch for given to patient and patient instructed to call MD if any symptoms develop prior to surgery/procedure. Pt reports itching and wakes up agitated/ thrashing, pt reports he is friends with his anesthesia and has discussed this.   "

## 2022-03-05 LAB
SARS-COV-2 RNA RESP QL NAA+PROBE: NOTDETECTED
SPECIMEN SOURCE: NORMAL

## 2022-03-08 ENCOUNTER — TELEPHONE (OUTPATIENT)
Dept: WOUND CARE | Facility: MEDICAL CENTER | Age: 57
End: 2022-03-08
Payer: COMMERCIAL

## 2022-03-08 NOTE — TELEPHONE ENCOUNTER
Billy is going  to surgery for I and D of wound on 3/10 as mesh is visible in wound bed. Dr. Berrios will put in new referral if needed for wound care.

## 2022-03-09 ENCOUNTER — HOSPITAL ENCOUNTER (INPATIENT)
Facility: MEDICAL CENTER | Age: 57
LOS: 2 days | DRG: 909 | End: 2022-03-11
Attending: SURGERY | Admitting: SURGERY
Payer: COMMERCIAL

## 2022-03-09 ENCOUNTER — ANESTHESIA EVENT (OUTPATIENT)
Dept: SURGERY | Facility: MEDICAL CENTER | Age: 57
DRG: 909 | End: 2022-03-09
Payer: COMMERCIAL

## 2022-03-09 ENCOUNTER — ANESTHESIA (OUTPATIENT)
Dept: SURGERY | Facility: MEDICAL CENTER | Age: 57
DRG: 909 | End: 2022-03-09
Payer: COMMERCIAL

## 2022-03-09 DIAGNOSIS — D72.10 EOSINOPHILIA, UNSPECIFIED TYPE: ICD-10-CM

## 2022-03-09 DIAGNOSIS — G89.18 POST-OP PAIN: ICD-10-CM

## 2022-03-09 PROBLEM — K43.9 VENTRAL HERNIA WITHOUT OBSTRUCTION OR GANGRENE: Status: ACTIVE | Noted: 2022-03-09

## 2022-03-09 PROCEDURE — 160036 HCHG PACU - EA ADDL 30 MINS PHASE I: Performed by: SURGERY

## 2022-03-09 PROCEDURE — 96365 THER/PROPH/DIAG IV INF INIT: CPT

## 2022-03-09 PROCEDURE — 160035 HCHG PACU - 1ST 60 MINS PHASE I: Performed by: SURGERY

## 2022-03-09 PROCEDURE — 700111 HCHG RX REV CODE 636 W/ 250 OVERRIDE (IP): Performed by: SURGERY

## 2022-03-09 PROCEDURE — 160042 HCHG SURGERY MINUTES - EA ADDL 1 MIN LEVEL 5: Performed by: SURGERY

## 2022-03-09 PROCEDURE — 700105 HCHG RX REV CODE 258: Performed by: SURGERY

## 2022-03-09 PROCEDURE — A9270 NON-COVERED ITEM OR SERVICE: HCPCS | Performed by: ANESTHESIOLOGY

## 2022-03-09 PROCEDURE — 500064 HCHG BINDER, 4-PANEL MED/LG: Performed by: SURGERY

## 2022-03-09 PROCEDURE — 700111 HCHG RX REV CODE 636 W/ 250 OVERRIDE (IP): Performed by: ANESTHESIOLOGY

## 2022-03-09 PROCEDURE — 0KBL0ZZ EXCISION OF LEFT ABDOMEN MUSCLE, OPEN APPROACH: ICD-10-PCS | Performed by: SURGERY

## 2022-03-09 PROCEDURE — 700101 HCHG RX REV CODE 250: Performed by: ANESTHESIOLOGY

## 2022-03-09 PROCEDURE — 770006 HCHG ROOM/CARE - MED/SURG/GYN SEMI*

## 2022-03-09 PROCEDURE — 700101 HCHG RX REV CODE 250: Performed by: SURGERY

## 2022-03-09 PROCEDURE — 160009 HCHG ANES TIME/MIN: Performed by: SURGERY

## 2022-03-09 PROCEDURE — 64488 TAP BLOCK BI INJECTION: CPT | Performed by: SURGERY

## 2022-03-09 PROCEDURE — C1781 MESH (IMPLANTABLE): HCPCS | Performed by: SURGERY

## 2022-03-09 PROCEDURE — G0378 HOSPITAL OBSERVATION PER HR: HCPCS

## 2022-03-09 PROCEDURE — 160048 HCHG OR STATISTICAL LEVEL 1-5: Performed by: SURGERY

## 2022-03-09 PROCEDURE — 700102 HCHG RX REV CODE 250 W/ 637 OVERRIDE(OP): Performed by: ANESTHESIOLOGY

## 2022-03-09 PROCEDURE — A9270 NON-COVERED ITEM OR SERVICE: HCPCS | Performed by: SURGERY

## 2022-03-09 PROCEDURE — 501838 HCHG SUTURE GENERAL: Performed by: SURGERY

## 2022-03-09 PROCEDURE — 0WPF0JZ REMOVAL OF SYNTHETIC SUBSTITUTE FROM ABDOMINAL WALL, OPEN APPROACH: ICD-10-PCS | Performed by: SURGERY

## 2022-03-09 PROCEDURE — 160002 HCHG RECOVERY MINUTES (STAT): Performed by: SURGERY

## 2022-03-09 PROCEDURE — 0WUF0JZ SUPPLEMENT ABDOMINAL WALL WITH SYNTHETIC SUBSTITUTE, OPEN APPROACH: ICD-10-PCS | Performed by: SURGERY

## 2022-03-09 PROCEDURE — 700102 HCHG RX REV CODE 250 W/ 637 OVERRIDE(OP): Performed by: SURGERY

## 2022-03-09 PROCEDURE — 160031 HCHG SURGERY MINUTES - 1ST 30 MINS LEVEL 5: Performed by: SURGERY

## 2022-03-09 DEVICE — MESH PHASIX 15.2CM X 20.3 (1EA/CA): Type: IMPLANTABLE DEVICE | Site: ABDOMEN | Status: FUNCTIONAL

## 2022-03-09 RX ORDER — LIDOCAINE HYDROCHLORIDE 20 MG/ML
INJECTION, SOLUTION EPIDURAL; INFILTRATION; INTRACAUDAL; PERINEURAL PRN
Status: DISCONTINUED | OUTPATIENT
Start: 2022-03-09 | End: 2022-03-09 | Stop reason: SURG

## 2022-03-09 RX ORDER — PHENYLEPHRINE HCL IN 0.9% NACL 0.5 MG/5ML
SYRINGE (ML) INTRAVENOUS PRN
Status: DISCONTINUED | OUTPATIENT
Start: 2022-03-09 | End: 2022-03-09 | Stop reason: SURG

## 2022-03-09 RX ORDER — LISINOPRIL AND HYDROCHLOROTHIAZIDE 20; 12.5 MG/1; MG/1
1 TABLET ORAL EVERY MORNING
Status: DISCONTINUED | OUTPATIENT
Start: 2022-03-09 | End: 2022-03-09

## 2022-03-09 RX ORDER — SODIUM CHLORIDE, SODIUM LACTATE, POTASSIUM CHLORIDE, CALCIUM CHLORIDE 600; 310; 30; 20 MG/100ML; MG/100ML; MG/100ML; MG/100ML
INJECTION, SOLUTION INTRAVENOUS CONTINUOUS
Status: ACTIVE | OUTPATIENT
Start: 2022-03-09 | End: 2022-03-09

## 2022-03-09 RX ORDER — ROCURONIUM BROMIDE 10 MG/ML
INJECTION, SOLUTION INTRAVENOUS PRN
Status: DISCONTINUED | OUTPATIENT
Start: 2022-03-09 | End: 2022-03-09 | Stop reason: SURG

## 2022-03-09 RX ORDER — CEFAZOLIN SODIUM 1 G/3ML
INJECTION, POWDER, FOR SOLUTION INTRAMUSCULAR; INTRAVENOUS PRN
Status: DISCONTINUED | OUTPATIENT
Start: 2022-03-09 | End: 2022-03-09 | Stop reason: SURG

## 2022-03-09 RX ORDER — CEFAZOLIN SODIUM IN 0.9 % NACL 2 G/100 ML
2 PLASTIC BAG, INJECTION (ML) INTRAVENOUS EVERY 8 HOURS
Status: DISCONTINUED | OUTPATIENT
Start: 2022-03-09 | End: 2022-03-11 | Stop reason: HOSPADM

## 2022-03-09 RX ORDER — ACETAMINOPHEN 500 MG
1000 TABLET ORAL EVERY 6 HOURS PRN
Status: DISCONTINUED | OUTPATIENT
Start: 2022-03-14 | End: 2022-03-11 | Stop reason: HOSPADM

## 2022-03-09 RX ORDER — OXYCODONE HYDROCHLORIDE 5 MG/1
5 TABLET ORAL
Status: DISCONTINUED | OUTPATIENT
Start: 2022-03-09 | End: 2022-03-11 | Stop reason: HOSPADM

## 2022-03-09 RX ORDER — ACETAMINOPHEN 500 MG
1000 TABLET ORAL ONCE
Status: COMPLETED | OUTPATIENT
Start: 2022-03-09 | End: 2022-03-09

## 2022-03-09 RX ORDER — MIDAZOLAM HYDROCHLORIDE 1 MG/ML
INJECTION INTRAMUSCULAR; INTRAVENOUS PRN
Status: DISCONTINUED | OUTPATIENT
Start: 2022-03-09 | End: 2022-03-09 | Stop reason: SURG

## 2022-03-09 RX ORDER — IBUPROFEN 400 MG/1
800 TABLET ORAL 3 TIMES DAILY PRN
Status: DISCONTINUED | OUTPATIENT
Start: 2022-03-14 | End: 2022-03-11 | Stop reason: HOSPADM

## 2022-03-09 RX ORDER — HYDROMORPHONE HYDROCHLORIDE 1 MG/ML
0.25 INJECTION, SOLUTION INTRAMUSCULAR; INTRAVENOUS; SUBCUTANEOUS
Status: DISCONTINUED | OUTPATIENT
Start: 2022-03-09 | End: 2022-03-11 | Stop reason: HOSPADM

## 2022-03-09 RX ORDER — CALCIUM CARBONATE 500 MG/1
500 TABLET, CHEWABLE ORAL
Status: DISCONTINUED | OUTPATIENT
Start: 2022-03-09 | End: 2022-03-11 | Stop reason: HOSPADM

## 2022-03-09 RX ORDER — DIPHENHYDRAMINE HYDROCHLORIDE 50 MG/ML
12.5 INJECTION INTRAMUSCULAR; INTRAVENOUS
Status: DISCONTINUED | OUTPATIENT
Start: 2022-03-09 | End: 2022-03-09 | Stop reason: HOSPADM

## 2022-03-09 RX ORDER — OXYCODONE HCL 5 MG/5 ML
5 SOLUTION, ORAL ORAL
Status: COMPLETED | OUTPATIENT
Start: 2022-03-09 | End: 2022-03-09

## 2022-03-09 RX ORDER — ONDANSETRON 2 MG/ML
4 INJECTION INTRAMUSCULAR; INTRAVENOUS EVERY 4 HOURS PRN
Status: DISCONTINUED | OUTPATIENT
Start: 2022-03-09 | End: 2022-03-11 | Stop reason: HOSPADM

## 2022-03-09 RX ORDER — SENNOSIDES A AND B 8.6 MG/1
8.6 TABLET, FILM COATED ORAL
Status: DISCONTINUED | OUTPATIENT
Start: 2022-03-09 | End: 2022-03-11 | Stop reason: HOSPADM

## 2022-03-09 RX ORDER — SODIUM CHLORIDE, SODIUM LACTATE, POTASSIUM CHLORIDE, CALCIUM CHLORIDE 600; 310; 30; 20 MG/100ML; MG/100ML; MG/100ML; MG/100ML
INJECTION, SOLUTION INTRAVENOUS CONTINUOUS
Status: DISCONTINUED | OUTPATIENT
Start: 2022-03-09 | End: 2022-03-09 | Stop reason: HOSPADM

## 2022-03-09 RX ORDER — HYDROCHLOROTHIAZIDE 12.5 MG/1
12.5 TABLET ORAL
Status: DISCONTINUED | OUTPATIENT
Start: 2022-03-09 | End: 2022-03-11 | Stop reason: HOSPADM

## 2022-03-09 RX ORDER — GABAPENTIN 300 MG/1
300 CAPSULE ORAL ONCE
Status: COMPLETED | OUTPATIENT
Start: 2022-03-09 | End: 2022-03-09

## 2022-03-09 RX ORDER — MIDAZOLAM HYDROCHLORIDE 1 MG/ML
1 INJECTION INTRAMUSCULAR; INTRAVENOUS
Status: DISCONTINUED | OUTPATIENT
Start: 2022-03-09 | End: 2022-03-09 | Stop reason: HOSPADM

## 2022-03-09 RX ORDER — LISINOPRIL 20 MG/1
20 TABLET ORAL
Status: DISCONTINUED | OUTPATIENT
Start: 2022-03-09 | End: 2022-03-11 | Stop reason: HOSPADM

## 2022-03-09 RX ORDER — OXYCODONE HYDROCHLORIDE 5 MG/1
2.5 TABLET ORAL
Status: DISCONTINUED | OUTPATIENT
Start: 2022-03-09 | End: 2022-03-11 | Stop reason: HOSPADM

## 2022-03-09 RX ORDER — HALOPERIDOL 5 MG/ML
1 INJECTION INTRAMUSCULAR
Status: DISCONTINUED | OUTPATIENT
Start: 2022-03-09 | End: 2022-03-09 | Stop reason: HOSPADM

## 2022-03-09 RX ORDER — DOCUSATE SODIUM 100 MG/1
100 CAPSULE, LIQUID FILLED ORAL 2 TIMES DAILY
Status: DISCONTINUED | OUTPATIENT
Start: 2022-03-09 | End: 2022-03-11 | Stop reason: HOSPADM

## 2022-03-09 RX ORDER — MAGNESIUM SULFATE HEPTAHYDRATE 40 MG/ML
INJECTION, SOLUTION INTRAVENOUS PRN
Status: DISCONTINUED | OUTPATIENT
Start: 2022-03-09 | End: 2022-03-09 | Stop reason: SURG

## 2022-03-09 RX ORDER — HYDROMORPHONE HYDROCHLORIDE 2 MG/ML
INJECTION, SOLUTION INTRAMUSCULAR; INTRAVENOUS; SUBCUTANEOUS PRN
Status: DISCONTINUED | OUTPATIENT
Start: 2022-03-09 | End: 2022-03-09 | Stop reason: SURG

## 2022-03-09 RX ORDER — ACETAMINOPHEN 500 MG
1000 TABLET ORAL EVERY 6 HOURS
Status: DISCONTINUED | OUTPATIENT
Start: 2022-03-09 | End: 2022-03-11 | Stop reason: HOSPADM

## 2022-03-09 RX ORDER — DEXTROSE MONOHYDRATE, SODIUM CHLORIDE, AND POTASSIUM CHLORIDE 50; 1.49; 4.5 G/1000ML; G/1000ML; G/1000ML
INJECTION, SOLUTION INTRAVENOUS CONTINUOUS
Status: DISPENSED | OUTPATIENT
Start: 2022-03-09 | End: 2022-03-09

## 2022-03-09 RX ORDER — BUPIVACAINE HYDROCHLORIDE AND EPINEPHRINE 2.5; 5 MG/ML; UG/ML
INJECTION, SOLUTION EPIDURAL; INFILTRATION; INTRACAUDAL; PERINEURAL
Status: COMPLETED | OUTPATIENT
Start: 2022-03-09 | End: 2022-03-09

## 2022-03-09 RX ORDER — IBUPROFEN 400 MG/1
800 TABLET ORAL 3 TIMES DAILY
Status: DISCONTINUED | OUTPATIENT
Start: 2022-03-09 | End: 2022-03-11 | Stop reason: HOSPADM

## 2022-03-09 RX ORDER — HEPARIN SODIUM 5000 [USP'U]/ML
5000 INJECTION, SOLUTION INTRAVENOUS; SUBCUTANEOUS EVERY 8 HOURS
Status: DISCONTINUED | OUTPATIENT
Start: 2022-03-10 | End: 2022-03-11 | Stop reason: HOSPADM

## 2022-03-09 RX ORDER — DEXAMETHASONE SODIUM PHOSPHATE 4 MG/ML
INJECTION, SOLUTION INTRA-ARTICULAR; INTRALESIONAL; INTRAMUSCULAR; INTRAVENOUS; SOFT TISSUE
Status: COMPLETED | OUTPATIENT
Start: 2022-03-09 | End: 2022-03-09

## 2022-03-09 RX ORDER — CELECOXIB 200 MG/1
400 CAPSULE ORAL ONCE
Status: COMPLETED | OUTPATIENT
Start: 2022-03-09 | End: 2022-03-09

## 2022-03-09 RX ORDER — DEXAMETHASONE SODIUM PHOSPHATE 4 MG/ML
INJECTION, SOLUTION INTRA-ARTICULAR; INTRALESIONAL; INTRAMUSCULAR; INTRAVENOUS; SOFT TISSUE PRN
Status: DISCONTINUED | OUTPATIENT
Start: 2022-03-09 | End: 2022-03-09 | Stop reason: SURG

## 2022-03-09 RX ORDER — OXYCODONE HCL 5 MG/5 ML
10 SOLUTION, ORAL ORAL
Status: COMPLETED | OUTPATIENT
Start: 2022-03-09 | End: 2022-03-09

## 2022-03-09 RX ORDER — ONDANSETRON 2 MG/ML
INJECTION INTRAMUSCULAR; INTRAVENOUS PRN
Status: DISCONTINUED | OUTPATIENT
Start: 2022-03-09 | End: 2022-03-09 | Stop reason: SURG

## 2022-03-09 RX ADMIN — LISINOPRIL 20 MG: 20 TABLET ORAL at 15:56

## 2022-03-09 RX ADMIN — ACETAMINOPHEN 1000 MG: 500 TABLET, FILM COATED ORAL at 17:06

## 2022-03-09 RX ADMIN — OXYCODONE HYDROCHLORIDE 10 MG: 5 SOLUTION ORAL at 13:57

## 2022-03-09 RX ADMIN — LIDOCAINE HYDROCHLORIDE 0.5 ML: 10 INJECTION, SOLUTION EPIDURAL; INFILTRATION; INTRACAUDAL; PERINEURAL at 09:19

## 2022-03-09 RX ADMIN — GABAPENTIN 300 MG: 300 CAPSULE ORAL at 09:08

## 2022-03-09 RX ADMIN — CELECOXIB 400 MG: 200 CAPSULE ORAL at 09:08

## 2022-03-09 RX ADMIN — WATER 2 G: 1000 INJECTION, SOLUTION INTRAVENOUS at 19:50

## 2022-03-09 RX ADMIN — Medication 100 MCG: at 12:52

## 2022-03-09 RX ADMIN — Medication 100 MCG: at 12:48

## 2022-03-09 RX ADMIN — PROPOFOL 150 MG: 10 INJECTION, EMULSION INTRAVENOUS at 11:56

## 2022-03-09 RX ADMIN — FENTANYL CITRATE 50 MCG: 50 INJECTION, SOLUTION INTRAMUSCULAR; INTRAVENOUS at 13:36

## 2022-03-09 RX ADMIN — DEXAMETHASONE SODIUM PHOSPHATE 4 MG: 4 INJECTION, SOLUTION INTRAMUSCULAR; INTRAVENOUS at 11:56

## 2022-03-09 RX ADMIN — HYDROMORPHONE HYDROCHLORIDE 0.5 MG: 2 INJECTION INTRAMUSCULAR; INTRAVENOUS; SUBCUTANEOUS at 12:13

## 2022-03-09 RX ADMIN — SODIUM CHLORIDE, POTASSIUM CHLORIDE, SODIUM LACTATE AND CALCIUM CHLORIDE: 600; 310; 30; 20 INJECTION, SOLUTION INTRAVENOUS at 09:20

## 2022-03-09 RX ADMIN — BUPIVACAINE HYDROCHLORIDE AND EPINEPHRINE 60 ML: 2.5; 5 INJECTION, SOLUTION EPIDURAL; INFILTRATION; INTRACAUDAL; PERINEURAL at 13:19

## 2022-03-09 RX ADMIN — ONDANSETRON 4 MG: 2 INJECTION INTRAMUSCULAR; INTRAVENOUS at 11:56

## 2022-03-09 RX ADMIN — ACETAMINOPHEN 1000 MG: 500 TABLET, FILM COATED ORAL at 09:08

## 2022-03-09 RX ADMIN — HYDROCHLOROTHIAZIDE 12.5 MG: 12.5 TABLET ORAL at 15:56

## 2022-03-09 RX ADMIN — Medication 100 MCG: at 12:22

## 2022-03-09 RX ADMIN — ROCURONIUM BROMIDE 50 MG: 10 INJECTION, SOLUTION INTRAVENOUS at 11:56

## 2022-03-09 RX ADMIN — OXYCODONE HYDROCHLORIDE 5 MG: 5 TABLET ORAL at 19:17

## 2022-03-09 RX ADMIN — Medication 100 MCG: at 12:19

## 2022-03-09 RX ADMIN — DEXAMETHASONE SODIUM PHOSPHATE 4 MG: 4 INJECTION, SOLUTION INTRA-ARTICULAR; INTRALESIONAL; INTRAMUSCULAR; INTRAVENOUS; SOFT TISSUE at 13:19

## 2022-03-09 RX ADMIN — LIDOCAINE HYDROCHLORIDE 100 MG: 20 INJECTION, SOLUTION EPIDURAL; INFILTRATION; INTRACAUDAL; PERINEURAL at 11:56

## 2022-03-09 RX ADMIN — MIDAZOLAM HYDROCHLORIDE 2 MG: 1 INJECTION, SOLUTION INTRAMUSCULAR; INTRAVENOUS at 11:56

## 2022-03-09 RX ADMIN — CEFAZOLIN 3 G: 330 INJECTION, POWDER, FOR SOLUTION INTRAMUSCULAR; INTRAVENOUS at 11:56

## 2022-03-09 RX ADMIN — PROPOFOL 50 MG: 10 INJECTION, EMULSION INTRAVENOUS at 13:06

## 2022-03-09 RX ADMIN — FENTANYL CITRATE 50 MCG: 50 INJECTION, SOLUTION INTRAMUSCULAR; INTRAVENOUS at 12:34

## 2022-03-09 RX ADMIN — HYDROMORPHONE HYDROCHLORIDE 0.5 MG: 2 INJECTION INTRAMUSCULAR; INTRAVENOUS; SUBCUTANEOUS at 12:39

## 2022-03-09 RX ADMIN — MAGNESIUM SULFATE HEPTAHYDRATE 4 G: 40 INJECTION, SOLUTION INTRAVENOUS at 12:05

## 2022-03-09 RX ADMIN — IBUPROFEN 800 MG: 400 TABLET, FILM COATED ORAL at 17:06

## 2022-03-09 RX ADMIN — DOCUSATE SODIUM 100 MG: 100 CAPSULE, LIQUID FILLED ORAL at 17:06

## 2022-03-09 RX ADMIN — FENTANYL CITRATE 50 MCG: 50 INJECTION, SOLUTION INTRAMUSCULAR; INTRAVENOUS at 13:57

## 2022-03-09 RX ADMIN — FENTANYL CITRATE 100 MCG: 50 INJECTION, SOLUTION INTRAMUSCULAR; INTRAVENOUS at 11:56

## 2022-03-09 RX ADMIN — DEXTROSE MONOHYDRATE, SODIUM CHLORIDE, AND POTASSIUM CHLORIDE: 50; 4.5; 1.49 INJECTION, SOLUTION INTRAVENOUS at 15:58

## 2022-03-09 ASSESSMENT — COGNITIVE AND FUNCTIONAL STATUS - GENERAL
EATING MEALS: A LITTLE
DRESSING REGULAR LOWER BODY CLOTHING: A LITTLE
MOBILITY SCORE: 19
PERSONAL GROOMING: A LITTLE
TOILETING: A LITTLE
STANDING UP FROM CHAIR USING ARMS: A LITTLE
MOVING TO AND FROM BED TO CHAIR: A LITTLE
MOVING FROM LYING ON BACK TO SITTING ON SIDE OF FLAT BED: A LITTLE
DRESSING REGULAR UPPER BODY CLOTHING: A LITTLE
SUGGESTED CMS G CODE MODIFIER MOBILITY: CK
DAILY ACTIVITIY SCORE: 18
CLIMB 3 TO 5 STEPS WITH RAILING: A LITTLE
SUGGESTED CMS G CODE MODIFIER DAILY ACTIVITY: CK
HELP NEEDED FOR BATHING: A LITTLE
WALKING IN HOSPITAL ROOM: A LITTLE

## 2022-03-09 ASSESSMENT — LIFESTYLE VARIABLES
CONSUMPTION TOTAL: NEGATIVE
TOTAL SCORE: 0
HAVE YOU EVER FELT YOU SHOULD CUT DOWN ON YOUR DRINKING: NO
TOTAL SCORE: 0
EVER FELT BAD OR GUILTY ABOUT YOUR DRINKING: NO
EVER HAD A DRINK FIRST THING IN THE MORNING TO STEADY YOUR NERVES TO GET RID OF A HANGOVER: NO
ON A TYPICAL DAY WHEN YOU DRINK ALCOHOL HOW MANY DRINKS DO YOU HAVE: 0
HOW MANY TIMES IN THE PAST YEAR HAVE YOU HAD 5 OR MORE DRINKS IN A DAY: 0
AVERAGE NUMBER OF DAYS PER WEEK YOU HAVE A DRINK CONTAINING ALCOHOL: 0
ALCOHOL_USE: NO
TOTAL SCORE: 0
HAVE PEOPLE ANNOYED YOU BY CRITICIZING YOUR DRINKING: NO

## 2022-03-09 ASSESSMENT — PATIENT HEALTH QUESTIONNAIRE - PHQ9
SUM OF ALL RESPONSES TO PHQ9 QUESTIONS 1 AND 2: 0
1. LITTLE INTEREST OR PLEASURE IN DOING THINGS: NOT AT ALL
2. FEELING DOWN, DEPRESSED, IRRITABLE, OR HOPELESS: NOT AT ALL

## 2022-03-09 ASSESSMENT — PAIN SCALES - GENERAL: PAIN_LEVEL: 4

## 2022-03-09 ASSESSMENT — PAIN DESCRIPTION - PAIN TYPE
TYPE: SURGICAL PAIN

## 2022-03-09 ASSESSMENT — FIBROSIS 4 INDEX: FIB4 SCORE: 1.03

## 2022-03-09 NOTE — OR NURSING
[id number] Patient allergies and NPO status verified, home medication reconciliation completed and belongings secured. Surgical site verified with patient. Patient verbalizes understanding of pain scale, expected course of stay and plan of care; patient and family state verbal understanding at this time. IV access established. Sequentials in place as ordered.

## 2022-03-09 NOTE — ANESTHESIA PREPROCEDURE EVALUATION
Case: [id number] Date/Time: 03/09/22 0945    Procedures:       CLOSURE, WOUND - FLAP CLOSURE OF ABDOMINAL WALL      LAPAROTOMY, EXPLORATORY - DEBRIDEMENT OF ABDOMINAL WALL      REMOVAL, FOREIGN BODY - POSSIBLE PARTIAL REMOVAL OF EXPOSED HERNIA MESH    Pre-op diagnosis: OPEN WOUND OF ANTERIOR ABDOMINAL WALL, BILATERAL    Location: [facility] OR 04 / SURGERY [facility]    Surgeons: Dudley Newell M.D.; Billy Berrios M.D.          Relevant Problems      (positive) JAGJIT (acute kidney injury) (HCC)      Other   (positive) Hyperglycemia   (positive) Obesity   (positive) Polycythemia       Physical Exam    Airway   Mallampati: II  TM distance: >3 FB  Neck ROM: full       Cardiovascular - normal exam  Rhythm: regular  Rate: normal  (-) murmur     Dental - normal exam           Pulmonary - normal exam  Breath sounds clear to auscultation     Abdominal    Neurological - normal exam                 Anesthesia Plan    ASA 2       Plan - general       Airway plan will be ETT    (Possible epidural for postoperative analgesia)      Induction: intravenous      Pertinent diagnostic labs and testing reviewed    Informed Consent:    Anesthetic plan and risks discussed with patient.

## 2022-03-09 NOTE — ANESTHESIA POSTPROCEDURE EVALUATION
Patient: Billy Stephenson    Procedure Summary     Date: 03/09/22 Room / Location:  OR  / SURGERY [facility]    Anesthesia Start: 1151 Anesthesia Stop: 1341    Procedures:       CLOSURE, WOUND - FLAP CLOSURE OF ABDOMINAL WALL (Abdomen)      LAPAROTOMY, EXPLORATORY - DEBRIDEMENT OF ABDOMINAL WALL (Abdomen)      REMOVAL, FOREIGN BODY - POSSIBLE PARTIAL REMOVAL OF EXPOSED HERNIA MESH (Abdomen) Diagnosis: (OPEN WOUND OF ANTERIOR ABDOMINAL WALL, BILATERAL)    Surgeons: Dudley Newell M.D.; Billy Berrios M.D. Responsible Provider: Giancarlo Perez M.D.    Anesthesia Type: general, peripheral nerve block ASA Status: 2          Final Anesthesia Type: general, peripheral nerve block  Last vitals  BP   Blood Pressure: (!) 171/107    Temp   36.5 °C (97.7 °F)    Pulse   80   Resp   16    SpO2   96 %      Anesthesia Post Evaluation    Patient location during evaluation: PACU  Patient participation: complete - patient participated  Level of consciousness: awake and alert  Pain score: 4    Airway patency: patent  Anesthetic complications: no  Cardiovascular status: hemodynamically stable  Respiratory status: acceptable  Hydration status: euvolemic    PONV: none          No complications documented.     Nurse Pain Score: 3 (NPRS)

## 2022-03-09 NOTE — ANESTHESIA PROCEDURE NOTES
Airway    Date/Time: 3/9/2022 11:59 AM  Performed by: Giancarlo Perez M.D.  Authorized by: Giancarlo Perez M.D.     Location:  OR  Urgency:  Elective  Indications for Airway Management:  Anesthesia      Spontaneous Ventilation: absent    Sedation Level:  Deep  Preoxygenated: Yes    Patient Position:  Sniffing  Mask Difficulty Assessment:  0 - not attempted  Final Airway Type:  Endotracheal airway  Final Endotracheal Airway:  ETT  Cuffed: Yes    Technique Used for Successful ETT Placement:  Direct laryngoscopy    Insertion Site:  Oral  Blade Type:  Cheryle  Laryngoscope Blade/Videolaryngoscope Blade Size:  4  ETT Size (mm):  7.5  Measured from:  Teeth  ETT to Teeth (cm):  23  Placement Verified by: auscultation and capnometry    Cormack-Lehane Classification:  Grade IIb - view of arytenoids or posterior of glottis only  Number of Attempts at Approach:  1

## 2022-03-09 NOTE — OR NURSING
1338 Pt arrived to PACU from OR via gurney. On 6L of O2 via mask. Pt states that pain is 5/10 and is tolerable. Minimal sanguinous drainage on MINDY dressing.  Prevena in place. Abdominal binder placed on patient.    1357 Pt c/o 7/10 pain. Medicated per MAR.     1410 Report given to Michael QUARLES.

## 2022-03-09 NOTE — ANESTHESIA TIME REPORT
Anesthesia Start and Stop Event Times     Date Time Event    3/9/2022 1151 Anesthesia Start     1341 Anesthesia Stop        Responsible Staff  03/09/22    Name Role Begin End    Giancarlo Perez M.D. Anesth 1151 1341        Preop Diagnosis (Free Text):  Pre-op Diagnosis     OPEN WOUND OF ANTERIOR ABDOMINAL WALL, BILATERAL        Preop Diagnosis (Codes):    Premium Reason  Non-Premium    Comments:

## 2022-03-09 NOTE — H&P
General Surgery Consult (ACS)  [facility]          CHIEF COMPLAINT:  wound.    HISTORY OF PRESENT ILLNESS: The patient is a 56 y.o. male, who presents with an open wound post hernia repair with mesh.   Wound not healing due to exposed mesh.  Plan debridement with possible closure, possible bridge with phasix.     Referring Provider: Agustina    BMI:Body mass index is 36.84 kg/m².     PAST MEDICAL HISTORY:  has a past medical history of Anesthesia (11/23/2021), Gout (2021), Hypertension, Pain (01/17/2022), and Pneumonia (08/2021).     PAST SURGICAL HISTORY:  has a past surgical history that includes ventral hernia repair (5/29/2012); exploratory laparotomy (5/29/2012); ventral hernia repair robotic xi (Bilateral, 12/6/2021); neg press wound tx < 50 cm (N/A, 1/18/2022); irrigation & debridement general (N/A, 1/18/2022); wound closure neuro (N/A, 1/18/2022); ventral hernia repair (1991); ventral hernia repair (1998); ventral hernia repair (2016); inguinal hernia repair child (Right); and foot surgery (Right, 1967).     ALLERGIES:   Allergies   Allergen Reactions   • Sulfa Drugs Rash and Swelling     Rash and swelling; family hx of this     • Bactrim Ds Hives   • Other Drug Rash and Itching     Silk tape  Paper tape ok     • Tetanus Toxoid-Diphtheria Toxoid [Decavac] Swelling     Localized swelling   • Tetanus-Diphtheria Toxoids Td         CURRENT MEDICATIONS:   Home Medications     Reviewed by Shaunna Engel R.N. (Registered Nurse) on 03/09/22 at 0840  Med List Status: Complete   Medication Last Dose Status   acetaminophen (TYLENOL) 500 MG Tab 3/8/2022 Active   benzonatate (TESSALON) 100 MG Cap weeks ago Active   colchicine (COLCRYS) 0.6 MG Tab never Active   docusate sodium (COLACE) 100 MG Cap 3/6/2022 Active   ibuprofen (MOTRIN) 200 MG Tab weeks ago Active   lisinopril-hydrochlorothiazide (PRINZIDE) 20-12.5 MG per tablet 3/8/2022 Active   methocarbamol (ROBAXIN) 500 MG Tab month Active   naproxen  "(NAPROSYN) 250 MG Tab over a week Active                FAMILY HISTORY: History reviewed. No pertinent family history.     SOCIAL HISTORY:   Social History     Tobacco Use   • Smoking status: Never Smoker   • Smokeless tobacco: Never Used   Vaping Use   • Vaping Use: Never used   Substance and Sexual Activity   • Alcohol use: Yes     Comment: 2 drinks/week   • Drug use: Not Currently   • Sexual activity: Not on file       REVIEW OF SYSTEMS: Comprehensive review of systems was negative aside from wound    PHYSICAL EXAMINATION:     GENERAL: The patient is in no distress.   VITAL SIGNS: BP (!) 171/107   Pulse 80   Temp 36.5 °C (97.7 °F) (Temporal)   Resp 16   Ht 1.803 m (5' 11\")   Wt 120 kg (264 lb 1.8 oz)   SpO2 96%   HEAD AND NECK: Demonstrates symmetric, reactive pupils. Extraocular muscles   are intact. Nares and oropharynx are clear.   NECK: Supple. No adenopathy.  CHEST:No respiratory distress.    CARDIOVASCULAR: Regular rate. The extremities are well perfused.   ABDOMEN: Vac in place today.   EXTREMITIES: Examination of the upper and lower extremities demonstrates no cyanosis edema or clubbing.  NEUROLOGIC: Alert & oriented x 3, Normal motor function, Normal sensory function, No focal deficits noted.    LABORATORY VALUES:                      IMAGING:   No orders to display       Problem List:    Patient Active Problem List    Diagnosis Date Noted   • Abdominal pain, acute 05/30/2012   • Incarcerated ventral hernia 05/30/2012   • Polycythemia 05/30/2012   • Hyperglycemia 05/30/2012   • Incisional hernia, without obstruction or gangrene 12/06/2021   • Severe sepsis (HCC) 06/29/2021   • Diarrhea 06/28/2021   • JAGJIT (acute kidney injury) ([facility]) 06/28/2021   • Leukocytosis 06/27/2021   • Lactic acidosis 06/27/2021   • Small bowel obstruction (HCC) 06/27/2021   • Obesity 06/27/2021       IMPRESSION AND PLAN:     1.Open wound, exposed mesh.    1.  The patient will be taken to the operating room for washout, " debridement, possible flap closure, possible bridge with absorbable mesh and replacement of wound vac. The surgical conduct was explained. Potential complications including but not limited to infection, bleeding, damage to adjacent structures, anesthetic complications were discussed in detail. Questions were elicited and answered to his satisfaction. He understands the rationale for surgery and elects to proceed.      2.  Dr. Newell to perform as co surgeon/primary surgeon.              ___________________________________   Billy Berrios M.D.    [facility]  [phone]    DD: 3/9/2022 DT: 9:29 AM

## 2022-03-09 NOTE — H&P
"Surgery General History & Physical Note    Date  3/9/2022    Primary Care Physician  Claudia Sepulveda P.A.-C.    CC  Recurrent ventral incisional hernia    HPI  This is a 56 y.o. male who presented with infected abdominal wall mesh s/p ETEp B/L TAR. Patient currently managed with wound VAC and abx. Scheduled for debridemenrt of his abdominal wall and possible myocutaneous flaps or bridging absorbably mesh. Risks and benefits of procedure, as well as expected need for mutliplt surgeries went over in detail and patient wishes to proceed    Past Medical History:   Diagnosis Date   • Anesthesia 11/23/2021    \"bad attitude\" when waking up, violent wake up; mother has same hx of this; itching   • Gout 2021   • Hypertension    • Pain 01/17/2022    abd., 1/10   • Pneumonia 08/2021       Past Surgical History:   Procedure Laterality Date   • WI NEG PRESS WOUND TX < 50 CM N/A 1/18/2022    Procedure: APPLICATION OR REPLACEMENT,WOUND VAC-PLACEMENT OF NEGATIVE PRESSURE WOUND THERAPY DEVICE;  Surgeon: Billy Berrios M.D.;  Location: [facility];  Service: General   • IRRIGATION & DEBRIDEMENT GENERAL N/A 1/18/2022    Procedure: IRRIGATION AND DEBRIDEMENT, WOUND-ABDOMINAL WALL WOUND;  Surgeon: Billy Berrios M.D.;  Location: [facility];  Service: General   • WOUND CLOSURE NEURO N/A 1/18/2022    Procedure: CLOSURE, WOUND-PARTIAL;  Surgeon: Billy Berrios M.D.;  Location: [facility];  Service: General   • VENTRAL HERNIA REPAIR ROBOTIC XI Bilateral 12/6/2021    Procedure: REPAIR, HERNIA, VENTRAL, ROBOT-ASSISTED, USING DA SHERRY XI - TRANSVERSUS ABDOMINIS RELEASE RECURRENT, WITH MESH;  Surgeon: Billy Berrios M.D.;  Location: [facility];  Service: Gen Robotic   • VENTRAL HERNIA REPAIR  2016   • VENTRAL HERNIA REPAIR  5/29/2012    Performed by TERRY SHANKS at [facility] ORS   • EXPLORATORY LAPAROTOMY  5/29/2012    Performed by TERRY SHANKS at [facility] ORS   • " VENTRAL HERNIA REPAIR  1998   • VENTRAL HERNIA REPAIR  1991    x2 surgeries   • FOOT SURGERY Right 1967    excision of extra toe   • INGUINAL HERNIA REPAIR CHILD Right        Current Facility-Administered Medications   Medication Dose Route Frequency Provider Last Rate Last Admin   • lidocaine (XYLOCAINE) 1 % injection 0.5 mL  0.5 mL Intradermal Once PRN Dudley Newell M.D.   0.5 mL at 03/09/22 0919   • lactated ringers infusion   Intravenous Continuous Dudley Newell M.D. 10 mL/hr at 03/09/22 0920 New Bag at 03/09/22 0920       Social History     Socioeconomic History   • Marital status:      Spouse name: Not on file   • Number of children: Not on file   • Years of education: Not on file   • Highest education level: Not on file   Occupational History   • Not on file   Tobacco Use   • Smoking status: Never Smoker   • Smokeless tobacco: Never Used   Vaping Use   • Vaping Use: Never used   Substance and Sexual Activity   • Alcohol use: Yes     Comment: 2 drinks/week   • Drug use: Not Currently   • Sexual activity: Not on file   Other Topics Concern   • Not on file   Social History Narrative   • Not on file     Social Determinants of Health     Financial Resource Strain: Not on file   Food Insecurity: Not on file   Transportation Needs: Not on file   Physical Activity: Not on file   Stress: Not on file   Social Connections: Not on file   Intimate Partner Violence: Not on file   Housing Stability: Not on file       History reviewed. No pertinent family history.    Allergies  Sulfa drugs, Bactrim ds, Other drug, Tetanus toxoid-diphtheria toxoid [decavac], and Tetanus-diphtheria toxoids td    Review of Systems  Negative except for abdominal wound    Physical Exam  NAD, appears well  Neck supple  Regular heart rate  Normal respiratory effort  Abdomen soft, ND, VAC in place over midline wound with mild hyperemiaExt ROM grossly intact  Skin pink and warm    Vital Signs  Blood Pressure: (!) 171/107    Temperature: 36.5 °C (97.7 °F)   Pulse: 80   Respiration: 16   Pulse Oximetry: 96 %       Labs:                    Radiology:  No orders to display         Assessment/Plan:  57 y/o male s/p recent Robotic ETEP B/L TAR, now with infected mesh and midline abdominal wound. Patient has intact posterior fascial leaflet from recent reconstruction, and also has anterior component release still available to him. I explained that he would first need debridement and resolution of his infection, followed by either anterior release and/or laparoscopic sublay mesh. Patient understands he is at very high risk for recurrence and possible further wound complications    Procedure(s):  CLOSURE, WOUND - FLAP CLOSURE OF ABDOMINAL WALL  LAPAROTOMY, EXPLORATORY - DEBRIDEMENT OF ABDOMINAL WALL  REMOVAL, FOREIGN BODY - POSSIBLE PARTIAL REMOVAL OF EXPOSED HERNIA MESH

## 2022-03-09 NOTE — OR NURSING
1405: Report rec'd from CARLIE Rothman RN. Pt is awake, using cell phone. States pain is tolerable at 6/10.     1458: No change. Meets criteria for room.

## 2022-03-09 NOTE — ANESTHESIA PROCEDURE NOTES
Peripheral Block    Date/Time: 3/9/2022 1:19 PM  Performed by: Giancarlo Perez M.D.  Authorized by: Giancarlo Perez M.D.     Patient Location:  OR  Start Time:  3/9/2022 1:19 PM  End Time:  3/9/2022 1:27 PM  Reason for Block: at surgeon's request and post-op pain management ONLY    patient identified, IV checked, site marked, risks and benefits discussed, surgical consent, monitors and equipment checked, pre-op evaluation and timeout performed    Patient Position:  Supine  Prep: ChloraPrep    Monitoring:  Heart rate, continuous pulse ox and cardiac monitor  Block Region:  Trunk  Trunk - Block Type:  Abdominal plane block - TAP block    Laterality:  Bilateral  Procedures: ultrasound guided  Image captured, interpreted and electronically stored.  Strength:  1 %  Dose:  3 ml  Block Type:  Single-shot  Needle Length:  100mm  Needle Gauge:  21 G  Needle Localization:  Ultrasound guidance  Injection Assessment:  Negative aspiration for heme, incremental injection and local visualized surrounding nerve on ultrasound

## 2022-03-09 NOTE — OR SURGEON
OP Note    PreOp Diagnosis: recurrent incisional hernia, infected hernia mesh    PostOp Diagnosis:  1. Open abdominal wound  2. Infected ventral hernia mesh  3. Recurrent ventral hernia    Procedure(s):  1.  Debridement skin, subcutaneous tissue, muscle and fascia from anterior abdominal wall 22 cm x 19 cm  2.  Removal of infected mesh from anterior abdominal wall  3.  Recurrent ventral hernia repair with absorbable mesh  4.  Implantation of phasic's bioabsorbable medium sized mesh for ventral hernia repair   5.  Fasciocutaneous advancement flap trunk right side 35 cm x 17 cm  6.  Fasciocutaneous advancement flap trunk left side 35 cm x 17 cm  7.  Placement of negative pressure incisional wound VAC    Wound Class: Dirty or Infected    Surgeon(s):  Dudley Newell M.D.    Assistant(s):  Billy Berrios M.D.    Use of an assistant was required for assistance with intraoperative decision making, retraction and visualization as well as closure of incisions.    Anesthesiologist/Type of Anesthesia:  Anesthesiologist: Giancarlo Perez M.D./General    Surgical Staff:  Circulator: Jessica Ireland R.N.  Relief Circulator: Heather C Cogan, R.N.  Scrub Person: Lety Rodriguez    Specimens removed if any:  * No specimens in log *    Estimated Blood Loss: 100cc    Findings: infected mesh, mostly intact posterior fascial layer    Complications: none observed  The patient was met in the preoperative holding area where all of the potential risks and benefits of the procedure were discussed in detail with the patient. All of his questions were answered to his satisfaction and informed consent was signed. The patient was taken back to the operating room and placed supine on the operating room table. General endotracheal anesthesia was induced and all of the patients pressure points were padded appropriately. The patients abdomen was prepped and draped in the usual sterile fashion.  A timeout was performed which correctly  identified the patient and the procedure being performed and preoperative antibiotics were given according to SCIP guidelines. Postoperative bilateral BRANDT blocks were performed by Dr. Mondragon.    The operation was begun with inspection of the wound.  The abdominal wall was with minimal cellulitis.  There is a previous midline vertical laparotomy incision in addition to a horizontal mid abdominal incision.  There is an open wound just above the umbilicus in the midline with exposed mesh.  There is a foul odor with purulent drainage.  Digital probing of the wound reveals undermining of at least 5 to 10 cm in all directions.  Previous bilateral robotic TAR as well as previous anterior component separation had been performed.  The exposed mesh was grasped with a Cira and sharply debrided with Shankar scissors.  The wound edges were then debrided back sharply using a combination of Metzenbaum scissors and electrocautery.  The nearby skin, subcutaneous tissue, underlying rectus muscle and fascia debrided away from the open infected wound.  All compromised tissue was excised.  Infected mesh was then traced back in the retrofascial plane until incorporation was visualized.  This was done circumferentially until there is no obviously exposed or infected mesh visible.  During this debridement there appeared to be 3 separate meshes that were present within the wound bed.  It is unknown when or where these meshes have been placed as the patient had previously undergone 8 hernia surgeries.  Upon excision of the infected mesh, the intra-abdominal peritoneal cavity was entered in the left lower portion of the wound.  The wound bed was then thoroughly irrigated with 3 L of warm sterile saline using a pulse lavage device.  The wound bed then appeared clean electrocautery was used to achieve hemostasis.  I then performed a  preserving bilateral abdominal wall advancement flaps in order to reapproximate skin.  At the patient's  last hernia repair he had midline skin excision performed which allowed very little laxity today.  Bilateral  preserving abdominal wall fasciocutaneous flaps were then dissected out laterally along the abdominal musculature.  The left lower quadrant fascial defect was then reapproximated using 0 PDS sutures.  This was reapproximated primarily and reconstructed a defect approximately 10 cm in length.  A medium bioabsorbable uncoated phasic's mesh was then placed as an onlay and secured with horizontal mattress interrupted 0 and 2-0 PDS sutures.  The wound was then irrigated once again.  There was somewhat denuded but present anterior fascia present along the majority of the incision.  A 10 Indonesian flat fluted drain was then placed in the retrofascial plane and this anterior leaflet of fascia reapproximated with interrupted figure-of-eight 0 Vicryl sutures.  This was able to be closed ultimately over the entirety of the incision and completely tied the phasic's mesh from the subcutaneous space.  Interrupted deep dermal 3-0 Vicryl sutures were then used to reapproximate the dermis and staples were used to reapproximate skin.  An incisional wound VAC was applied.    The patient tolerated the procedure well and was extubated at the conclusion of the case without incident. All of the sponge, needle and instrument counts were correct at the conclusion of the case. After he was awoken from anesthesia he was taken to the recovery room in stable condition.        3/9/2022 1:13 PM Dudley Newell M.D.

## 2022-03-10 LAB
ANION GAP SERPL CALC-SCNC: 11 MMOL/L (ref 7–16)
BUN SERPL-MCNC: 21 MG/DL (ref 8–22)
CALCIUM SERPL-MCNC: 8.7 MG/DL (ref 8.4–10.2)
CHLORIDE SERPL-SCNC: 103 MMOL/L (ref 96–112)
CO2 SERPL-SCNC: 23 MMOL/L (ref 20–33)
CREAT SERPL-MCNC: 0.96 MG/DL (ref 0.5–1.4)
ERYTHROCYTE [DISTWIDTH] IN BLOOD BY AUTOMATED COUNT: 42.4 FL (ref 35.9–50)
GLUCOSE SERPL-MCNC: 162 MG/DL (ref 65–99)
HCT VFR BLD AUTO: 43.5 % (ref 42–52)
HGB BLD-MCNC: 14.4 G/DL (ref 14–18)
MCH RBC QN AUTO: 28.9 PG (ref 27–33)
MCHC RBC AUTO-ENTMCNC: 33.1 G/DL (ref 33.7–35.3)
MCV RBC AUTO: 87.2 FL (ref 81.4–97.8)
PLATELET # BLD AUTO: 230 K/UL (ref 164–446)
PMV BLD AUTO: 11.5 FL (ref 9–12.9)
POTASSIUM SERPL-SCNC: 4.6 MMOL/L (ref 3.6–5.5)
RBC # BLD AUTO: 4.99 M/UL (ref 4.7–6.1)
SODIUM SERPL-SCNC: 137 MMOL/L (ref 135–145)
WBC # BLD AUTO: 15.9 K/UL (ref 4.8–10.8)

## 2022-03-10 PROCEDURE — 36415 COLL VENOUS BLD VENIPUNCTURE: CPT

## 2022-03-10 PROCEDURE — 700105 HCHG RX REV CODE 258: Performed by: SURGERY

## 2022-03-10 PROCEDURE — 96366 THER/PROPH/DIAG IV INF ADDON: CPT

## 2022-03-10 PROCEDURE — 700101 HCHG RX REV CODE 250: Performed by: SURGERY

## 2022-03-10 PROCEDURE — G0378 HOSPITAL OBSERVATION PER HR: HCPCS

## 2022-03-10 PROCEDURE — 96372 THER/PROPH/DIAG INJ SC/IM: CPT

## 2022-03-10 PROCEDURE — 85027 COMPLETE CBC AUTOMATED: CPT

## 2022-03-10 PROCEDURE — 770006 HCHG ROOM/CARE - MED/SURG/GYN SEMI*

## 2022-03-10 PROCEDURE — 80048 BASIC METABOLIC PNL TOTAL CA: CPT

## 2022-03-10 PROCEDURE — A9270 NON-COVERED ITEM OR SERVICE: HCPCS | Performed by: SURGERY

## 2022-03-10 PROCEDURE — 700111 HCHG RX REV CODE 636 W/ 250 OVERRIDE (IP): Performed by: SURGERY

## 2022-03-10 PROCEDURE — 700102 HCHG RX REV CODE 250 W/ 637 OVERRIDE(OP): Performed by: SURGERY

## 2022-03-10 RX ADMIN — WATER 2 G: 1000 INJECTION, SOLUTION INTRAVENOUS at 13:11

## 2022-03-10 RX ADMIN — WATER 2 G: 1000 INJECTION, SOLUTION INTRAVENOUS at 22:24

## 2022-03-10 RX ADMIN — HEPARIN SODIUM 5000 UNITS: 5000 INJECTION INTRAVENOUS; SUBCUTANEOUS at 06:15

## 2022-03-10 RX ADMIN — HEPARIN SODIUM 5000 UNITS: 5000 INJECTION INTRAVENOUS; SUBCUTANEOUS at 22:24

## 2022-03-10 RX ADMIN — ACETAMINOPHEN 1000 MG: 500 TABLET, FILM COATED ORAL at 17:53

## 2022-03-10 RX ADMIN — ACETAMINOPHEN 1000 MG: 500 TABLET, FILM COATED ORAL at 06:18

## 2022-03-10 RX ADMIN — OXYCODONE HYDROCHLORIDE 2.5 MG: 5 TABLET ORAL at 00:32

## 2022-03-10 RX ADMIN — OXYCODONE HYDROCHLORIDE 5 MG: 5 TABLET ORAL at 13:03

## 2022-03-10 RX ADMIN — IBUPROFEN 800 MG: 400 TABLET, FILM COATED ORAL at 17:53

## 2022-03-10 RX ADMIN — ACETAMINOPHEN 1000 MG: 500 TABLET, FILM COATED ORAL at 00:31

## 2022-03-10 RX ADMIN — HEPARIN SODIUM 5000 UNITS: 5000 INJECTION INTRAVENOUS; SUBCUTANEOUS at 13:13

## 2022-03-10 RX ADMIN — OXYCODONE HYDROCHLORIDE 2.5 MG: 5 TABLET ORAL at 21:02

## 2022-03-10 RX ADMIN — DOCUSATE SODIUM 100 MG: 100 CAPSULE, LIQUID FILLED ORAL at 06:15

## 2022-03-10 RX ADMIN — WATER 2 G: 1000 INJECTION, SOLUTION INTRAVENOUS at 06:13

## 2022-03-10 RX ADMIN — OXYCODONE HYDROCHLORIDE 2.5 MG: 5 TABLET ORAL at 06:28

## 2022-03-10 RX ADMIN — ACETAMINOPHEN 1000 MG: 500 TABLET, FILM COATED ORAL at 23:36

## 2022-03-10 RX ADMIN — IBUPROFEN 800 MG: 400 TABLET, FILM COATED ORAL at 06:14

## 2022-03-10 RX ADMIN — OXYCODONE HYDROCHLORIDE 5 MG: 5 TABLET ORAL at 16:36

## 2022-03-10 RX ADMIN — IBUPROFEN 800 MG: 400 TABLET, FILM COATED ORAL at 12:57

## 2022-03-10 RX ADMIN — ACETAMINOPHEN 1000 MG: 500 TABLET, FILM COATED ORAL at 13:03

## 2022-03-10 RX ADMIN — DOCUSATE SODIUM 100 MG: 100 CAPSULE, LIQUID FILLED ORAL at 17:54

## 2022-03-10 ASSESSMENT — PAIN DESCRIPTION - PAIN TYPE
TYPE: SURGICAL PAIN

## 2022-03-10 ASSESSMENT — PAIN SCALES - WONG BAKER: WONGBAKER_NUMERICALRESPONSE: HURTS JUST A LITTLE BIT

## 2022-03-10 NOTE — PROGRESS NOTES
Received report from WILLAM Tracy. Safety precautions in place. Bed locked and low. Offered assist. Call light and belongings within reach.

## 2022-03-10 NOTE — PROGRESS NOTES
4 Eyes Skin Assessment Completed by raquel and hay    Head WDL  Ears WDL  Nose WDL  Mouth WDL  Neck WDL  Breast/Chest WDL  Shoulder Blades WDL  Spine WDL  (R) Arm/Elbow/Hand WDL  (L) Arm/Elbow/Hand WDL  Abdomen Incision  Groin WDL  Scrotum/Coccyx/Buttocks WDL  (R) Leg WDL  (L) Leg WDL  (R) Heel/Foot/Toe WDL  (L) Heel/Foot/Toe WDL          Devices In Places Blood Pressure Cuff and Pulse Ox      Interventions In Place Pillows and Pressure Redistribution Mattress    Possible Skin Injury No    Pictures Uploaded Into Epic N/A  Wound Consult Placed N/A  RN Wound Prevention Protocol Ordered No

## 2022-03-10 NOTE — CARE PLAN
The patient is Watcher - Medium risk of patient condition declining or worsening    Shift Goals  Clinical Goals: Postop care, education, mobility, pain control, pulmonary toilet  Patient Goals: Feel better    Progress made toward(s) clinical / shift goals:    Problem: Pain - Standard  Goal: Alleviation of pain or a reduction in pain to the patient’s comfort goal  Outcome: Progressing     Problem: Knowledge Deficit - Standard  Goal: Patient and family/care givers will demonstrate understanding of plan of care, disease process/condition, diagnostic tests and medications  Outcome: Progressing     Problem: Infection - Standard  Goal: Patient will remain free from infection  Outcome: Progressing       Patient is not progressing towards the following goals:

## 2022-03-10 NOTE — PROGRESS NOTES
Surgery  POD#1  Doing well, pain controlled  MINDY scant, bloody  Mobilize  Continue drain  Continue jaye  Continue IV abx  Possible discharge tomorrow if doing well

## 2022-03-10 NOTE — DISCHARGE PLANNING
Anticipated Discharge Disposition:   Home     Action:   Chart review complete     No anticipated discharge needs at this time.     RN CM will continue to follow.      Barriers to Discharge:   Medical clearance     Plan:   HCM will continue to follow and assist with discharge needs.

## 2022-03-10 NOTE — CARE PLAN
The patient is stable    Shift Goals  Clinical Goals: feel better, wound healing  Patient Goals: relief from pain    Progress made toward(s) clinical / shift goals:  progressing      Problem: Pain - Standard  Goal: Alleviation of pain or a reduction in pain to the patient’s comfort goal  Outcome: Progressing     Problem: Knowledge Deficit - Standard  Goal: Patient and family/care givers will demonstrate understanding of plan of care, disease process/condition, diagnostic tests and medications  Outcome: Progressing     Problem: Infection - Standard  Goal: Patient will remain free from infection  Outcome: Progressing

## 2022-03-10 NOTE — DISCHARGE PLANNING
Anticipated Discharge Disposition: Home    Action: LSW met with pt at bedside. Pt states he lives alone and is self-employed and has not worked since December 21. Pt requested possible assistance with rent if possible. Pt states his brother has helped in the past with rent. Pt was previously on service with [employer].    Barriers to Discharge: None    Plan: LSW to look into rental assistance programs. LSW to follow and assist as needed.        Care Transition Team Assessment    Information Source  Information Given By: Patient  Who is responsible for making decisions for patient? : Patient         Elopement Risk  Legal Hold: No  Ambulatory or Self Mobile in Wheelchair: Yes  Elopement Risk: Not at Risk for Elopement    Interdisciplinary Discharge Planning  Primary Care Physician: Claudia Sepulveda PAC  Lives with - Patient's Self Care Capacity: Alone and Able to Care For Self  Patient or legal guardian wants to designate a caregiver: No  Support Systems: Family Member(s),Friends / Neighbors  Housing / Facility: Mobile Home  Patient Prefers to be Discharged to:: home  Durable Medical Equipment: Not Applicable    Discharge Preparedness  What is your plan after discharge?: Home health care  Prior Functional Level: Ambulatory,Independent with Activities of Daily Living  Difficulity with ADLs: None  Difficulity with IADLs: None    Functional Assesment  Prior Functional Level: Ambulatory,Independent with Activities of Daily Living    Finances  Financial Barriers to Discharge: No    Vision / Hearing Impairment  Vision Impairment : No  Hearing Impairment : No              Domestic Abuse  Have you ever been the victim of abuse or violence?: No  Physical Abuse or Sexual Abuse: No  Verbal Abuse or Emotional Abuse: No  Possible Abuse/Neglect Reported to:: Not Applicable         Discharge Risks or Barriers  Discharge risks or barriers?: No  Patient risk factors: Lack of outside supports,Other (comment) (financial  instability)    Anticipated Discharge Information  Discharge Disposition: D/T to home under A care in anticipation of covered skilled care (06)

## 2022-03-11 VITALS
SYSTOLIC BLOOD PRESSURE: 134 MMHG | BODY MASS INDEX: 36.98 KG/M2 | DIASTOLIC BLOOD PRESSURE: 77 MMHG | HEIGHT: 71 IN | OXYGEN SATURATION: 95 % | RESPIRATION RATE: 16 BRPM | HEART RATE: 67 BPM | TEMPERATURE: 98 F | WEIGHT: 264.11 LBS

## 2022-03-11 PROCEDURE — A9270 NON-COVERED ITEM OR SERVICE: HCPCS | Performed by: SURGERY

## 2022-03-11 PROCEDURE — 700102 HCHG RX REV CODE 250 W/ 637 OVERRIDE(OP): Performed by: SURGERY

## 2022-03-11 PROCEDURE — 96372 THER/PROPH/DIAG INJ SC/IM: CPT

## 2022-03-11 PROCEDURE — 770006 HCHG ROOM/CARE - MED/SURG/GYN SEMI*

## 2022-03-11 PROCEDURE — 700111 HCHG RX REV CODE 636 W/ 250 OVERRIDE (IP): Performed by: SURGERY

## 2022-03-11 RX ORDER — OXYCODONE HYDROCHLORIDE 5 MG/1
5 TABLET ORAL EVERY 4 HOURS PRN
Qty: 30 TABLET | Refills: 0 | Status: SHIPPED | OUTPATIENT
Start: 2022-03-11 | End: 2022-03-18

## 2022-03-11 RX ORDER — CEPHALEXIN 500 MG/1
500 CAPSULE ORAL 4 TIMES DAILY
Qty: 40 CAPSULE | Refills: 0 | Status: SHIPPED | OUTPATIENT
Start: 2022-03-11 | End: 2022-03-21

## 2022-03-11 RX ADMIN — ACETAMINOPHEN 1000 MG: 500 TABLET, FILM COATED ORAL at 06:05

## 2022-03-11 RX ADMIN — HYDROCHLOROTHIAZIDE 12.5 MG: 12.5 TABLET ORAL at 06:04

## 2022-03-11 RX ADMIN — DOCUSATE SODIUM 100 MG: 100 CAPSULE, LIQUID FILLED ORAL at 06:04

## 2022-03-11 RX ADMIN — OXYCODONE HYDROCHLORIDE 5 MG: 5 TABLET ORAL at 12:09

## 2022-03-11 RX ADMIN — OXYCODONE HYDROCHLORIDE 5 MG: 5 TABLET ORAL at 06:03

## 2022-03-11 RX ADMIN — OXYCODONE HYDROCHLORIDE 5 MG: 5 TABLET ORAL at 02:15

## 2022-03-11 RX ADMIN — LISINOPRIL 20 MG: 20 TABLET ORAL at 06:03

## 2022-03-11 RX ADMIN — IBUPROFEN 800 MG: 400 TABLET, FILM COATED ORAL at 06:04

## 2022-03-11 RX ADMIN — IBUPROFEN 800 MG: 400 TABLET, FILM COATED ORAL at 12:09

## 2022-03-11 RX ADMIN — ACETAMINOPHEN 1000 MG: 500 TABLET, FILM COATED ORAL at 12:09

## 2022-03-11 RX ADMIN — WATER 2 G: 1000 INJECTION, SOLUTION INTRAVENOUS at 06:05

## 2022-03-11 RX ADMIN — HEPARIN SODIUM 5000 UNITS: 5000 INJECTION INTRAVENOUS; SUBCUTANEOUS at 06:05

## 2022-03-11 ASSESSMENT — PAIN DESCRIPTION - PAIN TYPE
TYPE: SURGICAL PAIN

## 2022-03-11 NOTE — CARE PLAN
Problem: Pain - Standard  Goal: Alleviation of pain or a reduction in pain to the patient’s comfort goal  Outcome: Progressing   The patient is Stable - Low risk of patient condition declining or worsening    Shift Goals  Clinical Goals: Pain control and ambulation  Patient Goals: Rest and pain control    Progress made toward(s) clinical / shift goals:  Pain medication given when needed.     Patient is not progressing towards the following goals:

## 2022-03-11 NOTE — DISCHARGE PLANNING
Anticipated Discharge Disposition: Home    Action: LSW met with pt at bedside and provided pt with a list of community resources/numbers that may provide assistance with rent.     Barriers to Discharge: None    Plan: LSW to follow and assist as needed.

## 2022-03-11 NOTE — DISCHARGE SUMMARY
Discharge Summary           Date of Admission:3/9/2022     Date of Discharge:3/11/2022    Admission Diagnosis:Infected mesh     [id number]  [id number]     Discharge Diagnosis: Same    Procedures performed: 1.  Mesh extirpation. 2. Repair of recurrent hernia. 3. Phasix mesh placement 4. Drain placement 5.  Fasciocutaenous flaps    Hospital Course: Patient underwent the above procedures.  He tolerated this well.  He was tolerating a diet and had normal and stable vital signs.  He was kept an extra day for IV antibiotics and drain management.  On postoperative day #2 he was discharged home without event.    Condition on discharge: Good    Follow up:   Dr. Berrios in 3 days.  Monday at 0900    Activity:  As tolerated    Discharge Medications: [unfilled]       Dr. Billy Berrios MD, [facility]  [phone]

## 2022-03-11 NOTE — DISCHARGE PLANNING
Anticipated Discharge Disposition:   Home with possible HH    Action:   Chart review complete     Patient previously on service with [facility].     RN CM will continue to follow.      Barriers to Discharge:   Possible HH acceptance   Medical clearance     Plan:   HCM will continue to follow and assist with discharge needs.

## 2022-03-11 NOTE — DISCHARGE INSTRUCTIONS
Surgical Drain Record  Empty your surgical drain as told by your health care provider. Use this form to write down the amount of fluid that has collected in the drainage container. Bring this form with you to your follow-up visits.  Surgical drain #1 location: ___________________    Date __________ Time __________ Amount __________  Date __________ Time __________ Amount __________  Date __________ Time __________ Amount __________  Date __________ Time __________ Amount __________  Date __________ Time __________ Amount __________  Date __________ Time __________ Amount __________  Date __________ Time __________ Amount __________  Date __________ Time __________ Amount __________  Date __________ Time __________ Amount __________  Date __________ Time __________ Amount __________  Date __________ Time __________ Amount __________  Date __________ Time __________ Amount __________  Date __________ Time __________ Amount __________  Date __________ Time __________ Amount __________  Date __________ Time __________ Amount __________  Date __________ Time __________ Amount __________  Date __________ Time __________ Amount __________  Date __________ Time __________ Amount __________  Date __________ Time __________ Amount __________  Date __________ Time __________ Amount __________  Date __________ Time __________ Amount __________  Surgical drain #2 location: ___________________  Date __________ Time __________ Amount __________  Date __________ Time __________ Amount __________  Date __________ Time __________ Amount __________  Date __________ Time __________ Amount __________  Date __________ Time __________ Amount __________  Date __________ Time __________ Amount __________  Date __________ Time __________ Amount __________  Date __________ Time __________ Amount __________  Date __________ Time __________ Amount __________  Date __________ Time __________ Amount __________  Date __________ Time __________  Amount __________  Date __________ Time __________ Amount __________  Date __________ Time __________ Amount __________  Date __________ Time __________ Amount __________  Date __________ Time __________ Amount __________  Date __________ Time __________ Amount __________  Date __________ Time __________ Amount __________  Date __________ Time __________ Amount __________  Date __________ Time __________ Amount __________  Date __________ Time __________ Amount __________  Date __________ Time __________ Amount __________  This information is not intended to replace advice given to you by your health care provider. Make sure you discuss any questions you have with your health care provider.  Document Released: 09/24/2018 Document Revised: 09/24/2018 Document Reviewed: 09/24/2018  Elsevier Patient Education © 2020 [employer]    1.   The pain medication is extremely constipating.    Take a stool softener and drink lots of water.  It also can be addicting.  Please try to transition to an over the counter pain remedy as soon as possible. 2.   Alternating ice and heat for 30 minutes can greatly reduce your pain. 3.   It's ok to shower on the day after your surgery.   Do not scrub the incisions. 4.   After laparoscopy, it's common to have shoulder pain or pain when you take a deep breath.   If the pain radiates down your arm, call or present to the ER. 5.   Please call for redness or drainage from the wound sites that persists. 6.   Feel free to call with questions at [phone].   If you have paperwork that needs filling out, please contact us at this number. 7.   Follow up with me in 1-2 weeks for your postoperative exam. 8.   Activity restrictions vary according to the surgery.   If it hurts, don't do it.   You may begin light exercise at 7-10 days. Billy Berrios MD [facility] [phone] 1.   The pain medication is extremely constipating.    Take a stool softener and drink lots of water.  It also  can be addicting.  Please try to transition to an over the counter pain remedy as soon as possible. 2.   Alternating ice and heat for 30 minutes can greatly reduce your pain. 3.   It's ok to shower on the day after your surgery.   Do not scrub the incisions. 4.   After laparoscopy, it's common to have shoulder pain or pain when you take a deep breath.   If the pain radiates down your arm, call or present to the ER. 5.   Please call for redness or drainage from the wound sites that persists. 6.   Feel free to call with questions at [phone].   If you have paperwork that needs filling out, please contact us at this number. 7.   Follow up with me in 1-2 weeks for your postoperative exam. 8.   Activity restrictions vary according to the surgery.   If it hurts, don't do it.   You may begin light exercise at 7-10 days. Billy Berrios MD [facility] [phone]                Discharge Instructions    Discharged to home by car with relative. Discharged via wheelchair, hospital escort: Yes.  Special equipment needed: Not Applicable    Be sure to schedule a follow-up appointment with your primary care doctor or any specialists as instructed.     Discharge Plan:   Diet Plan: (P) Discussed  Activity Level: (P) Discussed  Confirmed Follow up Appointment: (P) Patient to Call and Schedule Appointment  Confirmed Symptoms Management: (P) Discussed  Medication Reconciliation Updated: (P) Yes  Influenza Vaccine Indication: Patient Refuses    I understand that a diet low in cholesterol, fat, and sodium is recommended for good health. Unless I have been given specific instructions below for another diet, I accept this instruction as my diet prescription.   Other diet: Regular    Special Instructions: None    · Is patient discharged on Warfarin / Coumadin?   No     Depression / Suicide Risk    As you are discharged from this [facility] facility, it is important to learn how to keep safe from harming  yourself.    Recognize the warning signs:  · Abrupt changes in personality, positive or negative- including increase in energy   · Giving away possessions  · Change in eating patterns- significant weight changes-  positive or negative  · Change in sleeping patterns- unable to sleep or sleeping all the time   · Unwillingness or inability to communicate  · Depression  · Unusual sadness, discouragement and loneliness  · Talk of wanting to die  · Neglect of personal appearance   · Rebelliousness- reckless behavior  · Withdrawal from people/activities they love  · Confusion- inability to concentrate     If you or a loved one observes any of these behaviors or has concerns about self-harm, here's what you can do:  · Talk about it- your feelings and reasons for harming yourself  · Remove any means that you might use to hurt yourself (examples: pills, rope, extension cords, firearm)  · Get professional help from the community (Mental Health, Substance Abuse, psychological counseling)  · Do not be alone:Call your Safe Contact- someone whom you trust who will be there for you.  · Call your local CRISIS HOTLINE 561-3347 or 789-817-7872  · Call your local Children's Mobile Crisis Response Team [place] (357) 272-5775 or www.EasyPaint  · Call the toll free National Suicide Prevention Hotlines   · National Suicide Prevention Lifeline 581-586-WPWG (9262)  · National Hope Line Network 800-SUICIDE (984-1421)    Hernia Repair  Care After  These instructions give you information on caring for yourself after your procedure. Your doctor may also give you more specific instructions. Call your doctor if you have any problems or questions after your procedure.  HOME CARE   · You may have changes in your poops (bowel movements).  · You may have loose or watery poop (diarrhea).  · You may be not able to poop.  · Your bowels will slowly get back to normal.  · Do not eat any food that makes you sick to your stomach (nauseous). Eat  small meals 4 to 6 times a day instead of 3 large ones.  · Do not drink pop. It will give you gas.  · Do not drink alcohol.  · Do not lift anything heavier than 10 pounds. This is about the weight of a gallon of milk.  · Do not do anything that makes you very tired for at least 6 weeks.  · Do not get your wound wet for 2 days.  · You may take a sponge bath during this time.  · After 2 days you may take a shower. Gently pat your surgical cut (incision) dry with a towel. Do not rub it.  · For men: You may have been given an athletic supporter (scrotal support) before you left the hospital. It holds your scrotum and testicles closer to your body so there is no strain on your wound. Wear the supporter until your doctor tells you that you do not need it anymore.  GET HELP RIGHT AWAY IF:  · You have watery poop, or cannot poop for more than 3 days.  · You feel sick to your stomach or throw up (vomit) more than 2 or 3 times.  · You have temperature by mouth above 102° F (38.9° C).  · You see redness or puffiness (swelling) around your wound.  · You see yellowish white fluid (pus) coming from your wound.  · You see a bulge or bump in your lower belly (abdomen) or near your groin.  · You develop a rash, trouble breathing, or any other symptoms from medicines taken.  MAKE SURE YOU:  · Understand these instructions.  · Will watch your condition.  · Will get help right away if your are not doing well or get worse.  Document Released: 11/30/2009 Document Revised: 03/11/2013 Document Reviewed: 11/30/2009  [employer]® Patient Information ©2014 [employer].

## 2022-03-11 NOTE — CARE PLAN
Problem: Pain - Standard  Goal: Alleviation of pain or a reduction in pain to the patient’s comfort goal  Outcome: Met     Problem: Knowledge Deficit - Standard  Goal: Patient and family/care givers will demonstrate understanding of plan of care, disease process/condition, diagnostic tests and medications  Outcome: Met     Problem: Infection - Standard  Goal: Patient will remain free from infection  Outcome: Met   The patient is Stable - Low risk of patient condition declining or worsening    Shift Goals  Clinical Goals: pt able to ambulate by hallways by  1300  Patient Goals: pt comfortable and mobilize    Progress made toward(s) clinical / shift goals:  Yes By 1200 Billy was ambulating 2 this morning with steady gait. Pt DC by 1400    Patient is not progressing towards the following goals:

## 2022-03-11 NOTE — PROGRESS NOTES
Report received from night shift. Assumed care. Pt Aox4. In bed. Assessment completed. VS stable. Denies pain. Good CMS and pulses bilateral LE. Abdominal dressing in place, dry, clear, intact. MINDY drain minimal drainage. pervena draining well. Pt hyperactive bowel sounds. Pt encouraged to mobilize. Reinforced pt importance of ambulation and utilization of IS. Pt updated with care plan. Whiteboard updated, all questions answered. Pt has call light in reach. Bed is low and locked. Pt has no other needs at this time.

## 2022-07-14 ENCOUNTER — HOSPITAL ENCOUNTER (OUTPATIENT)
Dept: RADIOLOGY | Facility: MEDICAL CENTER | Age: 57
End: 2022-07-14
Attending: STUDENT IN AN ORGANIZED HEALTH CARE EDUCATION/TRAINING PROGRAM
Payer: COMMERCIAL

## 2022-07-14 DIAGNOSIS — M25.561 RIGHT KNEE PAIN, UNSPECIFIED CHRONICITY: ICD-10-CM

## 2022-07-14 PROCEDURE — 73560 X-RAY EXAM OF KNEE 1 OR 2: CPT | Mod: RT

## 2022-07-27 NOTE — PROGRESS NOTES
Monitor Summary:    [id number]  Rare PAC/Bigeminy/Trigeminy/Couplets, Occasional PVC's  .16/.08/.38     no

## 2023-09-05 ENCOUNTER — APPOINTMENT (RX ONLY)
Dept: URBAN - METROPOLITAN AREA CLINIC 15 | Facility: CLINIC | Age: 58
Setting detail: DERMATOLOGY
End: 2023-09-05

## 2023-09-05 DIAGNOSIS — D18.0 HEMANGIOMA: ICD-10-CM

## 2023-09-05 DIAGNOSIS — L81.4 OTHER MELANIN HYPERPIGMENTATION: ICD-10-CM

## 2023-09-05 DIAGNOSIS — D22 MELANOCYTIC NEVI: ICD-10-CM

## 2023-09-05 DIAGNOSIS — D485 NEOPLASM OF UNCERTAIN BEHAVIOR OF SKIN: ICD-10-CM | Status: INADEQUATELY CONTROLLED

## 2023-09-05 DIAGNOSIS — L90.5 SCAR CONDITIONS AND FIBROSIS OF SKIN: ICD-10-CM

## 2023-09-05 DIAGNOSIS — Z71.89 OTHER SPECIFIED COUNSELING: ICD-10-CM

## 2023-09-05 DIAGNOSIS — L82.1 OTHER SEBORRHEIC KERATOSIS: ICD-10-CM

## 2023-09-05 PROBLEM — D22.5 MELANOCYTIC NEVI OF TRUNK: Status: ACTIVE | Noted: 2023-09-05

## 2023-09-05 PROBLEM — D22.72 MELANOCYTIC NEVI OF LEFT LOWER LIMB, INCLUDING HIP: Status: ACTIVE | Noted: 2023-09-05

## 2023-09-05 PROBLEM — D22.61 MELANOCYTIC NEVI OF RIGHT UPPER LIMB, INCLUDING SHOULDER: Status: ACTIVE | Noted: 2023-09-05

## 2023-09-05 PROBLEM — D22.71 MELANOCYTIC NEVI OF RIGHT LOWER LIMB, INCLUDING HIP: Status: ACTIVE | Noted: 2023-09-05

## 2023-09-05 PROBLEM — D48.5 NEOPLASM OF UNCERTAIN BEHAVIOR OF SKIN: Status: ACTIVE | Noted: 2023-09-05

## 2023-09-05 PROBLEM — D22.62 MELANOCYTIC NEVI OF LEFT UPPER LIMB, INCLUDING SHOULDER: Status: ACTIVE | Noted: 2023-09-05

## 2023-09-05 PROBLEM — D18.01 HEMANGIOMA OF SKIN AND SUBCUTANEOUS TISSUE: Status: ACTIVE | Noted: 2023-09-05

## 2023-09-05 PROCEDURE — ? BIOPSY BY PUNCH METHOD

## 2023-09-05 PROCEDURE — ? COUNSELING

## 2023-09-05 PROCEDURE — 99203 OFFICE O/P NEW LOW 30 MIN: CPT | Mod: 25

## 2023-09-05 PROCEDURE — 11104 PUNCH BX SKIN SINGLE LESION: CPT

## 2023-09-05 ASSESSMENT — LOCATION DETAILED DESCRIPTION DERM
LOCATION DETAILED: LEFT PROXIMAL DORSAL FOREARM
LOCATION DETAILED: RIGHT SUPERIOR LATERAL MIDBACK
LOCATION DETAILED: RIGHT VENTRAL DISTAL FOREARM
LOCATION DETAILED: LEFT ANTERIOR PROXIMAL THIGH
LOCATION DETAILED: LEFT CENTRAL MALAR CHEEK
LOCATION DETAILED: RIGHT DISTAL POSTERIOR THIGH
LOCATION DETAILED: LEFT DISTAL POSTERIOR THIGH
LOCATION DETAILED: PERIUMBILICAL SKIN
LOCATION DETAILED: LEFT VENTRAL DISTAL FOREARM
LOCATION DETAILED: LEFT INFERIOR UPPER BACK
LOCATION DETAILED: RIGHT VENTRAL PROXIMAL FOREARM
LOCATION DETAILED: RIGHT PROXIMAL CALF
LOCATION DETAILED: LEFT ANTERIOR DISTAL UPPER ARM
LOCATION DETAILED: RIGHT PROXIMAL DORSAL FOREARM
LOCATION DETAILED: RIGHT MEDIAL INFERIOR CHEST
LOCATION DETAILED: LEFT PROXIMAL CALF
LOCATION DETAILED: RIGHT MID-UPPER BACK
LOCATION DETAILED: RIGHT DISTAL PRETIBIAL REGION
LOCATION DETAILED: RIGHT ANTERIOR PROXIMAL THIGH
LOCATION DETAILED: RIGHT ANTERIOR DISTAL UPPER ARM

## 2023-09-05 ASSESSMENT — LOCATION SIMPLE DESCRIPTION DERM
LOCATION SIMPLE: CHEST
LOCATION SIMPLE: ABDOMEN
LOCATION SIMPLE: RIGHT FOREARM
LOCATION SIMPLE: RIGHT PRETIBIAL REGION
LOCATION SIMPLE: LEFT CHEEK
LOCATION SIMPLE: LEFT CALF
LOCATION SIMPLE: RIGHT LOWER BACK
LOCATION SIMPLE: RIGHT UPPER ARM
LOCATION SIMPLE: RIGHT THIGH
LOCATION SIMPLE: LEFT THIGH
LOCATION SIMPLE: LEFT UPPER ARM
LOCATION SIMPLE: LEFT UPPER BACK
LOCATION SIMPLE: RIGHT UPPER BACK
LOCATION SIMPLE: LEFT FOREARM
LOCATION SIMPLE: RIGHT CALF
LOCATION SIMPLE: RIGHT POSTERIOR THIGH
LOCATION SIMPLE: LEFT POSTERIOR THIGH

## 2023-09-05 ASSESSMENT — LOCATION ZONE DERM
LOCATION ZONE: LEG
LOCATION ZONE: TRUNK
LOCATION ZONE: FACE
LOCATION ZONE: ARM

## 2023-09-14 ENCOUNTER — APPOINTMENT (RX ONLY)
Dept: URBAN - METROPOLITAN AREA CLINIC 15 | Facility: CLINIC | Age: 58
Setting detail: DERMATOLOGY
End: 2023-09-14

## 2023-09-14 DIAGNOSIS — Z48.02 ENCOUNTER FOR REMOVAL OF SUTURES: ICD-10-CM

## 2023-09-14 DIAGNOSIS — Z48.817 ENCOUNTER FOR SURGICAL AFTERCARE FOLLOWING SURGERY ON THE SKIN AND SUBCUTANEOUS TISSUE: ICD-10-CM

## 2023-09-14 PROCEDURE — ? PRESCRIPTION

## 2023-09-14 PROCEDURE — ? SUTURE REMOVAL (GLOBAL PERIOD)

## 2023-09-14 RX ORDER — DOXYCYCLINE 100 MG/1
1 TABLET, FILM COATED ORAL BID
Qty: 20 | Refills: 0 | Status: ERX | COMMUNITY
Start: 2023-09-14

## 2023-09-14 RX ADMIN — DOXYCYCLINE 1: 100 TABLET, FILM COATED ORAL at 00:00

## 2023-09-14 ASSESSMENT — LOCATION DETAILED DESCRIPTION DERM: LOCATION DETAILED: RIGHT DISTAL PRETIBIAL REGION

## 2023-09-14 ASSESSMENT — LOCATION SIMPLE DESCRIPTION DERM: LOCATION SIMPLE: RIGHT PRETIBIAL REGION

## 2023-09-14 ASSESSMENT — LOCATION ZONE DERM: LOCATION ZONE: LEG

## 2023-09-14 NOTE — PROCEDURE: SUTURE REMOVAL (GLOBAL PERIOD)
Detail Level: Detailed
Add 24092 Cpt? (Important Note: In 2017 The Use Of 28666 Is Being Tracked By Cms To Determine Future Global Period Reimbursement For Global Periods): no

## 2024-03-27 ENCOUNTER — HOSPITAL ENCOUNTER (OUTPATIENT)
Dept: LAB | Facility: MEDICAL CENTER | Age: 59
End: 2024-03-27
Attending: STUDENT IN AN ORGANIZED HEALTH CARE EDUCATION/TRAINING PROGRAM
Payer: COMMERCIAL

## 2024-03-27 ENCOUNTER — HOSPITAL ENCOUNTER (OUTPATIENT)
Dept: RADIOLOGY | Facility: MEDICAL CENTER | Age: 59
End: 2024-03-27
Attending: STUDENT IN AN ORGANIZED HEALTH CARE EDUCATION/TRAINING PROGRAM
Payer: COMMERCIAL

## 2024-03-27 DIAGNOSIS — R06.02 SHORTNESS OF BREATH: ICD-10-CM

## 2024-03-27 LAB
ALBUMIN SERPL BCP-MCNC: 4.3 G/DL (ref 3.2–4.9)
ALBUMIN/GLOB SERPL: 1.4 G/DL
ALP SERPL-CCNC: 72 U/L (ref 30–99)
ALT SERPL-CCNC: 30 U/L (ref 2–50)
ANION GAP SERPL CALC-SCNC: 12 MMOL/L (ref 7–16)
AST SERPL-CCNC: 23 U/L (ref 12–45)
BASOPHILS # BLD AUTO: 0.5 % (ref 0–1.8)
BASOPHILS # BLD: 0.04 K/UL (ref 0–0.12)
BILIRUB SERPL-MCNC: 0.8 MG/DL (ref 0.1–1.5)
BUN SERPL-MCNC: 22 MG/DL (ref 8–22)
CALCIUM ALBUM COR SERPL-MCNC: 9.3 MG/DL (ref 8.5–10.5)
CALCIUM SERPL-MCNC: 9.5 MG/DL (ref 8.5–10.5)
CHLORIDE SERPL-SCNC: 105 MMOL/L (ref 96–112)
CHOLEST SERPL-MCNC: 144 MG/DL (ref 100–199)
CO2 SERPL-SCNC: 24 MMOL/L (ref 20–33)
CREAT SERPL-MCNC: 1.06 MG/DL (ref 0.5–1.4)
EOSINOPHIL # BLD AUTO: 0.21 K/UL (ref 0–0.51)
EOSINOPHIL NFR BLD: 2.7 % (ref 0–6.9)
ERYTHROCYTE [DISTWIDTH] IN BLOOD BY AUTOMATED COUNT: 41.7 FL (ref 35.9–50)
EST. AVERAGE GLUCOSE BLD GHB EST-MCNC: 146 MG/DL
FASTING STATUS PATIENT QL REPORTED: NORMAL
GFR SERPLBLD CREATININE-BSD FMLA CKD-EPI: 82 ML/MIN/1.73 M 2
GLOBULIN SER CALC-MCNC: 3.1 G/DL (ref 1.9–3.5)
GLUCOSE SERPL-MCNC: 130 MG/DL (ref 65–99)
HBA1C MFR BLD: 6.7 % (ref 4–5.6)
HCT VFR BLD AUTO: 55.7 % (ref 42–52)
HDLC SERPL-MCNC: 24 MG/DL
HGB BLD-MCNC: 18.8 G/DL (ref 14–18)
IMM GRANULOCYTES # BLD AUTO: 0.08 K/UL (ref 0–0.11)
IMM GRANULOCYTES NFR BLD AUTO: 1 % (ref 0–0.9)
LDLC SERPL CALC-MCNC: 69 MG/DL
LYMPHOCYTES # BLD AUTO: 2.03 K/UL (ref 1–4.8)
LYMPHOCYTES NFR BLD: 26.2 % (ref 22–41)
MCH RBC QN AUTO: 29.3 PG (ref 27–33)
MCHC RBC AUTO-ENTMCNC: 33.8 G/DL (ref 32.3–36.5)
MCV RBC AUTO: 86.9 FL (ref 81.4–97.8)
MONOCYTES # BLD AUTO: 0.8 K/UL (ref 0–0.85)
MONOCYTES NFR BLD AUTO: 10.3 % (ref 0–13.4)
NEUTROPHILS # BLD AUTO: 4.59 K/UL (ref 1.82–7.42)
NEUTROPHILS NFR BLD: 59.3 % (ref 44–72)
NRBC # BLD AUTO: 0 K/UL
NRBC BLD-RTO: 0 /100 WBC (ref 0–0.2)
NT-PROBNP SERPL IA-MCNC: <36 PG/ML (ref 0–125)
PLATELET # BLD AUTO: 185 K/UL (ref 164–446)
PMV BLD AUTO: 12.4 FL (ref 9–12.9)
POTASSIUM SERPL-SCNC: 4.1 MMOL/L (ref 3.6–5.5)
PROT SERPL-MCNC: 7.4 G/DL (ref 6–8.2)
PSA SERPL-MCNC: 0.12 NG/ML (ref 0–4)
RBC # BLD AUTO: 6.41 M/UL (ref 4.7–6.1)
SODIUM SERPL-SCNC: 141 MMOL/L (ref 135–145)
TRIGL SERPL-MCNC: 255 MG/DL (ref 0–149)
TSH SERPL DL<=0.005 MIU/L-ACNC: 1.55 UIU/ML (ref 0.38–5.33)
WBC # BLD AUTO: 7.8 K/UL (ref 4.8–10.8)

## 2024-03-27 PROCEDURE — 85025 COMPLETE CBC W/AUTO DIFF WBC: CPT

## 2024-03-27 PROCEDURE — 80061 LIPID PANEL: CPT

## 2024-03-27 PROCEDURE — 36415 COLL VENOUS BLD VENIPUNCTURE: CPT

## 2024-03-27 PROCEDURE — 84153 ASSAY OF PSA TOTAL: CPT

## 2024-03-27 PROCEDURE — 80053 COMPREHEN METABOLIC PANEL: CPT

## 2024-03-27 PROCEDURE — 83880 ASSAY OF NATRIURETIC PEPTIDE: CPT

## 2024-03-27 PROCEDURE — 83036 HEMOGLOBIN GLYCOSYLATED A1C: CPT

## 2024-03-27 PROCEDURE — 71046 X-RAY EXAM CHEST 2 VIEWS: CPT

## 2024-03-27 PROCEDURE — 84443 ASSAY THYROID STIM HORMONE: CPT

## 2024-03-29 ENCOUNTER — HOSPITAL ENCOUNTER (OUTPATIENT)
Dept: CARDIOLOGY | Facility: MEDICAL CENTER | Age: 59
End: 2024-03-29
Attending: STUDENT IN AN ORGANIZED HEALTH CARE EDUCATION/TRAINING PROGRAM
Payer: COMMERCIAL

## 2024-03-29 DIAGNOSIS — R06.02 SHORTNESS OF BREATH: ICD-10-CM

## 2024-03-29 LAB
LV EJECT FRACT MOD 2C 99903: 56.34
LV EJECT FRACT MOD 4C 99902: 57.1
LV EJECT FRACT MOD BP 99901: 60.31

## 2024-03-29 PROCEDURE — 93306 TTE W/DOPPLER COMPLETE: CPT

## 2024-03-29 PROCEDURE — 700117 HCHG RX CONTRAST REV CODE 255: Performed by: FAMILY MEDICINE

## 2024-03-29 RX ADMIN — HUMAN ALBUMIN MICROSPHERES AND PERFLUTREN 3 ML: 10; .22 INJECTION, SOLUTION INTRAVENOUS at 17:45

## 2024-05-04 NOTE — PROCEDURE: BIOPSY BY PUNCH METHOD
37
Detail Level: Detailed
Was A Bandage Applied: Yes
Punch Size In Mm: 4
Size Of Lesion In Cm (Optional): 0
Depth Of Punch Biopsy: dermis
Biopsy Type: H and E
Anesthesia Type: 1% lidocaine with epinephrine
Anesthesia Volume In Cc: 0.5
Hemostasis: None
Epidermal Sutures: 4-0 Ethilon
Wound Care: Petrolatum
Dressing: bandage
Suture Removal: 14 days
Patient Will Remove Sutures At Home?: No
Lab: [id number]
Lab Facility: 
Consent: Written consent was obtained and risks were reviewed including but not limited to scarring, infection, bleeding, scabbing, incomplete removal, nerve damage and allergy to anesthesia.
Post-Care Instructions: I reviewed with the patient in detail post-care instructions. Patient is to keep the biopsy site dry overnight, and then apply bacitracin twice daily until healed. Patient may apply hydrogen peroxide soaks to remove any crusting.
Home Suture Removal Text: Patient was provided a home suture removal kit and will remove their sutures at home.  If they have any questions or difficulties they will call the office.
Notification Instructions: Patient will be notified of biopsy results. However, patient instructed to call the office if not contacted within 2 weeks.
Billing Type: Third-Party Bill
Information: Selecting Yes will display possible errors in your note based on the variables you have selected. This validation is only offered as a suggestion for you. PLEASE NOTE THAT THE VALIDATION TEXT WILL BE REMOVED WHEN YOU FINALIZE YOUR NOTE. IF YOU WANT TO FAX A PRELIMINARY NOTE YOU WILL NEED TO TOGGLE THIS TO 'NO' IF YOU DO NOT WANT IT IN YOUR FAXED NOTE.

## 2024-07-05 ENCOUNTER — HOSPITAL ENCOUNTER (OUTPATIENT)
Dept: RADIOLOGY | Facility: MEDICAL CENTER | Age: 59
End: 2024-07-05
Attending: INTERNAL MEDICINE
Payer: COMMERCIAL

## 2024-07-05 DIAGNOSIS — Z82.49 FAMILY HISTORY OF ISCHEMIC HEART DISEASE: ICD-10-CM

## 2024-07-05 PROCEDURE — 700117 HCHG RX CONTRAST REV CODE 255: Performed by: INTERNAL MEDICINE

## 2024-07-05 PROCEDURE — 71275 CT ANGIOGRAPHY CHEST: CPT

## 2024-07-05 RX ADMIN — IOHEXOL 90 ML: 350 INJECTION, SOLUTION INTRAVENOUS at 08:40

## 2024-11-07 ENCOUNTER — HOSPITAL ENCOUNTER (OUTPATIENT)
Dept: RADIOLOGY | Facility: MEDICAL CENTER | Age: 59
End: 2024-11-07
Attending: STUDENT IN AN ORGANIZED HEALTH CARE EDUCATION/TRAINING PROGRAM
Payer: COMMERCIAL

## 2024-11-07 ENCOUNTER — HOSPITAL ENCOUNTER (OUTPATIENT)
Dept: LAB | Facility: MEDICAL CENTER | Age: 59
End: 2024-11-07
Attending: INTERNAL MEDICINE
Payer: COMMERCIAL

## 2024-11-07 DIAGNOSIS — M25.519 NECK AND SHOULDER PAIN: ICD-10-CM

## 2024-11-07 DIAGNOSIS — M54.2 NECK AND SHOULDER PAIN: ICD-10-CM

## 2024-11-07 LAB
ALBUMIN SERPL BCP-MCNC: 4.5 G/DL (ref 3.2–4.9)
ALBUMIN/GLOB SERPL: 1.5 G/DL
ALP SERPL-CCNC: 45 U/L (ref 30–99)
ALT SERPL-CCNC: 42 U/L (ref 2–50)
ANION GAP SERPL CALC-SCNC: 10 MMOL/L (ref 7–16)
AST SERPL-CCNC: 30 U/L (ref 12–45)
BILIRUB SERPL-MCNC: 1.1 MG/DL (ref 0.1–1.5)
BUN SERPL-MCNC: 27 MG/DL (ref 8–22)
CALCIUM ALBUM COR SERPL-MCNC: 9.6 MG/DL (ref 8.5–10.5)
CALCIUM SERPL-MCNC: 10 MG/DL (ref 8.5–10.5)
CHLORIDE SERPL-SCNC: 106 MMOL/L (ref 96–112)
CHOLEST SERPL-MCNC: 118 MG/DL (ref 100–199)
CO2 SERPL-SCNC: 25 MMOL/L (ref 20–33)
CREAT SERPL-MCNC: 1.57 MG/DL (ref 0.5–1.4)
GFR SERPLBLD CREATININE-BSD FMLA CKD-EPI: 51 ML/MIN/1.73 M 2
GLOBULIN SER CALC-MCNC: 3.1 G/DL (ref 1.9–3.5)
GLUCOSE SERPL-MCNC: 98 MG/DL (ref 65–99)
HDLC SERPL-MCNC: 27 MG/DL
LDLC SERPL CALC-MCNC: 76 MG/DL
POTASSIUM SERPL-SCNC: 4.4 MMOL/L (ref 3.6–5.5)
PROT SERPL-MCNC: 7.6 G/DL (ref 6–8.2)
SODIUM SERPL-SCNC: 141 MMOL/L (ref 135–145)
TRIGL SERPL-MCNC: 75 MG/DL (ref 0–149)

## 2024-11-07 PROCEDURE — 36415 COLL VENOUS BLD VENIPUNCTURE: CPT

## 2024-11-07 PROCEDURE — 80053 COMPREHEN METABOLIC PANEL: CPT

## 2024-11-07 PROCEDURE — 80061 LIPID PANEL: CPT

## 2024-11-07 PROCEDURE — 73030 X-RAY EXAM OF SHOULDER: CPT | Mod: RT

## 2024-12-07 ENCOUNTER — HOSPITAL ENCOUNTER (OUTPATIENT)
Dept: LAB | Facility: MEDICAL CENTER | Age: 59
End: 2024-12-07
Attending: INTERNAL MEDICINE
Payer: COMMERCIAL

## 2024-12-07 LAB
ANION GAP SERPL CALC-SCNC: 11 MMOL/L (ref 7–16)
BUN SERPL-MCNC: 26 MG/DL (ref 8–22)
CALCIUM SERPL-MCNC: 9.6 MG/DL (ref 8.5–10.5)
CHLORIDE SERPL-SCNC: 106 MMOL/L (ref 96–112)
CO2 SERPL-SCNC: 23 MMOL/L (ref 20–33)
CREAT SERPL-MCNC: 1.4 MG/DL (ref 0.5–1.4)
GFR SERPLBLD CREATININE-BSD FMLA CKD-EPI: 59 ML/MIN/1.73 M 2
GLUCOSE SERPL-MCNC: 132 MG/DL (ref 65–99)
POTASSIUM SERPL-SCNC: 4.1 MMOL/L (ref 3.6–5.5)
SODIUM SERPL-SCNC: 140 MMOL/L (ref 135–145)

## 2024-12-07 PROCEDURE — 36415 COLL VENOUS BLD VENIPUNCTURE: CPT

## 2024-12-07 PROCEDURE — 80048 BASIC METABOLIC PNL TOTAL CA: CPT

## 2025-03-14 ENCOUNTER — TELEPHONE (OUTPATIENT)
Dept: HEALTH INFORMATION MANAGEMENT | Facility: OTHER | Age: 60
End: 2025-03-14

## (undated) DEVICE — ELECTRODE 850 FOAM ADHESIVE - HYDROGEL RADIOTRNSPRNT (50/PK)

## (undated) DEVICE — TRAY CATHETER FOLEY URINE METER W/STATLOCK 350ML (10EA/CA)

## (undated) DEVICE — NEEDLE NON SAFETY HYPO 22 GA X 1 1/2 IN (100/BX)

## (undated) DEVICE — DRESSING KIT V.A.C. SENSA T.R.A.C. SMALL (10EA/CA)

## (undated) DEVICE — TOWEL STOP TIMEOUT SAFETY FLAG (40EA/CA)

## (undated) DEVICE — RESERVOIR SUCTION 100 CC - SILICONE (20EA/CA)

## (undated) DEVICE — PACK MINOR BASIN - (2EA/CA)

## (undated) DEVICE — SET LEADWIRE 5 LEAD BEDSIDE DISPOSABLE ECG (1SET OF 5/EA)

## (undated) DEVICE — GLOVE BIOGEL SZ 8 SURGICAL PF LTX - (50PR/BX 4BX/CA)

## (undated) DEVICE — HUMID-VENT HEAT AND MOISTURE EXCHANGE- (50/BX)

## (undated) DEVICE — BLADE SURGICAL #15 - (50/BX 3BX/CA)

## (undated) DEVICE — DRAPE COLUMN  BOX OF 20

## (undated) DEVICE — SUTURE2-0 27IN VCRL ANTI VIOL (36PK/BX)

## (undated) DEVICE — SUTURE GENERAL

## (undated) DEVICE — SUTURE 0 PDS-2 CTX 36 INCH - (24/BX)

## (undated) DEVICE — SUCTION INSTRUMENT YANKAUER BULBOUS TIP W/O VENT (50EA/CA)

## (undated) DEVICE — SUTURE 1 PDS II PLUS TP-1 - (12PK/BX)

## (undated) DEVICE — PACK MAJOR BASIN - (2EA/CA)

## (undated) DEVICE — CANISTER SUCTION 3000ML MECHANICAL FILTER AUTO SHUTOFF MEDI-VAC NONSTERILE LF DISP  (40EA/CA)

## (undated) DEVICE — GLOVE SZ 6.5 BIOGEL PI MICRO - PF LF (50PR/BX)

## (undated) DEVICE — SET TUBING PNEUMOCLEAR HIGH FLOW SMOKE EVACUATION (10EA/BX)

## (undated) DEVICE — SODIUM CHL IRRIGATION 0.9% 1000ML (12EA/CA)

## (undated) DEVICE — SUTURE 1 VICRYL PLUS CTX - 8 X 18 INCH (12/BX)

## (undated) DEVICE — PROTECTOR ULNA NERVE - (36PR/CA)

## (undated) DEVICE — LACTATED RINGERS INJ 1000 ML - (14EA/CA 60CA/PF)

## (undated) DEVICE — NEPTUNE 4 PORT MANIFOLD - (20/PK)

## (undated) DEVICE — ROBOTIC SURGERY SERVICES

## (undated) DEVICE — SENSOR SPO2 NEO LNCS ADHESIVE (20/BX) SEE USER NOTES

## (undated) DEVICE — SLEEVE VASO CALF MED - (10PR/CA)

## (undated) DEVICE — SUTURE 0 COATED VICRYL 6-18IN - (12PK/BX)

## (undated) DEVICE — GLOVE BIOGEL PI INDICATOR SZ 7.0 SURGICAL PF LF - (50/BX 4BX/CA)

## (undated) DEVICE — KIT ANESTHESIA W/CIRCUIT & 3/LT BAG W/FILTER (20EA/CA)

## (undated) DEVICE — ELECTRODE DUAL RETURN W/ CORD - (50/PK)

## (undated) DEVICE — SHEARS MONOPOLAR CURVED  DA VINCI 10X'S REUSABLE

## (undated) DEVICE — GLOVE BIOGEL PI INDICATOR SZ 8.0 SURGICAL PF LF -(50/BX 4BX/CA)

## (undated) DEVICE — SUTURE 0 VICRYL PLUS CT-1 - 8 X 18 INCH (12/BX)

## (undated) DEVICE — CANISTER SUCTION RIGID RED 1500CC (40EA/CA)

## (undated) DEVICE — SUTURE 3-0 VICRYL PLUS CT-1 - 8 X 18 INCH (12/BX)

## (undated) DEVICE — NEEDLE INSFL 120MM 14GA VRRS - (20/BX)

## (undated) DEVICE — TUBING CLEARLINK DUO-VENT - C-FLO (48EA/CA)

## (undated) DEVICE — SET EXTENSION WITH 2 PORTS (48EA/CA) ***PART #2C8610 IS A SUBSTITUTE*****

## (undated) DEVICE — SUTURE 3-0 VICRYL PLUS SH - 8X 18 INCH (12/BX)

## (undated) DEVICE — SLEEVE, VASO, THIGH, MED

## (undated) DEVICE — SUTURE 0 PDS CT-1 CR 8 X 18 (12PK/BX)"

## (undated) DEVICE — DRAPE IOBAN II INCISE 23X17 - (10EA/BX 4BX/CA)

## (undated) DEVICE — SUTURE 2-0 30CM STRATAFIX SPIRAL PDO ***WAS PART #SXPD1B401 *****

## (undated) DEVICE — OBTURATOR BLADELESS STANDARD 8MM (6EA/BX)

## (undated) DEVICE — HEAD HOLDER JUNIOR/ADULT

## (undated) DEVICE — TRAY SRGPRP PVP IOD WT PRP - (20/CA)

## (undated) DEVICE — SYRINGE 10 ML CONTROL LL (25EA/BX 4BX/CA)

## (undated) DEVICE — TUBE CONNECTING SUCTION - CLEAR PLASTIC STERILE 72 IN (50EA/CA)

## (undated) DEVICE — BIPOLAR FORCE DA VINCI 12X'S REISABLE

## (undated) DEVICE — CHLORAPREP 26 ML APPLICATOR - ORANGE TINT(25/CA)

## (undated) DEVICE — NEEDLE DRIVER MEGA SUTURECUT DA VINCI 15X'S REUSABLE

## (undated) DEVICE — DRAPE LAPAROTOMY T SHEET - (12EA/CA)

## (undated) DEVICE — SUTURE 2-0 STRATAFIX SPIRAL PDS SH (12EA/BX)

## (undated) DEVICE — MASK ANESTHESIA ADULT  - (100/CA)

## (undated) DEVICE — GLOVE, LITE (PAIR)

## (undated) DEVICE — BINDER ABDOMINAL 30X45X12IN - FITS 30-45IN WAIST 12IN HIGH

## (undated) DEVICE — COVER TIP ENDOWRIST HOT SHEAR - (10EA/BX) DA VINCI

## (undated) DEVICE — Device

## (undated) DEVICE — SYSTEM CLEARIFY VISUALIZATION (10EA/PK)

## (undated) DEVICE — TUBE CONNECT SUCTION CLEAR 120 X 1/4" (50EA/CA)"

## (undated) DEVICE — TOWELS CLOTH SURGICAL - (4/PK 20PK/CA)

## (undated) DEVICE — GOWN WARMING STANDARD FLEX - (30/CA)

## (undated) DEVICE — SUTURE 2-0 ETHILON FS - (36/BX) 18 INCH

## (undated) DEVICE — SEAL 5MM-8MM UNIVERSAL  BOX OF 10

## (undated) DEVICE — SPONGE GAUZESTER 4 X 4 4PLY - (128PK/CA)

## (undated) DEVICE — SUTURE 2-0 VICRYL PLUS CT-1 - 8 X 18 INCH(12/BX)

## (undated) DEVICE — STAPLER SKIN DISP - (6/BX 10BX/CA) VISISTAT

## (undated) DEVICE — SUTURE 1 STRATAFIX SYMMETRIC PDS PLUS CT-1 45CM (12EA/BX)

## (undated) DEVICE — BAG SPONGE COUNT 10.25 X 32 - BLUE (250/CA)

## (undated) DEVICE — DERMABOND ADVANCED - (12EA/BX)

## (undated) DEVICE — WATER IRRIGATION STERILE 1000ML (12EA/CA)

## (undated) DEVICE — SUTURE 4-0 MONOCRYL PLUS PS-2 - 27 INCH (36/BX)

## (undated) DEVICE — DRAPE ARM  BOX OF 20

## (undated) DEVICE — GLOVE BIOGEL INDICATOR SZ 6.5 SURGICAL PF LTX - (50PR/BX 4BX/CA)

## (undated) DEVICE — SYRINGE 30 ML LS (56/BX)

## (undated) DEVICE — SUTURE 2-0 20CM STRATAFIX SPIRAL SH NEEDLE (12/BX)

## (undated) DEVICE — SUTURE 3-0 SILK SH C/R 18 IN - (12/BX)

## (undated) DEVICE — SUTURE 2-0 VICRYL PLUS SH - 27 INCH (36/BX)

## (undated) DEVICE — AGENT HEMOSTATIC BELLOW HEMOBLAST (12EA/CA)